# Patient Record
Sex: MALE | Race: WHITE | NOT HISPANIC OR LATINO | Employment: FULL TIME | URBAN - METROPOLITAN AREA
[De-identification: names, ages, dates, MRNs, and addresses within clinical notes are randomized per-mention and may not be internally consistent; named-entity substitution may affect disease eponyms.]

---

## 2019-04-04 LAB
HBA1C MFR BLD HPLC: 5.1 %
HCV AB SER-ACNC: <0.1

## 2019-11-25 DIAGNOSIS — K21.9 GASTROESOPHAGEAL REFLUX DISEASE, ESOPHAGITIS PRESENCE NOT SPECIFIED: Primary | ICD-10-CM

## 2019-11-25 RX ORDER — RABEPRAZOLE SODIUM 20 MG/1
20 TABLET, DELAYED RELEASE ORAL DAILY
Qty: 90 TABLET | Refills: 2 | Status: SHIPPED | OUTPATIENT
Start: 2019-11-25 | End: 2020-09-14 | Stop reason: SDUPTHER

## 2020-09-14 DIAGNOSIS — K21.9 GASTROESOPHAGEAL REFLUX DISEASE, ESOPHAGITIS PRESENCE NOT SPECIFIED: ICD-10-CM

## 2020-09-14 RX ORDER — RABEPRAZOLE SODIUM 20 MG/1
20 TABLET, DELAYED RELEASE ORAL DAILY
Qty: 90 TABLET | Refills: 3 | Status: SHIPPED | OUTPATIENT
Start: 2020-09-14 | End: 2020-11-11 | Stop reason: SDUPTHER

## 2020-09-14 NOTE — TELEPHONE ENCOUNTER
Patient needs a refill of Aciphex to [facility] for 90 day supply  He was last seen 11/19 and is due for egd in December

## 2020-11-11 ENCOUNTER — OFFICE VISIT (OUTPATIENT)
Dept: GASTROENTEROLOGY | Facility: CLINIC | Age: 55
End: 2020-11-11
Payer: COMMERCIAL

## 2020-11-11 VITALS
SYSTOLIC BLOOD PRESSURE: 124 MMHG | DIASTOLIC BLOOD PRESSURE: 70 MMHG | BODY MASS INDEX: 37.92 KG/M2 | HEIGHT: 69 IN | WEIGHT: 256 LBS | HEART RATE: 79 BPM | TEMPERATURE: 97.4 F

## 2020-11-11 DIAGNOSIS — R13.19 ESOPHAGEAL DYSPHAGIA: ICD-10-CM

## 2020-11-11 DIAGNOSIS — K21.9 GASTROESOPHAGEAL REFLUX DISEASE: Primary | ICD-10-CM

## 2020-11-11 DIAGNOSIS — Z86.010 HISTORY OF COLON POLYPS: ICD-10-CM

## 2020-11-11 PROCEDURE — 99214 OFFICE O/P EST MOD 30 MIN: CPT | Performed by: INTERNAL MEDICINE

## 2020-11-11 RX ORDER — RABEPRAZOLE SODIUM 20 MG/1
20 TABLET, DELAYED RELEASE ORAL DAILY
Qty: 90 TABLET | Refills: 3 | Status: SHIPPED | OUTPATIENT
Start: 2020-11-11 | End: 2021-12-08 | Stop reason: SDUPTHER

## 2020-12-08 ENCOUNTER — TELEMEDICINE (OUTPATIENT)
Dept: GASTROENTEROLOGY | Facility: CLINIC | Age: 55
End: 2020-12-08

## 2020-12-08 VITALS — HEIGHT: 69 IN | BODY MASS INDEX: 37.92 KG/M2 | WEIGHT: 256 LBS

## 2020-12-08 DIAGNOSIS — Z86.010 HISTORY OF COLON POLYPS: Primary | ICD-10-CM

## 2020-12-08 RX ORDER — SODIUM PICOSULFATE, MAGNESIUM OXIDE, AND ANHYDROUS CITRIC ACID 10; 3.5; 12 MG/160ML; G/160ML; G/160ML
LIQUID ORAL
Qty: 2 BOTTLE | Refills: 0 | Status: SHIPPED | OUTPATIENT
Start: 2020-12-08 | End: 2021-01-11 | Stop reason: HOSPADM

## 2020-12-15 ENCOUNTER — HOSPITAL ENCOUNTER (OUTPATIENT)
Dept: GASTROENTEROLOGY | Facility: AMBULATORY SURGERY CENTER | Age: 55
Discharge: HOME | End: 2020-12-15

## 2020-12-16 ENCOUNTER — APPOINTMENT (EMERGENCY)
Dept: RADIOLOGY | Facility: HOSPITAL | Age: 55
End: 2020-12-16
Payer: COMMERCIAL

## 2020-12-16 ENCOUNTER — HOSPITAL ENCOUNTER (EMERGENCY)
Facility: HOSPITAL | Age: 55
Discharge: HOME | End: 2020-12-16
Attending: EMERGENCY MEDICINE | Admitting: EMERGENCY MEDICINE
Payer: COMMERCIAL

## 2020-12-16 VITALS
WEIGHT: 250 LBS | DIASTOLIC BLOOD PRESSURE: 72 MMHG | BODY MASS INDEX: 37.03 KG/M2 | HEIGHT: 69 IN | HEART RATE: 72 BPM | SYSTOLIC BLOOD PRESSURE: 123 MMHG | TEMPERATURE: 99.4 F | OXYGEN SATURATION: 94 % | RESPIRATION RATE: 17 BRPM

## 2020-12-16 DIAGNOSIS — U07.1 COVID-19: Primary | ICD-10-CM

## 2020-12-16 LAB
ATRIAL RATE: 74 BPM
P AXIS: 27 DEGREES
PR INTERVAL: 132 MS
QRS AXIS: -54 DEGREES
QRSD INTERVAL: 96 MS
QT INTERVAL: 368 MS
QTC INTERVAL: 408 MS
T WAVE AXIS: 29 DEGREES
VENTRICULAR RATE: 74 BPM

## 2020-12-16 PROCEDURE — 93005 ELECTROCARDIOGRAM TRACING: CPT

## 2020-12-16 PROCEDURE — 93010 ELECTROCARDIOGRAM REPORT: CPT | Performed by: INTERNAL MEDICINE

## 2020-12-16 PROCEDURE — 99285 EMERGENCY DEPT VISIT HI MDM: CPT | Performed by: PHYSICIAN ASSISTANT

## 2020-12-16 PROCEDURE — 99285 EMERGENCY DEPT VISIT HI MDM: CPT

## 2020-12-16 PROCEDURE — 71045 X-RAY EXAM CHEST 1 VIEW: CPT

## 2020-12-16 RX ORDER — MULTIVIT WITH MINERALS/LUTEIN
1000 TABLET ORAL 2 TIMES DAILY
Qty: 20 TABLET | Refills: 0 | Status: SHIPPED | OUTPATIENT
Start: 2020-12-16 | End: 2021-12-14

## 2020-12-16 RX ORDER — MELATONIN
2000 DAILY
Qty: 20 TABLET | Refills: 0 | Status: SHIPPED | OUTPATIENT
Start: 2020-12-16 | End: 2021-12-14

## 2021-01-11 ENCOUNTER — HOSPITAL ENCOUNTER (OUTPATIENT)
Dept: GASTROENTEROLOGY | Facility: AMBULATORY SURGERY CENTER | Age: 56
Discharge: HOME | End: 2021-01-11
Payer: COMMERCIAL

## 2021-01-11 ENCOUNTER — ANESTHESIA EVENT (OUTPATIENT)
Dept: GASTROENTEROLOGY | Facility: AMBULATORY SURGERY CENTER | Age: 56
End: 2021-01-11

## 2021-01-11 ENCOUNTER — ANESTHESIA (OUTPATIENT)
Dept: GASTROENTEROLOGY | Facility: AMBULATORY SURGERY CENTER | Age: 56
End: 2021-01-11

## 2021-01-11 VITALS
SYSTOLIC BLOOD PRESSURE: 146 MMHG | DIASTOLIC BLOOD PRESSURE: 85 MMHG | TEMPERATURE: 97.3 F | HEART RATE: 61 BPM | OXYGEN SATURATION: 98 % | RESPIRATION RATE: 26 BRPM

## 2021-01-11 VITALS — HEART RATE: 59 BPM

## 2021-01-11 DIAGNOSIS — R13.19 ESOPHAGEAL DYSPHAGIA: ICD-10-CM

## 2021-01-11 DIAGNOSIS — Z86.010 HISTORY OF COLON POLYPS: ICD-10-CM

## 2021-01-11 DIAGNOSIS — K21.9 GASTROESOPHAGEAL REFLUX DISEASE: ICD-10-CM

## 2021-01-11 PROCEDURE — 43450 DILATE ESOPHAGUS 1/MULT PASS: CPT | Performed by: INTERNAL MEDICINE

## 2021-01-11 PROCEDURE — 43239 EGD BIOPSY SINGLE/MULTIPLE: CPT | Performed by: INTERNAL MEDICINE

## 2021-01-11 PROCEDURE — 45380 COLONOSCOPY AND BIOPSY: CPT | Performed by: INTERNAL MEDICINE

## 2021-01-11 RX ORDER — SODIUM CHLORIDE 9 MG/ML
50 INJECTION, SOLUTION INTRAVENOUS CONTINUOUS
Status: DISCONTINUED | OUTPATIENT
Start: 2021-01-11 | End: 2021-01-15 | Stop reason: HOSPADM

## 2021-01-11 RX ORDER — MULTIVITAMIN
1 CAPSULE ORAL DAILY
COMMUNITY
End: 2022-07-14

## 2021-01-11 RX ORDER — PROPOFOL 10 MG/ML
INJECTION, EMULSION INTRAVENOUS AS NEEDED
Status: DISCONTINUED | OUTPATIENT
Start: 2021-01-11 | End: 2021-01-11

## 2021-01-11 RX ORDER — ZINC GLUCONATE 50 MG
50 TABLET ORAL DAILY
COMMUNITY
End: 2021-12-14

## 2021-01-11 RX ADMIN — PROPOFOL 30 MG: 10 INJECTION, EMULSION INTRAVENOUS at 13:14

## 2021-01-11 RX ADMIN — PROPOFOL 100 MG: 10 INJECTION, EMULSION INTRAVENOUS at 13:12

## 2021-01-11 RX ADMIN — PROPOFOL 30 MG: 10 INJECTION, EMULSION INTRAVENOUS at 13:21

## 2021-01-11 RX ADMIN — SODIUM CHLORIDE 50 ML/HR: 9 INJECTION, SOLUTION INTRAVENOUS at 12:52

## 2021-01-11 RX ADMIN — PROPOFOL 30 MG: 10 INJECTION, EMULSION INTRAVENOUS at 13:17

## 2021-01-11 RX ADMIN — SODIUM CHLORIDE: 9 INJECTION, SOLUTION INTRAVENOUS at 12:51

## 2021-01-11 RX ADMIN — PROPOFOL 30 MG: 10 INJECTION, EMULSION INTRAVENOUS at 13:34

## 2021-01-11 RX ADMIN — PROPOFOL 30 MG: 10 INJECTION, EMULSION INTRAVENOUS at 13:24

## 2021-01-11 RX ADMIN — PROPOFOL 30 MG: 10 INJECTION, EMULSION INTRAVENOUS at 13:28

## 2021-01-11 RX ADMIN — PROPOFOL 30 MG: 10 INJECTION, EMULSION INTRAVENOUS at 13:31

## 2021-01-11 RX ADMIN — PROPOFOL 40 MG: 10 INJECTION, EMULSION INTRAVENOUS at 13:19

## 2021-01-11 RX ADMIN — PROPOFOL 30 MG: 10 INJECTION, EMULSION INTRAVENOUS at 13:38

## 2021-01-11 NOTE — DISCHARGE INSTRUCTIONS
High Fiber Diet   WHAT YOU NEED TO KNOW:   A high-fiber diet includes foods that have a high amount of fiber  Fiber is the part of fruits, vegetables, and grains that is not broken down by your body  Fiber keeps your bowel movements regular  Fiber can also help lower your cholesterol level, control blood sugar in people with diabetes, and relieve constipation  Fiber can also help you control your weight because it helps you feel full faster  Most adults should eat 25 to 35 grams of fiber each day  Talk to your dietitian or healthcare provider about the amount of fiber you need  DISCHARGE INSTRUCTIONS:   Good sources of fiber:       · Foods with at least 4 grams of fiber per serving:      ? ? to ½ cup of high-fiber cereal (check the nutrition label on the box)    ? ½ cup of blackberries or raspberries    ? 4 dried prunes    ? 1 cooked artichoke    ? ½ cup of cooked legumes, such as lentils, or red, kidney, and alcocer beans    · Foods with 1 to 3 grams of fiber per serving:      ? 1 slice of whole-wheat, pumpernickel, or rye bread    ? ½ cup of cooked brown rice    ? 4 whole-wheat crackers    ? 1 cup of oatmeal    ? ½ cup of cereal with 1 to 3 grams of fiber per serving (check the nutrition label on the box)    ? 1 small piece of fruit, such as an apple, banana, pear, kiwi, or orange    ? 3 dates    ? ½ cup of canned apricots, fruit cocktail, peaches, or pears    ? ½ cup of raw or cooked vegetables, such as carrots, cauliflower, cabbage, spinach, squash, or corn  Ways that you can increase fiber in your diet:   · Choose brown or wild rice instead of white rice  · Use whole wheat flour in recipes instead of white or all-purpose flour  · Add beans and peas to casseroles or soups  · Choose fresh fruit and vegetables with peels or skins on instead of juices  Other diet guidelines to follow:   · Add fiber to your diet slowly    You may have abdominal discomfort, bloating, and gas if you add fiber to your diet too quickly  · Drink plenty of liquids as you add fiber to your diet  You may have nausea or develop constipation if you do not drink enough water  Ask how much liquid to drink each day and which liquids are best for you  © Copyright [address] Information is for End User's use only and may not be sold, redistributed or otherwise used for commercial purposes  All illustrations and images included in CareNotes® are the copyrighted property of A D A M , Inc  or [facility] Marj Hanson   The above information is an  only  It is not intended as medical advice for individual conditions or treatments  Talk to your doctor, nurse or pharmacist before following any medical regimen to see if it is safe and effective for you  Hemorrhoids   WHAT YOU NEED TO KNOW:   What are hemorrhoids? Hemorrhoids are swollen blood vessels inside your rectum (internal hemorrhoids) or on your anus (external hemorrhoids)  Sometimes a hemorrhoid may prolapse  This means it extends out of your anus  What increases my risk for hemorrhoids? · Pregnancy or obesity    · Straining or sitting for a long time during bowel movements    · Liver disease    · Weak muscles around the anus caused by older age, rectal surgery, or anal intercourse    · A lack of physical activity    · Chronic diarrhea or constipation    · A low-fiber diet    What are the signs and symptoms of hemorrhoids? · Pain or itching around your anus or inside your rectum    · Swelling or bumps around your anus    · Bright red blood in your bowel movement, on the toilet paper, or in the toilet bowl    · Tissue bulging out of your anus (prolapsed hemorrhoids)    · Incontinence (poor control over urine or bowel movements)    How are hemorrhoids diagnosed? Your healthcare provider will ask about your symptoms, the foods you eat, and your bowel movements  He or she will examine your anus for external hemorrhoids   You may need the following:  · A digital rectal exam  is a test to check for hemorrhoids  Your healthcare provider will put a gloved finger inside your anus to feel for the hemorrhoids  · An anoscopy  is a test that uses a scope (small tube with a light and camera on the end) to look at your hemorrhoids  How are hemorrhoids treated? Treatment will depend on your symptoms  You may need any of the following:  · Medicines  can help decrease pain and swelling, and soften your bowel movement  The medicine may be a pill, pad, cream, or ointment  · Procedures  may be used to shrink or remove your hemorrhoid  Examples include rubber-band ligation, sclerotherapy, and photocoagulation  These procedures may be done in your healthcare provider's office  Ask your healthcare provider for more information about these procedures  · Surgery  may be needed to shrink or remove your hemorrhoids  How can I manage my symptoms? · Apply ice on your anus for 15 to 20 minutes every hour or as directed  Use an ice pack, or put crushed ice in a plastic bag  Cover it with a towel before you apply it to your anus  Ice helps prevent tissue damage and decreases swelling and pain  · Take a sitz bath  Fill a bathtub with 4 to 6 inches of warm water  You may also use a sitz bath pan that fits inside a toilet bowl  Sit in the sitz bath for 15 minutes  Do this 3 times a day, and after each bowel movement  The warm water can help decrease pain and swelling  · Keep your anal area clean  Gently wash the area with warm water daily  Soap may irritate the area  After a bowel movement, wipe with moist towelettes or wet toilet paper  Dry toilet paper can irritate the area  How can I help prevent hemorrhoids? · Do not strain to have a bowel movement  Do not sit on the toilet too long  These actions can increase pressure on the tissues in your rectum and anus  · Drink plenty of liquids  Liquids can help prevent constipation   Ask how much liquid to drink each day and which liquids are best for you  · Eat a variety of high-fiber foods  Examples include fruits, vegetables, and whole grains  Ask your healthcare provider how much fiber you need each day  You may need to take a fiber supplement  · Exercise as directed  Exercise, such as walking, may make it easier to have a bowel movement  Ask your healthcare provider to help you create an exercise plan  · Do not have anal sex  Anal sex can weaken the skin around your rectum and anus  · Avoid heavy lifting  This can cause straining and increase your risk for another hemorrhoid  When should I seek immediate care? · You have severe pain in your rectum or around your anus  · You have severe pain in your abdomen and you are vomiting  · You have bleeding from your anus that soaks through your underwear  When should I contact my healthcare provider? · You have frequent and painful bowel movements  · Your hemorrhoid looks or feels more swollen than usual      · You do not have a bowel movement for 2 days or more  · You see or feel tissue coming through your anus  · You have questions or concerns about your condition or care  CARE AGREEMENT:   You have the right to help plan your care  Learn about your health condition and how it may be treated  Discuss treatment options with your healthcare providers to decide what care you want to receive  You always have the right to refuse treatment  The above information is an  only  It is not intended as medical advice for individual conditions or treatments  Talk to your doctor, nurse or pharmacist before following any medical regimen to see if it is safe and effective for you  © Copyright [address] Information is for End User's use only and may not be sold, redistributed or otherwise used for commercial purposes   All illustrations and images included in CareNotes® are the copyrighted property of [email or web address]  or [facility]  Diverticulosis   WHAT YOU NEED TO KNOW:   What is diverticulosis? Diverticulosis is a condition that causes small pockets called diverticula to form in your intestine  These pockets make it difficult for bowel movements to pass through your digestive system  What causes diverticulosis? Diverticula form when muscles have to work hard to move bowel movements through the intestine  The force causes bulges to form at weak areas in the intestine  This may happen if you eat foods that are low in fiber  Fiber helps give your bowel movements more bulk so they are larger and easier to move through your colon  The following may increase your risk of diverticulosis:  · A history of constipation    · Age 36 or older    · Obesity    · Lack of exercise    What are the signs and symptoms of diverticulosis? Diverticulosis usually does not cause any signs or symptoms  It may cause any of the following in some people:  · Pain or discomfort in your lower abdomen    · Abdominal bloating    · Constipation or diarrhea    How is diverticulosis diagnosed? Your healthcare provider will examine you and ask about your bowel movements, diet, and symptoms  He or she will also ask about any medical conditions you have or medicines you take  You may need any of the following:  · Blood tests  may be done to check for signs of inflammation  · A barium enema  is an x-ray of your colon that may show diverticula  A tube is put into your anus, and a liquid called barium is put through the tube  Barium is used so that healthcare providers can see your colon more clearly  · Flexible sigmoidoscopy  is a test to look for any changes in your lower intestines and rectum  It may also show the cause of any bleeding or pain  A soft, bendable tube with a light on the end will be put into your anus  It will then be moved forward into your intestine  · A colonoscopy  is used to look at your whole colon   A scope (long bendable tube with a light on the end) is used to take pictures  This test may show diverticula  · A CT scan , or CAT scan, may show diverticula  You may be given contrast liquid before the scan  Tell the healthcare provider if you have ever had an allergic reaction to contrast liquid  How is diverticulosis managed? The goal of treatment is to manage any symptoms you have and prevent other problems such as diverticulitis  Diverticulitis is swelling or infection of the diverticula  Your healthcare provider may recommend any of the following:  · Eat a variety of high-fiber foods  High-fiber foods help you have regular bowel movements  High-fiber foods include cooked beans, fruits, vegetables, and some cereals  Most adults need 25 to 35 grams of fiber each day  Your healthcare provider may recommend that you have more  Ask your healthcare provider how much fiber you need  Increase fiber slowly  You may have abdominal discomfort, bloating, and gas if you add fiber to your diet too quickly  You may need to take a fiber supplement if you are not getting enough fiber from food  · Medicines  to soften your bowel movements may be given  You may also need medicines to treat symptoms such as bloating and pain  · Drink liquids as directed  You may need to drink 2 to 3 liters (8 to 12 cups) of liquids every day  Ask your healthcare provider how much liquid to drink each day and which liquids are best for you  · Apply heat  on your abdomen for 20 to 30 minutes every 2 hours for as many days as directed  Heat helps decrease pain and muscle spasms  How can I help prevent diverticulitis or other symptoms? The following may help decrease your risk for diverticulitis or symptoms, such as bleeding  Talk to your provider about these or other things you can do to prevent problems that may occur with diverticulosis  · Exercise regularly  Ask your healthcare provider about the best exercise plan for you   Exercise can help you have regular bowel movements  Get 30 minutes of exercise on most days of the week  · Maintain a healthy weight  Ask your healthcare provider how much you should weigh  Ask him or her to help you create a weight loss plan if you are overweight  · Do not smoke  Nicotine and other chemicals in cigarettes increase your risk for diverticulitis  Ask your healthcare provider for information if you currently smoke and need help to quit  E-cigarettes or smokeless tobacco still contain nicotine  Talk to your healthcare provider before you use these products  · Ask your healthcare provider if it is safe to take NSAIDs  NSAIDs may increase your risk of diverticulitis  When should I seek immediate care? · You have severe pain on the left side of your lower abdomen  · Your bowel movements are bright or dark red  When should I contact my healthcare provider? · You have a fever and chills  · You feel dizzy or lightheaded  · You have nausea, or you are vomiting  · You have a change in your bowel movements  · You have questions or concerns about your condition or care  CARE AGREEMENT:   You have the right to help plan your care  Learn about your health condition and how it may be treated  Discuss treatment options with your healthcare providers to decide what care you want to receive  You always have the right to refuse treatment  The above information is an  only  It is not intended as medical advice for individual conditions or treatments  Talk to your doctor, nurse or pharmacist before following any medical regimen to see if it is safe and effective for you  © Copyright [address] Information is for End User's use only and may not be sold, redistributed or otherwise used for commercial purposes   All illustrations and images included in CareNotes® are the copyrighted property of [employer] D A M , Inc  or [facility] Marj Hanson   Colorectal Polyps   WHAT YOU NEED TO KNOW: What are colorectal polyps? Colorectal polyps are small growths of tissue in the lining of the colon and rectum  Most polyps are hyperplastic polyps and are usually benign (noncancerous)  Certain types of polyps, called adenomatous polyps, may turn into cancer  What increases my risk of colorectal polyps? The exact cause of colorectal polyps is unknown  The following may increase your risk:  · Older age    · A diet of foods high in fat and low in fiber     · Family history of polyps    · Intestinal diseases, such as Crohn's disease or ulcerative colitis    · An unhealthy lifestyle, such as physical inactivity, smoking, or drinking alcohol    · Obesity    What are the signs and symptoms of colorectal polyps? · Blood in your bowel movement or bleeding from the rectum    · Change in bowel movement habits, such as diarrhea and constipation    · Abdominal pain    How are colorectal polyps diagnosed? You should have fecal blood screening once a year for colorectal disease if you are over 48years old  You should be screened earlier if you have an intestinal disease or a family history of polyps or colorectal cancer  During this screening, a sample of your bowel movement is checked for blood, which may be an early sign of colorectal polyps or cancer  You may also need any of the following tests:  · Digital rectal exam:  Your healthcare provider will examine your anus and use a finger to check your rectum for polyps  · Barium enema: A barium enema is an x-ray of the colon  A tube is put into your anus, and a liquid called barium is put through the tube  Barium is used so that healthcare providers can see your colon better on the x-ray film  · Virtual colonoscopy: This is a CT scan that takes pictures of the inside of your colon and rectum  A small, flexible tube is put into your rectum and air or carbon dioxide (gas) is used to expand your colon   This lets healthcare providers clearly see your colon and any polyps on a monitor  · Colonoscopy or sigmoidoscopy: These procedures help your healthcare provider see the inside of your colon using a flexible tube with a small light and camera on the end  During a sigmoidoscopy, your healthcare provider will only look at rectum and lower colon  During a colonoscopy, healthcare providers will look at the full length of your colon  Healthcare providers may remove a small amount of tissue from the colon for a biopsy  How are colorectal polyps treated? A polypectomy is a minimally invasive procedure to remove your polyps  They may be removed during a colonoscopy or sigmoidoscopy  Your healthcare provider may need to remove the polyps with a laparoscope  Laparoscopy is done by inserting a small, flexible scope into incisions made on your abdomen  What are the risks of colorectal polyps? You may bleed during a colonoscopy procedure  Your bowel may be perforated (torn) when polyps are removed  This may lead to an open abdominal surgery  During surgery, you may bleed too much or get an infection  Adenomatous polyps that are not removed may turn into cancer and become more difficult to treat  Where can I find support and more information? · [facility] ([facility]) [address], [place] [address]  Phone: [phone]  Web Address: Shamika Del Rosario  [facility] gov    When should I contact my healthcare provider? · You have a fever  · You have chills, a cough, or feel weak and achy  · You have abdominal pain that does not go away or gets worse after you take medicine  · Your abdomen is swollen  · You are losing weight without trying  · You have questions or concerns about your condition or care  When should I seek immediate care or call 911? · You have sudden shortness of breath  · You have a fast heart rate, fast breathing, or are too dizzy to stand up  · You have severe abdominal pain  · You see blood in your bowel movement  CARE AGREEMENT:   You have the right to help plan your care  Learn about your health condition and how it may be treated  Discuss treatment options with your healthcare providers to decide what care you want to receive  You always have the right to refuse treatment  The above information is an  only  It is not intended as medical advice for individual conditions or treatments  Talk to your doctor, nurse or pharmacist before following any medical regimen to see if it is safe and effective for you  © Copyright [address] Information is for End User's use only and may not be sold, redistributed or otherwise used for commercial purposes  All illustrations and images included in CareNotes® are the copyrighted property of A D A M , Inc  or [employer]  Diet for Stomach Ulcers and Gastritis   WHAT YOU NEED TO KNOW:   What is a diet for stomach ulcers and gastritis? A diet for ulcers and gastritis is a meal plan that limits foods that irritate your stomach  Certain foods may worsen symptoms such as stomach pain, bloating, heartburn, or indigestion  Which foods should I limit or avoid? You may need to avoid acidic, spicy, or high-fat foods  Not all foods affect everyone the same way  You will need to learn which foods worsen your symptoms and limit those foods  The following are some foods that may worsen ulcer or gastritis symptoms:  · Beverages:      ? Whole milk and chocolate milk    ? Hot cocoa and cola    ? Any beverage with caffeine    ? Regular and decaffeinated coffee    ? Peppermint and spearmint tea    ? Green and black tea, with or without caffeine    ? Orange and grapefruit juices    ? Drinks that contain alcohol    · Spices and seasonings:      ? Black and red pepper    ? Chili powder    ? Mustard seed and nutmeg    · Other foods:      ? Dairy foods made from whole milk or cream    ? Chocolate    ?  Spicy or strongly flavored cheeses, such as jalapeno or black pepper    ? Highly seasoned, high-fat meats, such as sausage, salami, leo, ham, and cold cuts    ? Hot chiles and peppers    ? Tomato products, such as tomato paste, tomato sauce, or tomato juice    Which foods can I eat and drink? Eat a variety of healthy foods from all the food groups  Eat fruits, vegetables, whole grains, and fat-free or low-fat dairy foods  Whole grains include whole-wheat breads, cereals, pasta, and brown rice  Choose lean meats, poultry (chicken and turkey), fish, beans, eggs, and nuts  A healthy meal plan is low in unhealthy fats, salt, and added sugar  Healthy fats include olive oil and canola oil  Ask your dietitian for more information about a healthy meal plan  What other guidelines may be helpful? · Do not eat right before bedtime  Stop eating at least 2 hours before bedtime  · Eat small, frequent meals  Your stomach may tolerate small, frequent meals better than large meals  CARE AGREEMENT:   You have the right to help plan your care  Discuss treatment options with your healthcare provider to decide what care you want to receive  You always have the right to refuse treatment  The above information is an  only  It is not intended as medical advice for individual conditions or treatments  Talk to your doctor, nurse or pharmacist before following any medical regimen to see if it is safe and effective for you  © Copyright [address] Information is for End User's use only and may not be sold, redistributed or otherwise used for commercial purposes  All illustrations and images included in CareNotes® are the copyrighted property of [employer] M , Inc  or [facility] Marj Hanson   Esophageal Dilation   WHAT YOU NEED TO KNOW:   Esophageal dilation is a procedure to widen a narrow part of your esophagus  Your healthcare provider will use a dilator (inflatable balloon or another tool that expands) to make the area wider   He may also do an endoscopy before or during your esophageal dilation  During an endoscopy, your healthcare provider will use a scope to see inside your esophagus  DISCHARGE INSTRUCTIONS:   Medicines:   · Medicines  may be given to decrease stomach acid that can irritate your esophagus  · Take your medicine as directed  Contact your healthcare provider if you think your medicine is not helping or if you have side effects  Tell him if you are allergic to any medicine  Keep a list of the medicines, vitamins, and herbs you take  Include the amounts, and when and why you take them  Bring the list or the pill bottles to follow-up visits  Carry your medicine list with you in case of an emergency  Follow up with your healthcare provider as directed:  Write down your questions so you remember to ask them during your visits  Nutrition:  You may eat foods you normally eat  Chew your food well  Eat soft foods if you still have problems swallowing  Soft foods include applesauce, bananas, cooked cereal, cottage cheese, eggs, pudding, and yogurt  Ask for more information on what types of food to eat  Contact your healthcare provider if:   · You have a fever  · You feel very full or bloated  · You have more problems swallowing food  · You have nausea or are vomiting  · You have questions or concerns about your condition or care  Seek care immediately or call 911 if:   · You vomit blood  · You are not able to swallow any food  · You have a fast heartbeat, chest pain, or sudden trouble breathing  · Your abdomen suddenly becomes tender and hard  © Copyright [employer] 2018 Information is for End User's use only and may not be sold, redistributed or otherwise used for commercial purposes  All illustrations and images included in CareNotes® are the copyrighted property of A D A M , Inc  or [facility] Marj Hanson   The above information is an  only   It is not intended as medical advice for individual conditions or treatments  Talk to your doctor, nurse or pharmacist before following any medical regimen to see if it is safe and effective for you  Gastritis   WHAT YOU NEED TO KNOW:   What is gastritis? Gastritis is inflammation or irritation of the lining of your stomach  What increases my risk for gastritis? · Infection with bacteria, a virus, or a parasite    · NSAIDs, aspirin, or steroid medicine    · Use of tobacco products or alcohol    · Trauma such as an injury to your stomach or intestine    · Autoimmune disorders such as diabetes, thyroid disease, or Crohn disease    · Stress    · Age older than 60 years    · Illegal drugs, such as cocaine    What are the signs and symptoms of gastritis? · Stomach pain, burning, or tenderness when you press on it    · Stomach fullness or tightness    · Nausea or vomiting    · Loss of appetite, or feeling full quickly when you eat    · Bad breath    · Fatigue or feeling more tired than usual    · Heartburn    How is gastritis diagnosed? Your healthcare provider will ask about your signs and symptoms and examine you  You may need any of the following:  · Blood tests  may be used to show an infection, dehydration, or anemia (low red blood cell levels)  · A bowel movement sample  may be tested for blood or the germ that may be causing your gastritis  · A breath test  may show if H pylori is causing your gastritis  You will be given a liquid to drink  Then you will breathe into a bag  Your healthcare provider will measure the amount of carbon dioxide in your breath  Extra amounts of carbon dioxide may mean you have an H pylori infection  · An endoscopy  may be used to look for irritation or bleeding in your stomach  Your healthcare provider will use an endoscope (tube with a light and camera on the end) during the procedure  He or she may take a sample from your stomach to be tested  How is gastritis treated?   Your symptoms may go away without treatment  Treatment will depend on what is causing your gastritis  Your healthcare provider may recommend changes to the medicines you take  Medicines may be given to help treat a bacterial infection or decrease stomach acid  How can I manage or prevent gastritis? · Do not smoke  Nicotine and other chemicals in cigarettes and cigars can make your symptoms worse and cause lung damage  Ask your healthcare provider for information if you currently smoke and need help to quit  E-cigarettes or smokeless tobacco still contain nicotine  Talk to your healthcare provider before you use these products  · Do not drink alcohol  Alcohol can prevent healing and make your gastritis worse  Talk to your healthcare provider if you need help to stop drinking  · Do not take NSAIDs or aspirin unless directed  These and similar medicines can cause irritation of your stomach lining  If your healthcare provider says it is okay to take NSAIDs, take them with food  · Do not eat foods that cause irritation  Foods such as oranges and salsa can cause burning or pain  Eat a variety of healthy foods  Examples include fruits (not citrus), vegetables, low-fat dairy products, beans, whole-grain breads, and lean meats and fish  Try to eat small meals, and drink water with your meals  Do not eat for at least 3 hours before you go to bed  · Find ways to relax and decrease stress  Stress can increase stomach acid and make gastritis worse  Activities such as yoga, meditation, or listening to music can help you relax  Spend time with friends, or do things you enjoy  Call 911 for any of the following:   · You develop chest pain or shortness of breath  When should I seek immediate care? · You vomit blood  · You have black or bloody bowel movements  · You have severe stomach or back pain  When should I contact my healthcare provider? · You have a fever      · You have new or worsening symptoms, even after treatment  · You have questions or concerns about your condition or care  CARE AGREEMENT:   You have the right to help plan your care  Learn about your health condition and how it may be treated  Discuss treatment options with your healthcare providers to decide what care you want to receive  You always have the right to refuse treatment  The above information is an  only  It is not intended as medical advice for individual conditions or treatments  Talk to your doctor, nurse or pharmacist before following any medical regimen to see if it is safe and effective for you  © Copyright [address] Information is for End User's use only and may not be sold, redistributed or otherwise used for commercial purposes  All illustrations and images included in CareNotes® are the copyrighted property of A D A M , Inc  or [employer] [facility]  Gastric Polyps   WHAT YOU NEED TO KNOW:   What are gastric polyps? Gastric polyps are growths that form in the lining of your stomach  They are not cancerous, but certain types of polyps can change into cancer  What puts me at risk for gastric polyps? · Chronic gastritis caused by NSAIDs use or ulcers    · Long-term use of proton pump inhibitor medicines (used to decrease stomach acid)    · An infection in your stomach caused by H  pylori bacteria    What are the symptoms of gastric polyps? You may have no symptoms  Large polyps may cause any of the following:  · Abdominal pain    · Indigestion    · Vomiting after meals or vomiting blood    · Dark or bloody bowel movements    How are gastric polyps diagnosed? Gastric polyps are usually found during an endoscopy for another reason  All or part of the polyp will be removed during the test  Your healthcare provider may also remove tissue from your stomach  The polyps and tissue are sent to the lab for testing  How are gastric polyps treated? Some types of polyps go away on their own   Other types may be removed if they are large, you have symptoms, or abnormal cells are found  Large polyps and abnormal cells increase your risk for cancer  You may also need antibiotics if you have an infection caused by H  pylori bacteria  Part of your stomach may be removed if the polyps cannot be removed and abnormal cells are found  When should I seek immediate care? · You have blood in your vomit  · You have dark or bloody bowel movements  · You have severe pain in your abdomen that does not go away after you take medicine  When should I contact my healthcare provider? · You have indigestion that does not go away with treatment  · You vomit after meals  · You have questions or concerns about your condition or care  CARE AGREEMENT:   You have the right to help plan your care  Learn about your health condition and how it may be treated  Discuss treatment options with your healthcare providers to decide what care you want to receive  You always have the right to refuse treatment  The above information is an  only  It is not intended as medical advice for individual conditions or treatments  Talk to your doctor, nurse or pharmacist before following any medical regimen to see if it is safe and effective for you  © Copyright [address] Information is for End User's use only and may not be sold, redistributed or otherwise used for commercial purposes   All illustrations and images included in CareNotes® are the copyrighted property of A D A [employer]  or [address] [place] [address]

## 2021-01-11 NOTE — ANESTHESIA PREPROCEDURE EVALUATION
Procedure:  COLONOSCOPY  EGD    Relevant Problems   No relevant active problems        Physical Exam    Airway    Mallampati score: II  TM Distance: >3 FB  Neck ROM: full     Dental   No notable dental hx     Cardiovascular  Rhythm: regular, Rate: normal, Cardiovascular exam normal    Pulmonary  Pulmonary exam normal Breath sounds clear to auscultation,     Other Findings        Anesthesia Plan  ASA Score- 2     Anesthesia Type- IV sedation with anesthesia with ASA Monitors  Additional Monitors:   Airway Plan:           Plan Factors-    Chart reviewed  Patient summary reviewed  Induction- intravenous  Postoperative Plan-     Informed Consent- Anesthetic plan and risks discussed with patient

## 2021-01-11 NOTE — H&P
History and Physical - [facility]  Carito Borges 54 y o  male MRN: [id number]    HPI: Carito Borges is a 54y o  year old male who presents for EGD and colonoscopy to evaluate for GERD, dysphagia, and personal history of polyps  REVIEW OF SYSTEMS: Per the HPI, and otherwise unremarkable  Historical Information      Past Medical History:   Diagnosis Date    Cancer [facility])     testicular & melanoma    Colon polyp     COVID-19 12/2020    GERD (gastroesophageal reflux disease)     Melanoma ([address] )     Testicular cancer [facility])      Past Surgical History:   Procedure Laterality Date    COLONOSCOPY      December 2015:  Adenomatous polyp in the cecum, sigmoid diverticulitis, internal hemorrhoids    ORCHIECTOMY      SKIN CANCER EXCISION      UPPER GASTROINTESTINAL ENDOSCOPY      September 2014 [facility] ring biopsies negative for Johnson's EOE or H pylori       Social History   Social History     Substance and Sexual Activity   Alcohol Use Not Currently     Social History     Substance and Sexual Activity   Drug Use Never     Social History     Tobacco Use   Smoking Status Never Smoker   Smokeless Tobacco Never Used     Family History   Problem Relation Age of Onset    Heart disease Father     No Known Problems Sister     No Known Problems Brother     No Known Problems Brother     Colon polyps Neg Hx     Colon cancer Neg Hx        Meds/Allergies       Current Outpatient Medications:     Ascorbic Acid (vitamin C) 1000 MG tablet    Multiple Vitamin (multivitamin) capsule    RABEprazole (ACIPHEX) 20 MG tablet    zinc gluconate 50 mg tablet    cholecalciferol (VITAMIN D3) 1,000 units tablet    Sod Picosulfate-Mag Ox-Cit Acd (Clenpiq) 10-3 5-12 MG-GM -GM/160ML SOLN    Current Facility-Administered Medications:     sodium chloride 0 9 % infusion, 50 mL/hr, Intravenous, Continuous, 50 mL/hr at 01/11/21 1252    Facility-Administered Medications Ordered in Other Encounters:     propofol (DIPRIVAN) 200 MG/20ML bolus injection, , Intravenous, PRN, 30 mg at 01/11/21 1338    No Known Allergies    Objective     /70   Pulse 67   Temp (!) 97 3 °F (36 3 °C) (Temporal)   Resp 13   SpO2 99%     PHYSICAL EXAM    Gen: NAD AAOx3  CV: S1S2 RRR no m/r/g  CHEST: Clear b/l no c/r/w  ABD: soft, +BS NT/ND  EXT: no edema    ASSESSMENT/PLAN:  This is a 54y o  year old male here for EGD and colonoscopy, and he is stable and optimized for his procedure

## 2021-03-18 ENCOUNTER — IMMUNIZATIONS (OUTPATIENT)
Dept: FAMILY MEDICINE CLINIC | Facility: HOSPITAL | Age: 56
End: 2021-03-18

## 2021-03-18 DIAGNOSIS — Z23 ENCOUNTER FOR IMMUNIZATION: Primary | ICD-10-CM

## 2021-03-18 PROCEDURE — 91300 SARS-COV-2 / COVID-19 MRNA VACCINE (PFIZER-BIONTECH) 30 MCG: CPT

## 2021-03-18 PROCEDURE — 0001A SARS-COV-2 / COVID-19 MRNA VACCINE (PFIZER-BIONTECH) 30 MCG: CPT

## 2021-04-12 ENCOUNTER — IMMUNIZATIONS (OUTPATIENT)
Dept: FAMILY MEDICINE CLINIC | Facility: HOSPITAL | Age: 56
End: 2021-04-12

## 2021-04-12 DIAGNOSIS — Z23 ENCOUNTER FOR IMMUNIZATION: Primary | ICD-10-CM

## 2021-04-12 PROCEDURE — 0002A SARS-COV-2 / COVID-19 MRNA VACCINE (PFIZER-BIONTECH) 30 MCG: CPT

## 2021-04-12 PROCEDURE — 91300 SARS-COV-2 / COVID-19 MRNA VACCINE (PFIZER-BIONTECH) 30 MCG: CPT

## 2021-12-08 DIAGNOSIS — K21.9 GASTROESOPHAGEAL REFLUX DISEASE: ICD-10-CM

## 2021-12-08 RX ORDER — RABEPRAZOLE SODIUM 20 MG/1
20 TABLET, DELAYED RELEASE ORAL DAILY
Qty: 90 TABLET | Refills: 0 | Status: SHIPPED | OUTPATIENT
Start: 2021-12-08 | End: 2022-01-07 | Stop reason: SDUPTHER

## 2021-12-13 PROBLEM — C43.9 MELANOMA (HCC): Status: ACTIVE | Noted: 2021-12-13

## 2021-12-13 PROBLEM — C62.90 TESTICLE CANCER (HCC): Status: ACTIVE | Noted: 2021-12-13

## 2021-12-14 ENCOUNTER — OFFICE VISIT (OUTPATIENT)
Dept: FAMILY MEDICINE CLINIC | Facility: CLINIC | Age: 56
End: 2021-12-14
Payer: COMMERCIAL

## 2021-12-14 VITALS
SYSTOLIC BLOOD PRESSURE: 132 MMHG | WEIGHT: 268 LBS | TEMPERATURE: 97.6 F | OXYGEN SATURATION: 98 % | HEART RATE: 70 BPM | BODY MASS INDEX: 40.62 KG/M2 | HEIGHT: 68 IN | DIASTOLIC BLOOD PRESSURE: 80 MMHG

## 2021-12-14 DIAGNOSIS — Z00.00 ANNUAL PHYSICAL EXAM: Primary | ICD-10-CM

## 2021-12-14 DIAGNOSIS — Z13.29 SCREENING FOR THYROID DISORDER: ICD-10-CM

## 2021-12-14 DIAGNOSIS — Z12.5 SCREENING FOR PROSTATE CANCER: ICD-10-CM

## 2021-12-14 DIAGNOSIS — Z85.820 HISTORY OF MELANOMA: ICD-10-CM

## 2021-12-14 DIAGNOSIS — R03.0 ELEVATED BLOOD PRESSURE READING: ICD-10-CM

## 2021-12-14 DIAGNOSIS — Z13.1 SCREENING FOR DIABETES MELLITUS: ICD-10-CM

## 2021-12-14 DIAGNOSIS — H91.91 HEARING LOSS OF RIGHT EAR, UNSPECIFIED HEARING LOSS TYPE: ICD-10-CM

## 2021-12-14 DIAGNOSIS — E66.01 MORBID OBESITY WITH BMI OF 40.0-44.9, ADULT (HCC): ICD-10-CM

## 2021-12-14 DIAGNOSIS — Z13.6 SCREENING FOR CARDIOVASCULAR CONDITION: ICD-10-CM

## 2021-12-14 DIAGNOSIS — R20.2 PARESTHESIA OF RIGHT UPPER EXTREMITY: ICD-10-CM

## 2021-12-14 DIAGNOSIS — K21.9 GASTROESOPHAGEAL REFLUX DISEASE, UNSPECIFIED WHETHER ESOPHAGITIS PRESENT: ICD-10-CM

## 2021-12-14 PROBLEM — R35.0 FREQUENCY OF MICTURITION: Status: ACTIVE | Noted: 2021-05-12

## 2021-12-14 PROBLEM — N39.43 POST-VOID DRIBBLING: Status: ACTIVE | Noted: 2021-05-12

## 2021-12-14 PROBLEM — H93.231: Status: ACTIVE | Noted: 2021-12-14

## 2021-12-14 PROBLEM — Z85.47 HISTORY OF TESTICULAR CANCER: Status: ACTIVE | Noted: 2021-12-13

## 2021-12-14 PROCEDURE — 99213 OFFICE O/P EST LOW 20 MIN: CPT | Performed by: FAMILY MEDICINE

## 2021-12-14 PROCEDURE — 99386 PREV VISIT NEW AGE 40-64: CPT | Performed by: FAMILY MEDICINE

## 2021-12-17 ENCOUNTER — LAB (OUTPATIENT)
Dept: LAB | Facility: HOSPITAL | Age: 56
End: 2021-12-17
Payer: COMMERCIAL

## 2021-12-17 DIAGNOSIS — Z13.1 SCREENING FOR DIABETES MELLITUS: ICD-10-CM

## 2021-12-17 DIAGNOSIS — Z13.29 SCREENING FOR THYROID DISORDER: ICD-10-CM

## 2021-12-17 DIAGNOSIS — Z13.6 SCREENING FOR ISCHEMIC HEART DISEASE: ICD-10-CM

## 2021-12-17 DIAGNOSIS — Z12.5 SCREENING FOR PROSTATE CANCER: ICD-10-CM

## 2021-12-17 LAB
ALBUMIN SERPL BCP-MCNC: 3.5 G/DL (ref 3.5–5)
ALP SERPL-CCNC: 56 U/L (ref 46–116)
ALT SERPL W P-5'-P-CCNC: 30 U/L (ref 12–78)
ANION GAP SERPL CALCULATED.3IONS-SCNC: 4 MMOL/L (ref 4–13)
AST SERPL W P-5'-P-CCNC: 19 U/L (ref 5–45)
BILIRUB SERPL-MCNC: 0.75 MG/DL (ref 0.2–1)
BUN SERPL-MCNC: 14 MG/DL (ref 5–25)
CALCIUM SERPL-MCNC: 9 MG/DL (ref 8.3–10.1)
CHLORIDE SERPL-SCNC: 108 MMOL/L (ref 100–108)
CHOLEST SERPL-MCNC: 195 MG/DL
CO2 SERPL-SCNC: 29 MMOL/L (ref 21–32)
CREAT SERPL-MCNC: 0.96 MG/DL (ref 0.6–1.3)
GFR SERPL CREATININE-BSD FRML MDRD: 88 ML/MIN/1.73SQ M
GLUCOSE P FAST SERPL-MCNC: 97 MG/DL (ref 65–99)
HDLC SERPL-MCNC: 51 MG/DL
LDLC SERPL CALC-MCNC: 121 MG/DL (ref 0–100)
NONHDLC SERPL-MCNC: 144 MG/DL
POTASSIUM SERPL-SCNC: 4.3 MMOL/L (ref 3.5–5.3)
PROT SERPL-MCNC: 7.1 G/DL (ref 6.4–8.2)
PSA SERPL-MCNC: 0.4 NG/ML (ref 0–4)
SODIUM SERPL-SCNC: 141 MMOL/L (ref 136–145)
TRIGL SERPL-MCNC: 113 MG/DL
TSH SERPL DL<=0.05 MIU/L-ACNC: 2.31 UIU/ML (ref 0.36–3.74)

## 2021-12-17 PROCEDURE — 80053 COMPREHEN METABOLIC PANEL: CPT

## 2021-12-17 PROCEDURE — 36415 COLL VENOUS BLD VENIPUNCTURE: CPT

## 2021-12-17 PROCEDURE — 80061 LIPID PANEL: CPT

## 2021-12-17 PROCEDURE — 84443 ASSAY THYROID STIM HORMONE: CPT

## 2021-12-17 PROCEDURE — G0103 PSA SCREENING: HCPCS

## 2022-01-06 ENCOUNTER — TELEPHONE (OUTPATIENT)
Dept: FAMILY MEDICINE CLINIC | Facility: CLINIC | Age: 57
End: 2022-01-06

## 2022-01-06 NOTE — TELEPHONE ENCOUNTER
Can call patient to let him know that we received our adacel vaccine shipment  He can schedule this as a nurse visit at his convenience

## 2022-01-07 ENCOUNTER — OFFICE VISIT (OUTPATIENT)
Dept: GASTROENTEROLOGY | Facility: CLINIC | Age: 57
End: 2022-01-07
Payer: COMMERCIAL

## 2022-01-07 VITALS
HEIGHT: 69 IN | WEIGHT: 265.6 LBS | BODY MASS INDEX: 39.34 KG/M2 | DIASTOLIC BLOOD PRESSURE: 86 MMHG | SYSTOLIC BLOOD PRESSURE: 142 MMHG

## 2022-01-07 DIAGNOSIS — R13.10 DYSPHAGIA, UNSPECIFIED TYPE: ICD-10-CM

## 2022-01-07 DIAGNOSIS — K64.0 GRADE I HEMORRHOIDS: ICD-10-CM

## 2022-01-07 DIAGNOSIS — Z86.010 HISTORY OF COLON POLYPS: ICD-10-CM

## 2022-01-07 DIAGNOSIS — K21.9 GASTROESOPHAGEAL REFLUX DISEASE, UNSPECIFIED WHETHER ESOPHAGITIS PRESENT: Primary | ICD-10-CM

## 2022-01-07 PROCEDURE — 99213 OFFICE O/P EST LOW 20 MIN: CPT | Performed by: INTERNAL MEDICINE

## 2022-01-07 RX ORDER — HYDROCORTISONE 25 MG/G
CREAM TOPICAL 2 TIMES DAILY
Qty: 30 G | Refills: 3 | Status: SHIPPED | OUTPATIENT
Start: 2022-01-07 | End: 2022-07-14

## 2022-01-07 RX ORDER — RABEPRAZOLE SODIUM 20 MG/1
20 TABLET, DELAYED RELEASE ORAL DAILY
Qty: 90 TABLET | Refills: 3 | Status: SHIPPED | OUTPATIENT
Start: 2022-01-07

## 2022-01-07 NOTE — PATIENT INSTRUCTIONS
Continue with reflux diet and precautions, cut and chew food well, take time eating  Continue AcipHex daily, script removed  Use Proctocream HC twice a day as needed for hemorrhoidal irritation or bleeding  Recommend fiber supplement daily, Metamucil or generic psyllium husk 2 spoons daily with plenty of fluid

## 2022-01-07 NOTE — LETTER
January 7, 2022     Greg Mercedes DO  [address] [facility] [address] [facility]  [address] [facility]    Patient: Tien Grande   YOB: 1965   Date of Visit: 1/7/2022       Dear Dr Khan Leader: Thank you for referring Debi Begum to me for evaluation  Below are my notes for this consultation  If you have questions, please do not hesitate to call me  I look forward to following your patient along with you  Sincerely,        Collin Aguilar MD        CC: No Recipients  Collin Aguilar MD  1/7/2022  5:36 PM  Sign when Signing Visit  [facility] - Outpatient Follow-up Note  Tien Grande 64 y o  male MRN: [id number]  Encounter: [id number]    ASSESSMENT AND PLAN:      1  Gastroesophageal reflux disease, unspecified whether esophagitis present  - RABEprazole (ACIPHEX) 20 MG tablet; Take 1 tablet (20 mg total) by mouth daily  Dispense: 90 tablet; Refill: 3  2  Dysphagia, unspecified type  Reflux symptoms clinically stable without alarm symptoms of dysphagia weight loss or bleeding  Occasional discomfort with swallowing bread but otherwise no dysphagia  No breakthrough symptoms on rabeprazole  · Reflux diet and precautions  · Cut and chew food well, take time eating  · Continue with rabeprazole daily    3  Grade I hemorrhoids  Occasional hemorrhoid bleed  Colonoscopy without any other significant distal pathology  Discussed fiber and topical therapy  · Fiber therapy daily with Metamucil or psyllium husk 2 spoons daily  · Encourage plenty of dietary fiber and fluids  · Topical hydrocortisone cream b i d  as needed  - hydrocortisone (ANUSOL-HC) 2 5 % rectal cream; Apply topically 2 (two) times a day  Dispense: 30 g; Refill: 3    4  History of colon polyps  Up-to-date with surveillance for colorectal polyps  Recommend colonoscopy every 5 years    · Next colonoscopy due January 2026      Followup Appointment:  1 year sooner if needed  ______________________________________________________________________    Chief Complaint   Patient presents with    Follow up GERD     HPI:  GERD stable, still needs to watch foods  Breads and pretzels still some difficulty swallowing  No trouble with meats  No real haertburn or abdominal pain  Seeing blood with stool a few times a few weeks ago  Stools otherwise normal, no melena or diarrhea  Hemorrhoids noted on colonoscopy  Historical Information   Past Medical History:   Diagnosis Date    Colon polyp     COVID-19 12/2020    GERD (gastroesophageal reflux disease)     Melanoma ([address] ) 2018    [facility]    Testicular [facility]) 2000    Dr Isis Leyva and [facility] (urology)     Past Surgical History:   Procedure Laterality Date    COLONOSCOPY      1/11/21    ORCHIECTOMY Right     SKIN CANCER EXCISION      TUMOR EXCISION      right ear as a child    UPPER GASTROINTESTINAL ENDOSCOPY      September 2014 [facility] ring biopsies negative for Johnson's EOE or H pylori       Social History     Substance and Sexual Activity   Alcohol Use Yes    Alcohol/week: 1 0 standard drink    Types: 1 Cans of beer per week     Social History     Substance and Sexual Activity   Drug Use Never     Social History     Tobacco Use   Smoking Status Never Smoker   Smokeless Tobacco Never Used     Family History   Problem Relation Age of Onset    Hypertension Father     Heart disease Father     No Known Problems Sister     No Known Problems Brother     No Known Problems Brother     Colon polyps Neg Hx     Colon cancer Neg Hx          Current Outpatient Medications:     Multiple Vitamin (multivitamin) capsule    RABEprazole (ACIPHEX) 20 MG tablet    hydrocortisone (ANUSOL-HC) 2 5 % rectal cream  No Known Allergies  Reviewed medications and allergies and updated as indicated    PHYSICAL EXAM:    Blood pressure 142/86, height 5' 9" (1 753 m), weight 120 kg (265 lb 9 6 oz)  Body mass index is 39 22 kg/m²  General Appearance: NAD, cooperative, alert  Eyes: Anicteric, PERRLA, EOMI  ENT:  Normocephalic, atraumatic, normal mucosa  Neck:  Supple, symmetrical, trachea midline  Resp:  Clear to auscultation bilaterally; no rales, rhonchi or wheezing; respirations unlabored   CV:  S1 S2, Regular rate and rhythm; no murmur, rub, or gallop  GI:  Soft, non-tender, non-distended; normal bowel sounds; no masses, no organomegaly   Rectal: Deferred  Musculoskeletal: No cyanosis, clubbing or edema  Normal ROM  Skin:  No jaundice, rashes, or lesions   Heme/Lymph: No palpable cervical lymphadenopathy  Psych: Normal affect, good eye contact  Neuro: No gross deficits, AAOx3    Lab Results:   No results found for: WBC, HGB, HCT, MCV, PLT  Lab Results   Component Value Date    K 4 3 12/17/2021     12/17/2021    CO2 29 12/17/2021    BUN 14 12/17/2021    CREATININE 0 96 12/17/2021    GLUF 97 12/17/2021    CALCIUM 9 0 12/17/2021    AST 19 12/17/2021    ALT 30 12/17/2021    ALKPHOS 56 12/17/2021    EGFR 88 12/17/2021     No results found for: IRON, TIBC, FERRITIN  No results found for: LIPASE    Radiology Results:   No results found

## 2022-01-07 NOTE — PROGRESS NOTES
[address] [facility] - Outpatient Follow-up Note  Maris Webster 64 y o  male MRN: [id number]  Encounter: [id number]    ASSESSMENT AND PLAN:      1  Gastroesophageal reflux disease, unspecified whether esophagitis present  - RABEprazole (ACIPHEX) 20 MG tablet; Take 1 tablet (20 mg total) by mouth daily  Dispense: 90 tablet; Refill: 3  2  Dysphagia, unspecified type  Reflux symptoms clinically stable without alarm symptoms of dysphagia weight loss or bleeding  Occasional discomfort with swallowing bread but otherwise no dysphagia  No breakthrough symptoms on rabeprazole  · Reflux diet and precautions  · Cut and chew food well, take time eating  · Continue with rabeprazole daily    3  Grade I hemorrhoids  Occasional hemorrhoid bleed  Colonoscopy without any other significant distal pathology  Discussed fiber and topical therapy  · Fiber therapy daily with Metamucil or psyllium husk 2 spoons daily  · Encourage plenty of dietary fiber and fluids  · Topical hydrocortisone cream b i d  as needed  - hydrocortisone (ANUSOL-HC) 2 5 % rectal cream; Apply topically 2 (two) times a day  Dispense: 30 g; Refill: 3    4  History of colon polyps  Up-to-date with surveillance for colorectal polyps  Recommend colonoscopy every 5 years  · Next colonoscopy due January 2026      Followup Appointment:  1 year sooner if needed  ______________________________________________________________________    Chief Complaint   Patient presents with    Follow up GERD     HPI:  GERD stable, still needs to watch foods  Breads and pretzels still some difficulty swallowing  No trouble with meats  No real haertburn or abdominal pain  Seeing blood with stool a few times a few weeks ago  Stools otherwise normal, no melena or diarrhea  Hemorrhoids noted on colonoscopy      Historical Information   Past Medical History:   Diagnosis Date    Colon polyp     COVID-19 12/2020    GERD (gastroesophageal reflux disease)  [address] [address]    [facility]    [facility]) 2000    Dr Chuck Lira and [facility] (urology)     Past Surgical History:   Procedure Laterality Date    COLONOSCOPY      1/11/21    ORCHIECTOMY Right     SKIN CANCER EXCISION      TUMOR EXCISION      right ear as a child    UPPER GASTROINTESTINAL ENDOSCOPY      September 2014 [facility] ring biopsies negative for Johnson's EOE or H pylori  Social History     Substance and Sexual Activity   Alcohol Use Yes    Alcohol/week: 1 0 standard drink    Types: 1 Cans of beer per week     Social History     Substance and Sexual Activity   Drug Use Never     Social History     Tobacco Use   Smoking Status Never Smoker   Smokeless Tobacco Never Used     Family History   Problem Relation Age of Onset    Hypertension Father     Heart disease Father     No Known Problems Sister     No Known Problems Brother     No Known Problems Brother     Colon polyps Neg Hx     Colon cancer Neg Hx          Current Outpatient Medications:     Multiple Vitamin (multivitamin) capsule    RABEprazole (ACIPHEX) 20 MG tablet    hydrocortisone (ANUSOL-HC) 2 5 % rectal cream  No Known Allergies  Reviewed medications and allergies and updated as indicated    PHYSICAL EXAM:    Blood pressure 142/86, height 5' 9" (1 753 m), weight 120 kg (265 lb 9 6 oz)  Body mass index is 39 22 kg/m²  General Appearance: NAD, cooperative, alert  Eyes: Anicteric, PERRLA, EOMI  ENT:  Normocephalic, atraumatic, normal mucosa  Neck:  Supple, symmetrical, trachea midline  Resp:  Clear to auscultation bilaterally; no rales, rhonchi or wheezing; respirations unlabored   CV:  S1 S2, Regular rate and rhythm; no murmur, rub, or gallop  GI:  Soft, non-tender, non-distended; normal bowel sounds; no masses, no organomegaly   Rectal: Deferred  Musculoskeletal: No cyanosis, clubbing or edema  Normal ROM    Skin:  No jaundice, rashes, or lesions   Heme/Lymph: No palpable cervical lymphadenopathy  Psych: Normal affect, good eye contact  Neuro: No gross deficits, AAOx3    Lab Results:   No results found for: WBC, HGB, HCT, MCV, PLT  Lab Results   Component Value Date    K 4 3 12/17/2021     12/17/2021    CO2 29 12/17/2021    BUN 14 12/17/2021    CREATININE 0 96 12/17/2021    GLUF 97 12/17/2021    CALCIUM 9 0 12/17/2021    AST 19 12/17/2021    ALT 30 12/17/2021    ALKPHOS 56 12/17/2021    EGFR 88 12/17/2021     No results found for: IRON, TIBC, FERRITIN  No results found for: LIPASE    Radiology Results:   No results found

## 2022-01-14 ENCOUNTER — OFFICE VISIT (OUTPATIENT)
Dept: FAMILY MEDICINE CLINIC | Facility: CLINIC | Age: 57
End: 2022-01-14
Payer: COMMERCIAL

## 2022-01-14 DIAGNOSIS — Z23 NEED FOR VACCINATION: Primary | ICD-10-CM

## 2022-01-14 PROCEDURE — 90715 TDAP VACCINE 7 YRS/> IM: CPT

## 2022-01-14 PROCEDURE — 90471 IMMUNIZATION ADMIN: CPT

## 2022-04-04 ENCOUNTER — CONSULT (OUTPATIENT)
Dept: DERMATOLOGY | Facility: CLINIC | Age: 57
End: 2022-04-04
Payer: COMMERCIAL

## 2022-04-04 VITALS — WEIGHT: 264.55 LBS | TEMPERATURE: 98.2 F | BODY MASS INDEX: 39.18 KG/M2 | HEIGHT: 69 IN

## 2022-04-04 DIAGNOSIS — Z85.820 HISTORY OF MELANOMA: Primary | ICD-10-CM

## 2022-04-04 DIAGNOSIS — Z12.83 SCREENING FOR MALIGNANT NEOPLASM OF SKIN: ICD-10-CM

## 2022-04-04 PROCEDURE — 99243 OFF/OP CNSLTJ NEW/EST LOW 30: CPT | Performed by: DERMATOLOGY

## 2022-04-04 RX ORDER — TRIAMCINOLONE ACETONIDE 1 MG/ML
LOTION TOPICAL
Qty: 60 ML | Refills: 1 | Status: CANCELLED | OUTPATIENT
Start: 2022-04-04

## 2022-04-04 RX ORDER — TRIAMCINOLONE ACETONIDE 1 MG/ML
LOTION TOPICAL 3 TIMES DAILY
COMMUNITY
End: 2022-07-14

## 2022-04-04 RX ORDER — TRIAMCINOLONE ACETONIDE 0.25 MG/G
CREAM TOPICAL 2 TIMES DAILY
Qty: 30 G | Refills: 0 | Status: SHIPPED | OUTPATIENT
Start: 2022-04-04

## 2022-04-04 NOTE — PROGRESS NOTES
[facility] Note     Patient Name: Jermain Brink  Encounter Date: 4/4/2022     Have you been cared for by a [facility] in the last 3 years and, if so, which one? No    · Have you traveled outside of the [address] [place] [address] in the past 3 months or outside of the [facility] area in the last 2 weeks? No     May we call your Preferred Phone number to discuss your specific medical information? Yes     May we leave a detailed message that includes your specific medical information? Yes      Today's Chief Concerns:   Concern #1:  Hx of Melanoma 2017   Concern #2:  Full body skin check    Past Medical History:  Have you personally ever had or currently have any of the following? · Skin cancer (such as Melanoma, Basal Cell Carcinoma, Squamous Cell Carcinoma? (If Yes, please provide more detail)- YES, Melanoma 2017  · Eczema: No  · Psoriasis: No  · HIV/AIDS: No  · Hepatitis B or C: No  · Tuberculosis: No  · Systemic Immunosuppression such as Diabetes, Biologic or Immunotherapy, Chemotherapy, Organ Transplantation, Bone Marrow Transplantation (If YES, please provide more detail): No  · Radiation Treatment (If YES, please provide more detail): YES, Testicular cancer; [id number]  · Any other major medical conditions/concerns? (If Yes, which types)- No      Family History:  Have any of your "first degree relatives" (parent, brother, sister, or child) had any of the following       · Skin cancer such as Melanoma or Merkel Cell Carcinoma or Pancreatic Cancer? No  · Eczema, Asthma, Hay Fever or Seasonal Allergies: No  · Psoriasis or Psoriatic Arthritis: No  · Do any other medical conditions seem to run in your family? If Yes, what condition and which relatives?   No    Current Medications:       Current Outpatient Medications:     hydrocortisone (ANUSOL-HC) 2 5 % rectal cream, Apply topically 2 (two) times a day, Disp: 30 g, Rfl: 3    Multiple Vitamin (multivitamin) capsule, Take 1 capsule by mouth daily, Disp: , Rfl:     RABEprazole (ACIPHEX) 20 MG tablet, Take 1 tablet (20 mg total) by mouth daily, Disp: 90 tablet, Rfl: 3      Review of Systems:  Have you recently had or currently have any of the following? If YES, what are you doing for the problem? · Fever, chills or unintended weight loss: No  · Sudden loss or change in your vision: No  · Nausea, vomiting or blood in your stool: No  · Painful or swollen joints: No  · Wheezing or cough: No  · Changing mole or non-healing wound: No  · Nosebleeds: No  · Excessive sweating: No  · Easy or prolonged bleeding? No  · Over the last 2 weeks, how often have you been bothered by the following problems? · Taking little interest or pleasure in doing things: 1 - Not at All  · Feeling down, depressed, or hopeless: 1 - Not at All  · Rapid heartbeat with epinephrine:  No    · Any known allergies? No Known Allergies      Physical Exam:     Was a chaperone (Derm Clinical Assistant) present throughout the entire Physical Exam? Yes     Did the Dermatology Team specifically  the patient on the importance of a Full Skin Exam to be sure that nothing is missed clinically?  Yes}  o Did the patient ultimately request or accept a Full Skin Exam?  Yes  o Did the patient specifically refuse to have the areas "under-the-bra" examined by the Dermatologist? No  o Did the patient specifically refuse to have the areas "under-the-underwear" examined by the Dermatologist? No    CONSTITUTIONAL:   Vitals:    04/04/22 1036   Temp: 98 2 °F (36 8 °C)   TempSrc: Temporal   Weight: 120 kg (264 lb 8 8 oz)   Height: 5' 9" (1 753 m)       PSYCH: Normal mood and affect  EYES: Normal conjunctiva  ENT: Normal lips and oral mucosa  CARDIOVASCULAR: No edema  RESPIRATORY: Normal respirations  HEME/LYMPH/IMMUNO:  No regional lymphadenopathy except as noted below in "ASSESSMENT AND PLAN BY DIAGNOSIS"    SKIN:  FULL ORGAN SYSTEM EXAM   Hair, Scalp, Ears, Face Normal except as noted below in Assessment   Neck, Cervical Chain Nodes Normal except as noted below in Assessment   Right Arm/Hand/Fingers Normal except as noted below in Assessment   Left Arm/Hand/Fingers Normal except as noted below in Assessment   Chest/Breasts/Axillae Viewed areas Normal except as noted below in Assessment   Abdomen, Umbilicus Normal except as noted below in Assessment   Back/Spine Normal except as noted below in Assessment   Buttocks Normal except as noted below in Assessment   Right Leg, Foot, Toes Normal except as noted below in Assessment   Left Leg, Foot, Toes Normal except as noted below in Assessment        1  HISTORY OF MELANOMA    Physical Exam:   Anatomic Location Affected:  Right upper lateral back,    Morphological Description of Scar:  Well healed scar   Year Treated: 2017   TNM Classification: T1B   Suspected Recurrence: no   Regional adenopathy: yes    Additional History of Present Condition:  Patient had a history of melanoma on March 24, 2017  Patient was seeing [facility] every 6 months for a full body skin check  Patient stated he has had some things taken off since the melanoma  Patient's records are scanned into the chart under media  Assessment and Plan:  Based on a thorough discussion of this condition and the management approach to it (including a comprehensive discussion of the known risks, side effects and potential benefits of treatment), the patient (family) agrees to implement the following specific plan:   Well healed scar  No signs of recurrence    Continue to monitor for changes  What happens at follow-up? The main purpose of follow-up is to detect recurrences early (metastatic melanoma), but it also offers an opportunity to diagnose a new primary melanoma at the first possible opportunity   A second invasive melanoma occurs in 5-10% of melanoma patients and a new melanoma in situ is diagnosed in more than 20% of melanoma patients  Our practice makes the following recommendations for follow-up for patients with invasive melanoma   At-least "monthly" self-skin examinations    Routine skin checks by a board certified dermatologist   Follow-up intervals are "every 3 months" within 2 years of a new melanoma diagnosis; "every 6 months" between 2-4 years of a new melanoma diagnosis; and "annually" after 4 years of a new melanoma diagnosis   Individual patient's needs should be considered before an appropriate follow-up is offered   Provide education and support to help the patient adjust to their illness    Follow-up appointments should include:   A check of the scar where the primary melanoma was removed   Checking the regional lymph nodes   A general skin examination   A full physical examination at least annually by your primary care physician    In those with more advanced primary disease, follow-up may include:   Blood tests   Imaging: ultrasound, X-ray, CT, MRI and PET scan  Most tests are not worthwhile for patients with stage 1 or 2 melanoma unless there are signs or symptoms of disease recurrence or metastasis  No tests are necessary for healthy patients who have remained well for five years or longer after removal of their melanoma  What is the outlook for patients with melanoma?  Melanoma in situ is cured by excision because it has no potential to spread around the body   The risk of spread and ultimate death from invasive melanoma depends on several factors, but the main one is the Breslow thickness of the melanoma at the time it was surgically removed   Metastases are rare for melanomas < 0 75 mm and the risk for tumours 0 75-1 mm thick is about 5%  The risk steadily increases with thickness so that melanomas > 4 mm have a risk of metastasis of about 40%  Melanoma is a potentially serious type of skin cancer, in which there is uncontrolled growth of melanocytes (pigment cells)   Melanoma is sometimes called malignant melanoma  Normal melanocytes are found in the basal layer of the epidermis (the outer layer of skin)  Melanocytes produce a protein called melanin, which protects skin cells by absorbing ultraviolet (UV) radiation  Melanocytes are found in equal numbers in black and white skin, but melanocytes in black skin produce much more melanin  People with dark brown or black skin are very much less likely to be damaged by UV radiation than those with white skin  2  SCREENING SKIN EXAM     When outside we recommend using a wide brim hat, sunglasses, long sleeve and pants, sunscreen with SPF 38+ with reapplication every 2 hours, or SPF specific clothing    Benign, reassured   Annual skin check     Melanocytic Nevi  Melanocytic nevi ("moles") are tan or brown, raised or flat areas of the skin which have an increased number of melanocytes  Melanocytes are the cells in our body which make pigment and account for skin color  Some moles are present at birth (I e , "congenital nevi"), while others come up later in life (i e , "acquired nevi")  The sun can stimulate the body to make more moles  Sunburns are not the only thing that triggers more moles  Chronic sun exposure can do it too  Clinically distinguishing a healthy mole from melanoma may be difficult, even for experienced dermatologists  The "ABCDE's" of moles have been suggested as a means of helping to alert a person to a suspicious mole and the possible increased risk of melanoma  The suggestions for raising alert are as follows:    Asymmetry: Healthy moles tend to be symmetric, while melanomas are often asymmetric  Asymmetry means if you draw a line through the mole, the two halves do not match in color, size, shape, or surface texture  Asymmetry can be a result of rapid enlargement of a mole, the development of a raised area on a previously flat lesion, scaling, ulceration, bleeding or scabbing within the mole    Any mole that starts to demonstrate "asymmetry" should be examined promptly by a board certified dermatologist      Border: Healthy moles tend to have discrete, even borders  The border of a melanoma often blends into the normal skin and does not sharply delineate the mole from normal skin  Any mole that starts to demonstrate "uneven borders" should be examined promptly by a board certified dermatologist      Color: Healthy moles tend to be one color throughout  Melanomas tend to be made up of different colors ranging from dark black, blue, white, or red  Any mole that demonstrates a color change should be examined promptly by a board certified dermatologist      Diameter: Healthy moles tend to be smaller than 0 6 cm in size; an exception are "congenital nevi" that can be larger  Melanomas tend to grow and can often be greater than 0 6 cm (1/4 of an inch, or the size of a pencil eraser)  This is only a guideline, and many normal moles may be larger than 0 6 cm without being unhealthy  Any mole that starts to change in size (small to bigger or bigger to smaller) should be examined promptly by a board certified dermatologist      Evolving: Healthy moles tend to "stay the same "  Melanomas may often show signs of change or evolution such as a change in size, shape, color, or elevation  Any mole that starts to itch, bleed, crust, burn, hurt, or ulcerate or demonstrate a change or evolution should be examined promptly by a board certified dermatologist       Liliana Chaparro When outside we recommend using a wide brimhat, sunglasses, long sleeve and pants, sunscreen with SPF 99+ with reapplication every 2 hours, or SPF specific clothing     How can dry skin be prevented? Eliminate aggravating factors:   Reduce the frequency of bathing   A humidifier in winter and air conditioner in summer    Compare having a short shower with a prolonged soak in a bath   Use lukewarm, not hot, water      Replace standard soap with a substitute such as a synthetic detergent cleanser, water-miscible emollient, bath oil, anti-pruritic tar oil, colloidal oatmeal etc     Apply an emollient liberally and often, particularly shortly after bathing, and when itchy  The drier the skin, the thicker this should be, especially on the hands  What is the outlook for dry skin? A tendency to dry skin may persist life-long, or it may improve once contributing factors are controlled      Scribe Attestation    I,:  Erick Hussein am acting as a scribe while in the presence of the attending physician :       I,:  Jayden Smith MD personally performed the services described in this documentation    as scribed in my presence :

## 2022-04-04 NOTE — PATIENT INSTRUCTIONS
MELANOCYTIC NEVI ("Moles")    Assessment and Plan:  Based on a thorough discussion of this condition and the management approach to it (including a comprehensive discussion of the known risks, side effects and potential benefits of treatment), the patient (family) agrees to implement the following specific plan:   When outside we recommend using a wide brim hat, sunglasses, long sleeve and pants, sunscreen with SPF 30+ with reapplication every 2 hours, or SPF specific clothing    Benign, reassured   Annual skin check     Melanocytic Nevi  Melanocytic nevi ("moles") are tan or brown, raised or flat areas of the skin which have an increased number of melanocytes  Melanocytes are the cells in our body which make pigment and account for skin color  Some moles are present at birth (I e , "congenital nevi"), while others come up later in life (i e , "acquired nevi")  The sun can stimulate the body to make more moles  Sunburns are not the only thing that triggers more moles  Chronic sun exposure can do it too  Clinically distinguishing a healthy mole from melanoma may be difficult, even for experienced dermatologists  The "ABCDE's" of moles have been suggested as a means of helping to alert a person to a suspicious mole and the possible increased risk of melanoma  The suggestions for raising alert are as follows:    Asymmetry: Healthy moles tend to be symmetric, while melanomas are often asymmetric  Asymmetry means if you draw a line through the mole, the two halves do not match in color, size, shape, or surface texture  Asymmetry can be a result of rapid enlargement of a mole, the development of a raised area on a previously flat lesion, scaling, ulceration, bleeding or scabbing within the mole  Any mole that starts to demonstrate "asymmetry" should be examined promptly by a board certified dermatologist      Border: Healthy moles tend to have discrete, even borders    The border of a melanoma often blends into the normal skin and does not sharply delineate the mole from normal skin  Any mole that starts to demonstrate "uneven borders" should be examined promptly by a board certified dermatologist      Color: Healthy moles tend to be one color throughout  Melanomas tend to be made up of different colors ranging from dark black, blue, white, or red  Any mole that demonstrates a color change should be examined promptly by a board certified dermatologist      Diameter: Healthy moles tend to be smaller than 0 6 cm in size; an exception are "congenital nevi" that can be larger  Melanomas tend to grow and can often be greater than 0 6 cm (1/4 of an inch, or the size of a pencil eraser)  This is only a guideline, and many normal moles may be larger than 0 6 cm without being unhealthy  Any mole that starts to change in size (small to bigger or bigger to smaller) should be examined promptly by a board certified dermatologist      Evolving: Healthy moles tend to "stay the same "  Melanomas may often show signs of change or evolution such as a change in size, shape, color, or elevation  Any mole that starts to itch, bleed, crust, burn, hurt, or ulcerate or demonstrate a change or evolution should be examined promptly by a board certified dermatologist         Speedy Pierce and Plan:  Based on a thorough discussion of this condition and the management approach to it (including a comprehensive discussion of the known risks, side effects and potential benefits of treatment), the patient (family) agrees to implement the following specific plan:   When outside we recommend using a wide brim hat, sunglasses, long sleeve and pants, sunscreen with SPF 22+ with reapplication every 2 hours, or SPF specific clothing       What is a lentigo? A lentigo is a pigmented flat or slightly raised lesion with a clearly defined edge  Unlike an ephelis (freckle), it does not fade in the winter months   There are several kinds of lentigo  The name lentigo originally referred to its appearance resembling a small lentil  The plural of lentigo is lentigines, although lentigos is also in common use  Who gets lentigines? Lentigines can affect males and females of all ages and races  Solar lentigines are especially prevalent in fair skinned adults  Lentigines associated with syndromes are present at birth or arise during childhood  What causes lentigines? Common forms of lentigo are due to exposure to ultraviolet radiation:   Sun damage including sunburn    Indoor tanning    Phototherapy, especially photochemotherapy (PUVA)    Ionizing radiation, eg radiation therapy, can also cause lentigines  Several familial syndromes associated with widespread lentigines originate from mutations in Fredy-MAP kinase, mTOR signaling and PTEN pathways  What is the treatment for lentigines? Most lentigines are left alone  Attempts to lighten them may not be successful  The following approaches are used:   SPF 50+ broad-spectrum sunscreen    Hydroquinone bleaching cream    Alpha hydroxy acids    Vitamin C    Retinoids    Azelaic acid    Chemical peels  Individual lesions can be permanently removed using:   Cryotherapy    Intense pulsed light    Pigment lasers    How can lentigines be prevented? Lentigines associated with exposure ultraviolet radiation can be prevented by very careful sun protection  Clothing is more successful at preventing new lentigines than are sunscreens  What is the outlook for lentigines? Lentigines usually persist  They may increase in number with age and sun exposure  Some in sun-protected sites may fade and disappear          XEROSIS ("DRY SKIN")    Assessment and Plan:  Based on a thorough discussion of this condition and the management approach to it (including a comprehensive discussion of the known risks, side effects and potential benefits of treatment), the patient (family) agrees to implement the following specific plan:   Use moisturizer like Eucerin,Cerave or Aveeno Cream 3 times a day for the dry skin            Dry skin refers to skin that feels dry to touch  Dry skin has a dull surface with a rough, scaly quality  The skin is less pliable and cracked  When dryness is severe, the skin may become inflamed and fissured  Although any body site can be dry, dry skin tends to affect the shins more than any other site  Dry skin is lacking moisture in the outer horny cell layer (stratum corneum) and this results in cracks in the skin surface  Dry skin is also called xerosis, xeroderma or asteatosis (lack of fat)  It can affect males and females of all ages  There is some racial variability in water and lipid content of the skin   Dry skin that starts in early childhood may be one of about 20 types of ichthyosis (fish-scale skin)  There is often a family history of dry skin   Dry skin is commonly seen in people with atopic dermatitis   Nearly everyone > 60 years has dry skin  Dry skin that begins later may be seen in people with certain diseases and conditions   Postmenopausal women   Hypothyroidism   Chronic renal disease    Malnutrition and weight loss    Subclinical dermatitis    Treatment with certain drugs such as oral retinoids, diuretics and epidermal growth factor receptor inhibitors      What is the treatment for dry skin? The mainstay of treatment of dry skin and ichthyosis is moisturisers/emollients  They should be applied liberally and often enough to:   Reduce itch    Improve the barrier function    Prevent entry of irritants, bacteria    Reduce transepidermal water loss  How can dry skin be prevented? Eliminate aggravating factors:   Reduce the frequency of bathing   A humidifier in winter and air conditioner in summer    Compare having a short shower with a prolonged soak in a bath   Use lukewarm, not hot, water      Replace standard soap with a substitute such as a synthetic detergent cleanser, water-miscible emollient, bath oil, anti-pruritic tar oil, colloidal oatmeal etc     Apply an emollient liberally and often, particularly shortly after bathing, and when itchy  The drier the skin, the thicker this should be, especially on the hands  What is the outlook for dry skin? A tendency to dry skin may persist life-long, or it may improve once contributing factors are controlled

## 2022-07-14 ENCOUNTER — OFFICE VISIT (OUTPATIENT)
Dept: FAMILY MEDICINE CLINIC | Facility: CLINIC | Age: 57
End: 2022-07-14
Payer: COMMERCIAL

## 2022-07-14 ENCOUNTER — LAB (OUTPATIENT)
Dept: LAB | Facility: HOSPITAL | Age: 57
End: 2022-07-14
Payer: COMMERCIAL

## 2022-07-14 VITALS
HEART RATE: 73 BPM | TEMPERATURE: 98.3 F | OXYGEN SATURATION: 98 % | WEIGHT: 263.4 LBS | DIASTOLIC BLOOD PRESSURE: 82 MMHG | SYSTOLIC BLOOD PRESSURE: 130 MMHG | HEIGHT: 69 IN | BODY MASS INDEX: 39.01 KG/M2

## 2022-07-14 DIAGNOSIS — R59.0 CERVICAL LYMPHADENOPATHY: Primary | ICD-10-CM

## 2022-07-14 DIAGNOSIS — R59.0 VIRCHOW'S NODE: ICD-10-CM

## 2022-07-14 DIAGNOSIS — J02.9 PHARYNGITIS, UNSPECIFIED ETIOLOGY: ICD-10-CM

## 2022-07-14 LAB
CRP SERPL QL: 9.7 MG/L
S PYO AG THROAT QL: NEGATIVE

## 2022-07-14 PROCEDURE — 36415 COLL VENOUS BLD VENIPUNCTURE: CPT

## 2022-07-14 PROCEDURE — 86663 EPSTEIN-BARR ANTIBODY: CPT

## 2022-07-14 PROCEDURE — 86140 C-REACTIVE PROTEIN: CPT

## 2022-07-14 PROCEDURE — 87880 STREP A ASSAY W/OPTIC: CPT | Performed by: FAMILY MEDICINE

## 2022-07-14 PROCEDURE — 86665 EPSTEIN-BARR CAPSID VCA: CPT

## 2022-07-14 PROCEDURE — 99213 OFFICE O/P EST LOW 20 MIN: CPT | Performed by: FAMILY MEDICINE

## 2022-07-14 PROCEDURE — 86664 EPSTEIN-BARR NUCLEAR ANTIGEN: CPT

## 2022-07-14 NOTE — PROGRESS NOTES
Assessment/Plan:    No problem-specific Assessment & Plan notes found for this encounter  Diagnoses and all orders for this visit:    Cervical lymphadenopathy  -     CBC and differential; Future  -     CMV IgG/IgM Antibodies; Future  -     EBV acute panel; Future  -     C-reactive protein; Future  -     POCT rapid strepA  -     US head neck soft tissue; Future  -     CBC and differential  -     CMV IgG/IgM Antibodies  -     EBV acute panel  -     C-reactive protein    Pharyngitis, unspecified etiology  -     POCT rapid strepA    Other orders  -     Multiple Vitamins-Minerals (MULTIVITAMIN ADULTS PO); Take 1 capsule by mouth daily   Rapid strep in office today was negative  ? Reactive node vs secondary to malignancy   will check some labs and will get ultrasound of neck to start with     consider CT neck if persists or if findings on ultrasound are concerning maxx due to patient's cancer history      Subjective:      Patient ID: Jasson Dexter is a 64 y o  male with history of GERD, testicular CA, melanoma here today for complaint of "lump on neck"  Lump is located on right side of his neck and he first noticed it this AM when he was just sitting an appointment and feeling around on neck  Not painful to touch  He is not certain how long this may have been present    He does report having sore throat for last 2 nights  No other URI sx  Denies any rhinorrhea, nasal congestion, cough  No scalp rash  No other lymph node swelling that he is aware of  No fever or chills  No weight loss  No rashes at all  Does not have cat at home and denies any cat scratches  HPI    The following portions of the patient's history were reviewed and updated as appropriate:   He  has a past medical history of Colon polyp, COVID-19 (12/2020), GERD (gastroesophageal reflux disease), Melanoma ([facility] [address] ) (2017), and [facility] ([facility])  He  has a past surgical history that includes Orchiectomy (Right);  Skin cancer excision; Tumor excision; Colonoscopy; and Upper gastrointestinal endoscopy  He  reports that he has never smoked  He has never used smokeless tobacco  He reports current alcohol use of about 1 0 standard drink of alcohol per week  He reports that he does not use drugs  Current Outpatient Medications on File Prior to Visit   Medication Sig    Multiple Vitamins-Minerals (MULTIVITAMIN ADULTS PO) Take 1 capsule by mouth daily    RABEprazole (ACIPHEX) 20 MG tablet Take 1 tablet (20 mg total) by mouth daily    triamcinolone (KENALOG) 0 025 % cream Apply topically 2 (two) times a day Apply sparingly 2-4 times a day for up to one week   [DISCONTINUED] hydrocortisone (ANUSOL-HC) 2 5 % rectal cream Apply topically 2 (two) times a day (Patient not taking: No sig reported)    [DISCONTINUED] Multiple Vitamin (multivitamin) capsule Take 1 capsule by mouth daily (Patient not taking: Reported on 7/14/2022)    [DISCONTINUED] triamcinolone (KENALOG) 0 1 % lotion Apply topically 3 (three) times a day (Patient not taking: Reported on 7/14/2022)     No current facility-administered medications on file prior to visit  He has No Known Allergies       Review of Systems      Objective:      /82 (BP Location: Left arm, Patient Position: Sitting, Cuff Size: Large)   Pulse 73   Temp 98 3 °F (36 8 °C) (Tympanic)   Ht 5' 9" (1 753 m)   Wt 119 kg (263 lb 6 4 oz)   SpO2 98%   BMI 38 90 kg/m²          Physical Exam  Vitals and nursing note reviewed  Constitutional:       General: He is not in acute distress  Appearance: Normal appearance  He is obese  He is not ill-appearing, toxic-appearing or diaphoretic  HENT:      Head: Normocephalic and atraumatic        Left Ear: Tympanic membrane normal       Ears:      Comments: Right TM obscured by some opaque secretions in ear canal     Nose: Nose normal       Mouth/Throat:      Mouth: Mucous membranes are moist       Pharynx: No oropharyngeal exudate or posterior oropharyngeal erythema  Eyes:      Extraocular Movements: Extraocular movements intact  Conjunctiva/sclera: Conjunctivae normal       Pupils: Pupils are equal, round, and reactive to light  Cardiovascular:      Rate and Rhythm: Normal rate and regular rhythm  Heart sounds: No murmur heard  Pulmonary:      Effort: Pulmonary effort is normal       Breath sounds: Normal breath sounds  Chest:   Breasts:      Right: No axillary adenopathy or supraclavicular adenopathy  Left: No axillary adenopathy or supraclavicular adenopathy  Abdominal:      General: Abdomen is flat  Bowel sounds are normal  There is no distension  Palpations: Abdomen is soft  There is no mass  Tenderness: There is no abdominal tenderness  Comments: No organomegaly   Musculoskeletal:      Cervical back: Normal range of motion and neck supple  Right lower leg: No edema  Left lower leg: No edema  Lymphadenopathy:      Head:      Right side of head: No submental, submandibular, preauricular or occipital adenopathy  Left side of head: No submandibular, preauricular or occipital adenopathy  Cervical: Cervical adenopathy (right sided 2 cm node  immoble and slightly tender with no overlying erythma located laterally  no other enlarged cervical nodes noted   ) present  Upper Body:      Right upper body: No supraclavicular or axillary adenopathy  Left upper body: No supraclavicular or axillary adenopathy  Skin:     General: Skin is warm and dry  Findings: No rash  Neurological:      General: No focal deficit present  Mental Status: He is alert and oriented to person, place, and time     Psychiatric:         Mood and Affect: Mood normal

## 2022-07-15 LAB
EBV NA IGG SER IA-ACNC: >600 U/ML (ref 0–17.9)
EBV VCA IGG SER IA-ACNC: >600 U/ML (ref 0–17.9)
EBV VCA IGM SER IA-ACNC: <36 U/ML (ref 0–35.9)
INTERPRETATION: ABNORMAL

## 2022-07-16 ENCOUNTER — HOSPITAL ENCOUNTER (OUTPATIENT)
Dept: ULTRASOUND IMAGING | Facility: HOSPITAL | Age: 57
Discharge: HOME | End: 2022-07-16
Payer: COMMERCIAL

## 2022-07-16 DIAGNOSIS — R59.0 CERVICAL LYMPHADENOPATHY: ICD-10-CM

## 2022-07-16 PROCEDURE — 76536 US EXAM OF HEAD AND NECK: CPT

## 2022-07-18 ENCOUNTER — TELEPHONE (OUTPATIENT)
Dept: FAMILY MEDICINE CLINIC | Facility: CLINIC | Age: 57
End: 2022-07-18

## 2022-07-18 ENCOUNTER — APPOINTMENT (OUTPATIENT)
Dept: LAB | Facility: HOSPITAL | Age: 57
End: 2022-07-18
Payer: COMMERCIAL

## 2022-07-18 DIAGNOSIS — Z00.00 ROUTINE GENERAL MEDICAL EXAMINATION AT A HEALTH CARE FACILITY: ICD-10-CM

## 2022-07-18 DIAGNOSIS — R59.0 CERVICAL LYMPHADENOPATHY: Primary | ICD-10-CM

## 2022-07-18 LAB
BASOPHILS # BLD AUTO: 0.05 THOUSANDS/ΜL (ref 0–0.1)
BASOPHILS NFR BLD AUTO: 1 % (ref 0–1)
EOSINOPHIL # BLD AUTO: 0.15 THOUSAND/ΜL (ref 0–0.61)
EOSINOPHIL NFR BLD AUTO: 3 % (ref 0–6)
ERYTHROCYTE [DISTWIDTH] IN BLOOD BY AUTOMATED COUNT: 13.2 % (ref 11.6–15.1)
HCT VFR BLD AUTO: 41.7 % (ref 36.5–49.3)
HGB BLD-MCNC: 14.2 G/DL (ref 12–17)
IMM GRANULOCYTES # BLD AUTO: 0.02 THOUSAND/UL (ref 0–0.2)
IMM GRANULOCYTES NFR BLD AUTO: 0 % (ref 0–2)
LYMPHOCYTES # BLD AUTO: 1.11 THOUSANDS/ΜL (ref 0.6–4.47)
LYMPHOCYTES NFR BLD AUTO: 23 % (ref 14–44)
MCH RBC QN AUTO: 29.5 PG (ref 26.8–34.3)
MCHC RBC AUTO-ENTMCNC: 34.1 G/DL (ref 31.4–37.4)
MCV RBC AUTO: 87 FL (ref 82–98)
MONOCYTES # BLD AUTO: 0.72 THOUSAND/ΜL (ref 0.17–1.22)
MONOCYTES NFR BLD AUTO: 15 % (ref 4–12)
NEUTROPHILS # BLD AUTO: 2.89 THOUSANDS/ΜL (ref 1.85–7.62)
NEUTS SEG NFR BLD AUTO: 58 % (ref 43–75)
NRBC BLD AUTO-RTO: 0 /100 WBCS
PLATELET # BLD AUTO: 200 THOUSANDS/UL (ref 149–390)
PMV BLD AUTO: 11.2 FL (ref 8.9–12.7)
RBC # BLD AUTO: 4.82 MILLION/UL (ref 3.88–5.62)
WBC # BLD AUTO: 4.94 THOUSAND/UL (ref 4.31–10.16)

## 2022-07-18 PROCEDURE — 85025 COMPLETE CBC W/AUTO DIFF WBC: CPT

## 2022-07-18 PROCEDURE — 36415 COLL VENOUS BLD VENIPUNCTURE: CPT

## 2022-07-18 NOTE — TELEPHONE ENCOUNTER
----- Message from Arjun Johnson, DO sent at 7/18/2022 12:15 PM EDT -----  Please call lab to find out why they did not do the CBC?   ----- Message -----  From: Lab, Background User  Sent: 7/14/2022   5:51 PM EDT  To: Arjun Johnson, DO

## 2022-07-18 NOTE — TELEPHONE ENCOUNTER
S- situation -  Pt's wife called in c/o pt's abnormal labs  T- Task -  Pt's wife requests that we call her directly with the results  A- Action - Call pt's wife directly  R - Rationale - Msg sent as a result of a firect request from pt's wife/     S- Summary - Once labs are resulted and interpreted, please call pt's wife directly with the results so that the results can be best explained

## 2022-08-01 ENCOUNTER — APPOINTMENT (OUTPATIENT)
Dept: LAB | Facility: HOSPITAL | Age: 57
End: 2022-08-01

## 2022-08-01 DIAGNOSIS — Z00.8 HEALTH EXAMINATION IN POPULATION SURVEY: ICD-10-CM

## 2022-08-01 LAB
CHOLEST SERPL-MCNC: 179 MG/DL
EST. AVERAGE GLUCOSE BLD GHB EST-MCNC: 100 MG/DL
HBA1C MFR BLD: 5.1 %
HDLC SERPL-MCNC: 47 MG/DL
LDLC SERPL CALC-MCNC: 112 MG/DL (ref 0–100)
NONHDLC SERPL-MCNC: 132 MG/DL
TRIGL SERPL-MCNC: 100 MG/DL

## 2022-08-01 PROCEDURE — 83036 HEMOGLOBIN GLYCOSYLATED A1C: CPT

## 2022-08-01 PROCEDURE — 36415 COLL VENOUS BLD VENIPUNCTURE: CPT

## 2022-08-01 PROCEDURE — 80061 LIPID PANEL: CPT

## 2022-08-02 ENCOUNTER — TELEPHONE (OUTPATIENT)
Dept: FAMILY MEDICINE CLINIC | Facility: CLINIC | Age: 57
End: 2022-08-02

## 2022-08-02 NOTE — TELEPHONE ENCOUNTER
----- Message from Elisha Croft DO sent at 8/2/2022  8:14 AM EDT -----  Please let patient know that his cholesterol was good  A1c was normal as well

## 2022-08-29 ENCOUNTER — HOSPITAL ENCOUNTER (OUTPATIENT)
Dept: CT IMAGING | Facility: HOSPITAL | Age: 57
Discharge: HOME | End: 2022-08-29
Payer: COMMERCIAL

## 2022-08-29 DIAGNOSIS — R59.0 CERVICAL LYMPHADENOPATHY: ICD-10-CM

## 2022-08-29 DIAGNOSIS — Z85.820 HISTORY OF MELANOMA: ICD-10-CM

## 2022-08-29 PROCEDURE — G1004 CDSM NDSC: HCPCS

## 2022-08-29 PROCEDURE — 70491 CT SOFT TISSUE NECK W/DYE: CPT

## 2022-08-29 RX ADMIN — IOHEXOL 65 ML: 350 INJECTION, SOLUTION INTRAVENOUS at 10:43

## 2022-09-01 ENCOUNTER — TELEPHONE (OUTPATIENT)
Dept: FAMILY MEDICINE CLINIC | Facility: CLINIC | Age: 57
End: 2022-09-01

## 2022-09-01 DIAGNOSIS — J33.9 NASAL POLYP: Primary | ICD-10-CM

## 2022-09-01 NOTE — TELEPHONE ENCOUNTER
----- Message from Lucia Irvin DO sent at 9/1/2022 11:00 AM EDT -----  Please let patient know that CT of neck showed no enlarged lymph nodes  Incidental finding of polyps in nasal cavity for which ENT evaluation is recommended   Referral placed for [facility]

## 2022-10-27 ENCOUNTER — OFFICE VISIT (OUTPATIENT)
Dept: DERMATOLOGY | Facility: CLINIC | Age: 57
End: 2022-10-27

## 2022-10-27 VITALS — HEIGHT: 69 IN | BODY MASS INDEX: 39.25 KG/M2 | WEIGHT: 265 LBS

## 2022-10-27 DIAGNOSIS — Z85.828 HISTORY OF BASAL CELL CARCINOMA: ICD-10-CM

## 2022-10-27 DIAGNOSIS — D22.9 MULTIPLE BENIGN MELANOCYTIC NEVI: ICD-10-CM

## 2022-10-27 DIAGNOSIS — D18.01 CHERRY ANGIOMA: ICD-10-CM

## 2022-10-27 DIAGNOSIS — Z85.820 HISTORY OF MELANOMA: ICD-10-CM

## 2022-10-27 DIAGNOSIS — L21.9 SEBORRHEIC DERMATITIS: Primary | ICD-10-CM

## 2022-10-27 DIAGNOSIS — B35.3 TINEA PEDIS OF BOTH FEET: ICD-10-CM

## 2022-10-27 DIAGNOSIS — L82.1 SEBORRHEIC KERATOSIS: ICD-10-CM

## 2022-10-27 RX ORDER — KETOCONAZOLE 20 MG/G
CREAM TOPICAL
Qty: 60 G | Refills: 5 | Status: SHIPPED | OUTPATIENT
Start: 2022-10-27

## 2022-10-27 NOTE — PATIENT INSTRUCTIONS
HISTORY OF MELANOMA      Assessment and Plan:  Based on a thorough discussion of this condition and the management approach to it (including a comprehensive discussion of the known risks, side effects and potential benefits of treatment), the patient (family) agrees to implement the following specific plan: Will monitor for recurrence    What happens at follow-up? The main purpose of follow-up is to detect recurrences early (metastatic melanoma), but it also offers an opportunity to diagnose a new primary melanoma at the first possible opportunity  A second invasive melanoma occurs in 5-10% of melanoma patients and a new melanoma in situ is diagnosed in more than 20% of melanoma patients  Our practice makes the following recommendations for follow-up for patients with invasive melanoma  At-least "monthly" self-skin examinations   Routine skin checks by a board certified dermatologist  Follow-up intervals are "every 3 months" within 2 years of a new melanoma diagnosis; "every 6 months" between 2-4 years of a new melanoma diagnosis; and "annually" after 4 years of a new melanoma diagnosis  Individual patient's needs should be considered before an appropriate follow-up is offered  Provide education and support to help the patient adjust to their illness    Follow-up appointments should include:  A check of the scar where the primary melanoma was removed  Checking the regional lymph nodes  A general skin examination  A full physical examination at least annually by your primary care physician    In those with more advanced primary disease, follow-up may include:  Blood tests  Imaging: ultrasound, X-ray, CT, MRI and PET scan  Most tests are not worthwhile for patients with stage 1 or 2 melanoma unless there are signs or symptoms of disease recurrence or metastasis  No tests are necessary for healthy patients who have remained well for five years or longer after removal of their melanoma      What is the outlook for patients with melanoma? Melanoma in situ is cured by excision because it has no potential to spread around the body  The risk of spread and ultimate death from invasive melanoma depends on several factors, but the main one is the Breslow thickness of the melanoma at the time it was surgically removed  Metastases are rare for melanomas < 0 75 mm and the risk for tumours 0 75-1 mm thick is about 5%  The risk steadily increases with thickness so that melanomas > 4 mm have a risk of metastasis of about 40%  Melanoma is a potentially serious type of skin cancer, in which there is uncontrolled growth of melanocytes (pigment cells)  Melanoma is sometimes called malignant melanoma  Normal melanocytes are found in the basal layer of the epidermis (the outer layer of skin)  Melanocytes produce a protein called melanin, which protects skin cells by absorbing ultraviolet (UV) radiation  Melanocytes are found in equal numbers in black and white skin, but melanocytes in black skin produce much more melanin  People with dark brown or black skin are very much less likely to be damaged by UV radiation than those with white skin  HISTORY OF BASAL CELL CARCINOMA      Assessment and Plan:  Based on a thorough discussion of this condition and the management approach to it (including a comprehensive discussion of the known risks, side effects and potential benefits of treatment), the patient (family) agrees to implement the following specific plan: Will monitor for recurrence    How can basal cell carcinoma be prevented? The most important way to prevent BCC is to avoid sunburn  This is especially important in childhood and early life  Fair skinned individuals and those with a personal or family history of BCC should protect their skin from sun exposure daily, year-round and lifelong    Stay indoors or under the shade in the middle of the day   Wear covering clothing   Apply high protection factor SPF50+ broad-spectrum sunscreens generously to exposed skin if outdoors   Avoid indoor tanning (sun beds, solaria)  Oral nicotinamide (vitamin B3) in a dose of 500 mg twice daily may reduce the number and severity of BCCs  What is the outlook for basal cell carcinoma? Most BCCs are cured by treatment  Cure is most likely if treatment is undertaken when the lesion is small  About 50% of people with BCC develop a second one within 3 years of the first  They are also at increased risk of other skin cancers, especially melanoma  Regular self-skin examinations and long-term annual skin checks by an experienced health professional are recommended  MELANOCYTIC NEVI ("Moles")      Assessment and Plan:  Based on a thorough discussion of this condition and the management approach to it (including a comprehensive discussion of the known risks, side effects and potential benefits of treatment), the patient (family) agrees to implement the following specific plan: Will monitor for changes     Melanocytic Nevi  Melanocytic nevi ("moles") are caused by collections of the color producing skin cells, or melanocytes, in 1 area in the skin  They can range in color from pink to dark brown and be either raised or flat  Some moles are present at birth (I e , "congenital nevi"), while others come up later in life (i e , "acquired nevi")  [facility] exposure also stimulates the body to make more moles, ie the more sun you get the more moles you'll grow  Clinically distinguishing a healthy mole from melanoma may be difficult  The "ABCDE's" of moles have been suggested as a means of helping to alert a person to a suspicious mole and the possible increased risk of melanoma  Asymmetry: Healthy moles tend to be symmetric, while melanomas are often asymmetric    Asymmetry means if you draw a line through the mole, the two halves do not match in color, size, shape, or surface texture Any mole that starts to demonstrate "asymmetry" should be examined promptly by a board certified dermatologist      Karissa Harman: Healthy moles tend to have discrete, even borders  The border of a melanoma often blends into the normal skin and does not sharply delineate the mole from normal skin  Any mole that starts to demonstrate "uneven borders" should be examined promptly     Color: Healthy moles tend to be one color throughout  Melanomas tend to be made up of different colors ranging from dark black, blue, white, or red  Any mole that demonstrates a color change should be examined promptly    Diameter: Healthy moles tend to be smaller than 0 6 cm in size; an exception are "congenital nevi" that can be larger  Melanomas tend to grow and can often be greater than 0 6 cm (1/4 of an inch, or the size of a pencil eraser)  This is only a guideline, and many normal moles may be larger than 0 6 cm without being unhealthy  Any mole that starts to change in size (small to bigger or bigger to smaller) should be examined promptly    Evolving: Healthy moles tend to "stay the same "  Melanomas may often show signs of change or evolution such as a change in size, shape, color, or elevation  Any mole that starts to itch, bleed, crust, burn, hurt, or ulcerate or demonstrate a change or evolution should be examined promptly by a board certified dermatologist       What are atypical moles or dysplastic nevi? Dysplastic moles are moles that have some of the ABCDE  changes listed above but  are not cancerous  Sometimes a biopsy and microscopic examination are needed to determine the difference  They may indicate an increased risk of melanoma in that person, especially if there is a family history of melanoma  What is a Melanoma? The main concern when looking at a new or changing mole it to evaluate whether it may be a melanoma  The appearance of a "new mole" remains one of the most reliable methods for identifying a malignant melanoma     A melanoma is a type of skin cancer that can be deadly if it spreads throughout the body  The prognosis of a Melanoma depends on how deep it has penetrated in the skin  If caught early, they generally will not have had time to grow into the deeper layers of the skin and they cure rate is then very high  Once the melanoma grows deeper into the skin, the cure rate drops dramatically  Therefore, early detection and removal of a malignant melanoma results in a much better chance of complete cure  CHERRY ANGIOMAS      Assessment and Plan:  Based on a thorough discussion of this condition and the management approach to it (including a comprehensive discussion of the known risks, side effects and potential benefits of treatment), the patient (family) agrees to implement the following specific plan:    Reassured benign    Assessment and Plan:    Cherry angioma, also known as Ronne Shiver spots, are benign vascular skin lesions  A "cherry angioma" is a firm red, blue or purple papule, 0 1-1 cm in diameter  When thrombosed, they can appear black in colour until evaluated with a dermatoscope when the red or purple colour is more easily seen  Cherry angioma may develop on any part of the body but most often appear on the scalp, face, lips and trunk  An angioma is due to proliferating endothelial cells; these are the cells that line the inside of a blood vessel  Angiomas can arise in early life or later in life; the most common type of angioma is a cherry angioma  Cherry angiomas are very common in males and females of any age or race  They are more noticeable in white skin than in skin of colour  They markedly increase in number from about the age of 36  There may be a family history of similar lesions  Eruptive cherry angiomas have been rarely reported to be associated with internal malignancy  The cause of angiomas is unknown   Genetic analysis of cherry angiomas has shown that they frequently carry specific somatic missense mutations in the GNAQ and GNA11 (Q209H) genes, which are involved in other vascular and melanocytic proliferations  Cherry angioma is usually diagnosed clinically and no investigations are necessary for the majority of lesions  It has a characteristic red-clod or lobular pattern on dermatoscopy (called lacunar pattern using conventional pattern analysis)  When there is uncertainty about the diagnosis, a biopsy may be performed  The angioma is composed of venules in a thickened papillary dermis  Collagen bundles may be prominent between the lobules  Cherry angiomas are harmless, so they do not usually have to be treated  Occasionally, they are removed to exclude a malignant skin lesion such as a nodular melanoma or because they are irritated or bleeding (and a subsequent risk for infection)  To decrease friction over the lesions, we recommend Neutrogena Daily Defense SPF 50+ at least 3 times a day  SEBORRHEIC KERATOSIS; NON-INFLAMED          Assessment and Plan:  Based on a thorough discussion of this condition and the management approach to it (including a comprehensive discussion of the known risks, side effects and potential benefits of treatment), the patient (family) agrees to implement the following specific plan:    Reassured benign    Seborrheic Keratosis  A seborrheic keratosis is a harmless warty spot that appears during adult life as a common sign of skin aging  Seborrheic keratoses can arise on any area of skin, covered or uncovered, with the usual exception of the palms and soles  They do not arise from mucous membranes  Seborrheic keratoses can have highly variable appearance  Seborrheic keratoses are extremely common  It has been estimated that over 90% of adults over the age of 61 years have one or more of them  They occur in males and females of all races, typically beginning to erupt in the 35s or 45s  They are uncommon under the age of 21 years  The precise cause of seborrhoeic keratoses is not known  Seborrhoeic keratoses are considered degenerative in nature  As time goes by, seborrheic keratoses tend to become more numerous  Some people inherit a tendency to develop a very large number of them; some people may have hundreds of them  The name "seborrheic keratosis" is misleading, because these lesions are not limited to a seborrhoeic distribution (scalp, mid-face, chest, upper back), nor are they formed from sebaceous glands, nor are they associated with sebum -- which is greasy  Seborrheic keratosis may also be called "SK," "Seb K," "basal cell papilloma," "senile wart," or "barnacle "      Researchers have noted:  Eruptive seborrhoeic keratoses can follow sunburn or dermatitis  Skin friction may be the reason they appear in body folds  Viral cause (e g , human papillomavirus) seems unlikely  Stable and clonal mutations or activation of FRFR3, PIK3CA, YARELY, AKT1 and EGFR genes are found in seborrhoeic keratoses  Seborrhoeic keratosis can arise from solar lentigo  FRFR3 mutations also arise in solar lentigines  These mutations are associated with increased age and location on the head and neck, suggesting a role of ultraviolet radiation in these lesions  Seborrheic keratoses do not harbour tumour suppressor gene mutations  Epidermal growth factor receptor inhibitors, which are used to treat some cancers, often result in an increase in verrucal (warty) keratoses  There is no easy way to remove multiple lesions on a single occasion  Unless a specific lesion is "inflamed" and is causing pain or stinging/burning or is bleeding, most insurance companies do not authorize treatment      TINEA PEDIS ("ATHLETE'S FOOT")      Assessment and Plan:  Based on a thorough discussion of this condition and the management approach to it (including a comprehensive discussion of the known risks, side effects and potential benefits of treatment), the patient (family) agrees to implement the following specific plan:    Ketoconazole 2% cream once daily to feet    Tinea Pedis  Tinea pedis is a fungal infection of the foot and is in fact the most common fungal infection  Tinea pedis is caused by dermatophyte fungi with the three most common being Trichophyton (T ) rubrum, T  interdigitale and Epidermophyton floccosum  Tinea pedis most commonly involves the interdigital spaces, known as "athlete's foot " Other typical sites include the toenails, groin, and palms of the hands  There are four major manifestations of tinea pedis including chronic hyperkeratotic, chronic intertriginous, acute ulcerative and vesicobullous  Signs and symptoms include:   Itchiness, redness, and scaling between the toes  Scales covering the soles and sides of the feet  Blisters over the inner aspect of the feet    It is particularly common in hot, tropical, and urban environments where sweating in the feet facilitate fungal growth  Risk factors for development include:  Occlusive footwear  Excessive swearing  Diabetes or other underlying immunosuppression   Poor peripheral circulation     The diagnosis of tinea pedis can usually be made via good history and physical exam due to its characteristic clinical features  Diagnosis can be confirmed by examining skin scrapings under the microscope  Cultures are occasionally done but may not be necessary if fungi are seen under microscopy  Other diagnoses to consider if patients do not respond to therapy include psoriasis, contact dermatitis, and eczema  Tinea pedis can be treated with topical antifungal drugs applied to affected areas on a repeated basis (usually 2 twice a day) for 2 to 4 weeks  Common topical medications include topical ketoconazole, allylamines, butenafine, ciclopirox, and tolnaftate  In cases that do not respond to topical therapy, oral antifungal agents may be used which include terbinafine, itraconazole, fluconazole and griseofulvin   These oral agents are also used to treat tinea capitis (fungal infection of the scalp) and onychomycosis (fungal infection of the nails)  Those with pre-existing liver problems are usually screened for liver function prior to starting oral terbinafine  Tinea pedis can be prevented by making sure feet are clean and dry with protective footwear worn in communal facilities  Other recommendations are:  Using drying foot powders when wearing occlusive shoes   Thoroughly dry shoes and boots prior to wearing them   Making sure to clean contaminated bathroom floors with bleach   Treatment of family members and other close contacts    31 Mony Henning and Plan:  Based on a thorough discussion of this condition and the management approach to it (including a comprehensive discussion of the known risks, side effects and potential benefits of treatment), the patient (family) agrees to implement the following specific plan:    Stop triamcinolone 0 025% cream   Apply ketoconazole 2% cream once daily       Seborrheic Dermatitis   Seborrheic dermatitis is a common, chronic or relapsing form of eczema/dermatitis that mainly affects the sebaceous, gland-rich regions of the scalp, face, and trunk  There are infantile and adult forms of seborrhoeic dermatitis  It is sometimes associated with psoriasis and, in that clinical scenario, may be referred to as "sebo-psoriasis "  Seborrheic dermatitis is also known as "seborrheic eczema "  Dandruff (also called "pityriasis capitis") is an uninflamed form of seborrhoeic dermatitis  Dandruff presents as bran-like scaly patches scattered within hair-bearing areas of the scalp  In an infant, this condition may be referred to as "cradle cap "  The cause of seborrheic dermatitis is not completely understood  It is associated with proliferation of various species of the skin commensal Malassezia, in its yeast (non-pathogenic) form   Its metabolites (such as the fatty acids oleic acid, malssezin, and indole-3-carbaldehyde) may cause an inflammatory reaction  Differences in skin barrier lipid content and function may account for individual presentations  Infantile Seborrheic Dermatitis  Infantile seborrheic dermatitis affects babies under the age of 1 months and usually resolves by 1012 months of age  Infantile seborrheic dermatitis causes "cradle cap" (diffuse, greasy scaling on scalp)  The rash may spread to affect armpit and groin folds (a type of "napkin dermatitis")  There may be associated salmon-pink colored patches that may flake or peel  The rash in this case is usually not especially itchy, so the baby often appears undisturbed by the rash, even when more generalized  Adult Seborrheic Dermatitis  Adult seborrheic dermatitis tends to begin in late adolescence; prevalence is greatest in young adults and in the elderly  It is more common in males than in females  The following factors are sometimes associated with severe adult seborrheic dermatitis:  Oily skin  Familial tendency to seborrhoeic dermatitis or a family history of psoriasis  Immunosuppression: organ transplant recipient, human immunodeficiency virus (HIV) infection and patients with lymphoma  Neurological and psychiatric diseases: Parkinson disease, tardive dyskinesia, depression, epilepsy, facial nerve palsy, spinal cord injury and congenital disorders such as Down syndrome  Treatment for psoriasis with psoralen and ultraviolet A (PUVA) therapy  Lack of sleep  Stressful events  In adults, seborrheic dermatitis may typically affect the scalp, face (creases around the nose, behind ears, within eyebrows) and upper trunk  Typical clinical features include:   Winter flares, improving in summer following sun exposure  Minimal itch most of the time  Combination oily and dry mid-facial skin  Ill-defined localized scaly patches or diffuse scale in the scalp  Blepharitis; scaly red eyelid margins  Rodanthe-pink, thin, scaly, and ill-defined plaques in skin folds on both sides of the face  Petal or ring-shaped flaky patches on hair-line and on anterior chest  Rash in armpits, under the breasts, in the groin folds and genital creases  Superficial folliculitis (inflamed hair follicles) on cheeks and upper trunk    Seborrheic dermatitis is diagnosed by its clinical appearance and behavior  Skin biopsy may be helpful but is rarely necessary to make this diagnosis

## 2022-10-27 NOTE — PROGRESS NOTES
[facility] Note     Patient Name: Pam Couch  Encounter Date: 10/27/22     Have you been cared for by a [place] [id number] Dermatologist in the last 3 years and, if so, which description applies to you? Yes  I have been here within the last 3 years, and my medical history has NOT changed since that time  I am MALE/not capable of bearing children  REVIEW OF SYSTEMS:  Have you recently had or currently have any of the following? · No changes in my recent health  PAST MEDICAL HISTORY:  Have you personally ever had or currently have any of the following? If "YES," then please provide more detail  · No changes in my medical history  FAMILY HISTORY:  Any "first degree relatives" (parent, brother, sister, or child) with the following? • No changes in my family's known health  PATIENT EXPERIENCE:    • Do you want the Dermatologist to perform a COMPLETE skin exam today including a clinical examination under the "bra and underwear" areas? Yes  • If necessary, do we have your permission to call and leave a detailed message on your Preferred Phone number that includes your specific medical information?   Yes      No Known Allergies   Current Outpatient Medications:   •  Multiple Vitamins-Minerals (MULTIVITAMIN ADULTS PO), Take 1 capsule by mouth daily, Disp: , Rfl:   •  RABEprazole (ACIPHEX) 20 MG tablet, Take 1 tablet (20 mg total) by mouth daily, Disp: 90 tablet, Rfl: 3  •  triamcinolone (KENALOG) 0 025 % cream, Apply topically 2 (two) times a day Apply sparingly 2-4 times a day for up to one week , Disp: 30 g, Rfl: 0          • Whom besides the patient is providing clinical information about today's encounter?   o NO ADDITIONAL HISTORIAN (patient alone provided history)    Physical Exam and Assessment/Plan by Diagnosis:    HISTORY OF MELANOMA    Physical Exam:  • Anatomic Location Affected:  Right lateral upper back  • Morphological Description of Scar:  Well healed scar  • Year Treated: 2017   • TNM Classification: T1a  • Suspected Recurrence: no  • Regional adenopathy: no        Assessment and Plan:  Based on a thorough discussion of this condition and the management approach to it (including a comprehensive discussion of the known risks, side effects and potential benefits of treatment), the patient (family) agrees to implement the following specific plan:    • Will monitor for recurrence  • Recommend a yearly sin exam    What happens at follow-up? The main purpose of follow-up is to detect recurrences early (metastatic melanoma), but it also offers an opportunity to diagnose a new primary melanoma at the first possible opportunity  A second invasive melanoma occurs in 5-10% of melanoma patients and a new melanoma in situ is diagnosed in more than 20% of melanoma patients  Our practice makes the following recommendations for follow-up for patients with invasive melanoma  • At-least "monthly" self-skin examinations   • Routine skin checks by a board certified dermatologist  • Follow-up intervals are "every 3 months" within 2 years of a new melanoma diagnosis; "every 6 months" between 2-4 years of a new melanoma diagnosis; and "annually" after 4 years of a new melanoma diagnosis  • Individual patient's needs should be considered before an appropriate follow-up is offered  • Provide education and support to help the patient adjust to their illness    Follow-up appointments should include:  • A check of the scar where the primary melanoma was removed  • Checking the regional lymph nodes  • A general skin examination  • A full physical examination at least annually by your primary care physician    In those with more advanced primary disease, follow-up may include:  • Blood tests  • Imaging: ultrasound, X-ray, CT, MRI and PET scan  Most tests are not worthwhile for patients with stage 1 or 2 melanoma unless there are signs or symptoms of disease recurrence or metastasis   No tests are necessary for healthy patients who have remained well for five years or longer after removal of their melanoma  What is the outlook for patients with melanoma? • Melanoma in situ is cured by excision because it has no potential to spread around the body  • The risk of spread and ultimate death from invasive melanoma depends on several factors, but the main one is the Breslow thickness of the melanoma at the time it was surgically removed  • Metastases are rare for melanomas < 0 75 mm and the risk for tumours 0 75-1 mm thick is about 5%  The risk steadily increases with thickness so that melanomas > 4 mm have a risk of metastasis of about 40%  Melanoma is a potentially serious type of skin cancer, in which there is uncontrolled growth of melanocytes (pigment cells)  Melanoma is sometimes called malignant melanoma  Normal melanocytes are found in the basal layer of the epidermis (the outer layer of skin)  Melanocytes produce a protein called melanin, which protects skin cells by absorbing ultraviolet (UV) radiation  Melanocytes are found in equal numbers in black and white skin, but melanocytes in black skin produce much more melanin  People with dark brown or black skin are very much less likely to be damaged by UV radiation than those with white skin  HISTORY OF BASAL CELL CARCINOMA    Physical Exam:  • Anatomic Location Affected:  Right inferior medial back  • Morphological Description of scar:  Well healed scar  • Suspected Recurrence: No  • Pertinent Positives:  • Pertinent Negatives: Additional History of Present Condition:  History of basal cell carcinoma with no sign of recurrence    Removed in June of 2020     Assessment and Plan:  Based on a thorough discussion of this condition and the management approach to it (including a comprehensive discussion of the known risks, side effects and potential benefits of treatment), the patient (family) agrees to implement the following specific plan:    • Will monitor for recurrence    How can basal cell carcinoma be prevented? The most important way to prevent BCC is to avoid sunburn  This is especially important in childhood and early life  Fair skinned individuals and those with a personal or family history of BCC should protect their skin from sun exposure daily, year-round and lifelong  • Stay indoors or under the shade in the middle of the day   • Wear covering clothing   • Apply high protection factor SPF50+ broad-spectrum sunscreens generously to exposed skin if outdoors   • Avoid indoor tanning (sun beds, solaria)  • Oral nicotinamide (vitamin B3) in a dose of 500 mg twice daily may reduce the number and severity of BCCs  What is the outlook for basal cell carcinoma? Most BCCs are cured by treatment  Cure is most likely if treatment is undertaken when the lesion is small  About 50% of people with BCC develop a second one within 3 years of the first  They are also at increased risk of other skin cancers, especially melanoma  Regular self-skin examinations and long-term annual skin checks by an experienced health professional are recommended  MELANOCYTIC NEVI ("Moles")    Physical Exam:  • Anatomic Location Affected:   Diffuse on body  • Morphological Description:  Scattered, 1-4mm round to ovoid, symmetrical-appearing, even bordered, skin colored to dark brown macules/papules, mostly in sun-exposed areas  • Pertinent Positives:  • Pertinent Negatives:        Assessment and Plan:  Based on a thorough discussion of this condition and the management approach to it (including a comprehensive discussion of the known risks, side effects and potential benefits of treatment), the patient (family) agrees to implement the following specific plan:    • Will monitor for changes     Melanocytic Nevi  Melanocytic nevi ("moles") are caused by collections of the color producing skin cells, or melanocytes, in 1 area in the skin   They can range in color from pink to dark brown and be either raised or flat  Some moles are present at birth (I e , "congenital nevi"), while others come up later in life (i e , "acquired nevi")  Yessenia Tenriism exposure also stimulates the body to make more moles, ie the more sun you get the more moles you'll grow  Clinically distinguishing a healthy mole from melanoma may be difficult  The "ABCDE's" of moles have been suggested as a means of helping to alert a person to a suspicious mole and the possible increased risk of melanoma  Asymmetry: Healthy moles tend to be symmetric, while melanomas are often asymmetric  Asymmetry means if you draw a line through the mole, the two halves do not match in color, size, shape, or surface texture Any mole that starts to demonstrate "asymmetry" should be examined promptly by a board certified dermatologist      Border: Healthy moles tend to have discrete, even borders  The border of a melanoma often blends into the normal skin and does not sharply delineate the mole from normal skin  Any mole that starts to demonstrate "uneven borders" should be examined promptly     Color: Healthy moles tend to be one color throughout  Melanomas tend to be made up of different colors ranging from dark black, blue, white, or red  Any mole that demonstrates a color change should be examined promptly    Diameter: Healthy moles tend to be smaller than 0 6 cm in size; an exception are "congenital nevi" that can be larger  Melanomas tend to grow and can often be greater than 0 6 cm (1/4 of an inch, or the size of a pencil eraser)  This is only a guideline, and many normal moles may be larger than 0 6 cm without being unhealthy  Any mole that starts to change in size (small to bigger or bigger to smaller) should be examined promptly    Evolving: Healthy moles tend to "stay the same "  Melanomas may often show signs of change or evolution such as a change in size, shape, color, or elevation    Any mole that starts to itch, bleed, crust, burn, hurt, or ulcerate or demonstrate a change or evolution should be examined promptly by a board certified dermatologist       What are atypical moles or dysplastic nevi? Dysplastic moles are moles that have some of the ABCDE  changes listed above but  are not cancerous  Sometimes a biopsy and microscopic examination are needed to determine the difference  They may indicate an increased risk of melanoma in that person, especially if there is a family history of melanoma  What is a Melanoma? The main concern when looking at a new or changing mole it to evaluate whether it may be a melanoma  The appearance of a "new mole" remains one of the most reliable methods for identifying a malignant melanoma  A melanoma is a type of skin cancer that can be deadly if it spreads throughout the body  The prognosis of a Melanoma depends on how deep it has penetrated in the skin  If caught early, they generally will not have had time to grow into the deeper layers of the skin and they cure rate is then very high  Once the melanoma grows deeper into the skin, the cure rate drops dramatically  Therefore, early detection and removal of a malignant melanoma results in a much better chance of complete cure  ADAMS ANGIOMAS    Physical Exam:  • Anatomic Location Affected:  trunk  • Morphological Description:  Scattered cherry red, 1-4 mm papules  • Pertinent Positives:  • Pertinent Negatives:        Assessment and Plan:  Based on a thorough discussion of this condition and the management approach to it (including a comprehensive discussion of the known risks, side effects and potential benefits of treatment), the patient (family) agrees to implement the following specific plan:    • Reassured benign    Assessment and Plan:    Cherry angioma, also known as Dom [place] spots, are benign vascular skin lesions  A "cherry angioma" is a firm red, blue or purple papule, 0 1-1 cm in diameter   When thrombosed, they can appear black in colour until evaluated with a dermatoscope when the red or purple colour is more easily seen  Cherry angioma may develop on any part of the body but most often appear on the scalp, face, lips and trunk  An angioma is due to proliferating endothelial cells; these are the cells that line the inside of a blood vessel  Angiomas can arise in early life or later in life; the most common type of angioma is a cherry angioma  Cherry angiomas are very common in males and females of any age or race  They are more noticeable in white skin than in skin of colour  They markedly increase in number from about the age of 36  There may be a family history of similar lesions  Eruptive cherry angiomas have been rarely reported to be associated with internal malignancy  The cause of angiomas is unknown  Genetic analysis of cherry angiomas has shown that they frequently carry specific somatic missense mutations in the GNAQ and GNA11 (Q209H) genes, which are involved in other vascular and melanocytic proliferations  Cherry angioma is usually diagnosed clinically and no investigations are necessary for the majority of lesions  It has a characteristic red-clod or lobular pattern on dermatoscopy (called lacunar pattern using conventional pattern analysis)  When there is uncertainty about the diagnosis, a biopsy may be performed  The angioma is composed of venules in a thickened papillary dermis  Collagen bundles may be prominent between the lobules  Cherry angiomas are harmless, so they do not usually have to be treated  Occasionally, they are removed to exclude a malignant skin lesion such as a nodular melanoma or because they are irritated or bleeding (and a subsequent risk for infection)  To decrease friction over the lesions, we recommend Neutrogena Daily Defense SPF 50+ at least 3 times a day      SEBORRHEIC KERATOSIS; NON-INFLAMED    Physical Exam:  • Anatomic Location Affected:  trunk  • Morphological Description:  Flat and raised, waxy, smooth to warty textured, yellow to brownish-grey to dark brown to blackish, discrete, "stuck-on" appearing papules  • Pertinent Positives:  • Pertinent Negatives: Additional History of Present Condition:  Patient reports new bumps on the skin  Denies itch, burn, pain, bleeding or ulceration  Present constantly; nothing seems to make it worse or better  No prior treatment  Assessment and Plan:  Based on a thorough discussion of this condition and the management approach to it (including a comprehensive discussion of the known risks, side effects and potential benefits of treatment), the patient (family) agrees to implement the following specific plan:    • Reassured benign    Seborrheic Keratosis  A seborrheic keratosis is a harmless warty spot that appears during adult life as a common sign of skin aging  Seborrheic keratoses can arise on any area of skin, covered or uncovered, with the usual exception of the palms and soles  They do not arise from mucous membranes  Seborrheic keratoses can have highly variable appearance  Seborrheic keratoses are extremely common  It has been estimated that over 90% of adults over the age of 61 years have one or more of them  They occur in males and females of all races, typically beginning to erupt in the 35s or 45s  They are uncommon under the age of 21 years  The precise cause of seborrhoeic keratoses is not known  Seborrhoeic keratoses are considered degenerative in nature  As time goes by, seborrheic keratoses tend to become more numerous  Some people inherit a tendency to develop a very large number of them; some people may have hundreds of them  The name "seborrheic keratosis" is misleading, because these lesions are not limited to a seborrhoeic distribution (scalp, mid-face, chest, upper back), nor are they formed from sebaceous glands, nor are they associated with sebum -- which is greasy    Seborrheic keratosis may also be called "SK," "Seb K," "basal cell papilloma," "senile wart," or "barnacle "      Researchers have noted:  • Eruptive seborrhoeic keratoses can follow sunburn or dermatitis  • Skin friction may be the reason they appear in body folds  • Viral cause (e g , human papillomavirus) seems unlikely  • Stable and clonal mutations or activation of FRFR3, PIK3CA, YARELY, AKT1 and EGFR genes are found in seborrhoeic keratoses  • Seborrhoeic keratosis can arise from solar lentigo  • FRFR3 mutations also arise in solar lentigines  These mutations are associated with increased age and location on the head and neck, suggesting a role of ultraviolet radiation in these lesions  • Seborrheic keratoses do not harbour tumour suppressor gene mutations  • Epidermal growth factor receptor inhibitors, which are used to treat some cancers, often result in an increase in verrucal (warty) keratoses  There is no easy way to remove multiple lesions on a single occasion  Unless a specific lesion is "inflamed" and is causing pain or stinging/burning or is bleeding, most insurance companies do not authorize treatment  TINEA PEDIS ("ATHLETE'S FOOT")    Physical Exam:  • Anatomic Location Affected:  Bilateral feet (plantar),between toes  • Morphological Description:  scaling patches  • Pertinent Positives:  • Pertinent Negatives:        Assessment and Plan:  Based on a thorough discussion of this condition and the management approach to it (including a comprehensive discussion of the known risks, side effects and potential benefits of treatment), the patient (family) agrees to implement the following specific plan:    Ketoconazole 2% cream 2x/day to bottom of feet, between toes    Tinea Pedis  Tinea pedis is a fungal infection of the foot and is in fact the most common fungal infection   Tinea pedis is caused by dermatophyte fungi with the three most common being Trichophyton (T ) rubrum, T  interdigitale and Epidermophyton floccosum  Tinea pedis most commonly involves the interdigital spaces, known as "athlete's foot " Other typical sites include the toenails, groin, and palms of the hands  There are four major manifestations of tinea pedis including chronic hyperkeratotic, chronic intertriginous, acute ulcerative and vesicobullous  Signs and symptoms include:   • Itchiness, redness, and scaling between the toes  • Scales covering the soles and sides of the feet  • Blisters over the inner aspect of the feet    It is particularly common in hot, tropical, and urban environments where sweating in the feet facilitate fungal growth  Risk factors for development include:  • Occlusive footwear  • Excessive swearing  • Diabetes or other underlying immunosuppression   • Poor peripheral circulation     The diagnosis of tinea pedis can usually be made via good history and physical exam due to its characteristic clinical features  Diagnosis can be confirmed by examining skin scrapings under the microscope  Cultures are occasionally done but may not be necessary if fungi are seen under microscopy  Other diagnoses to consider if patients do not respond to therapy include psoriasis, contact dermatitis, and eczema  Tinea pedis can be treated with topical antifungal drugs applied to affected areas on a repeated basis (usually 2 twice a day) for 2 to 4 weeks  Common topical medications include topical ketoconazole, allylamines, butenafine, ciclopirox, and tolnaftate  In cases that do not respond to topical therapy, oral antifungal agents may be used which include terbinafine, itraconazole, fluconazole and griseofulvin  These oral agents are also used to treat tinea capitis (fungal infection of the scalp) and onychomycosis (fungal infection of the nails)  Those with pre-existing liver problems are usually screened for liver function prior to starting oral terbinafine       Tinea pedis can be prevented by making sure feet are clean and dry with protective footwear worn in communal facilities  Other recommendations are:  • Using drying foot powders when wearing occlusive shoes   • Thoroughly dry shoes and boots prior to wearing them   • Making sure to clean contaminated bathroom floors with bleach   • Treatment of family members and other close contacts    SEBORRHEIC DERMATITIS    Physical Exam:  • Anatomic Location Affected:  Central face   • Morphological Description:  Clear today  • Pertinent Positives:  • Pertinent Negatives:        Assessment and Plan:  Based on a thorough discussion of this condition and the management approach to it (including a comprehensive discussion of the known risks, side effects and potential benefits of treatment), the patient (family) agrees to implement the following specific plan:    • Stop triamcinolone 0 025% cream   • Apply ketoconazole 2% cream once daily as needed      Seborrheic Dermatitis   Seborrheic dermatitis is a common, chronic or relapsing form of eczema/dermatitis that mainly affects the sebaceous, gland-rich regions of the scalp, face, and trunk  There are infantile and adult forms of seborrhoeic dermatitis  It is sometimes associated with psoriasis and, in that clinical scenario, may be referred to as "sebo-psoriasis "  Seborrheic dermatitis is also known as "seborrheic eczema "  Dandruff (also called "pityriasis capitis") is an uninflamed form of seborrhoeic dermatitis  Dandruff presents as bran-like scaly patches scattered within hair-bearing areas of the scalp  In an infant, this condition may be referred to as "cradle cap "  The cause of seborrheic dermatitis is not completely understood  It is associated with proliferation of various species of the skin commensal Malassezia, in its yeast (non-pathogenic) form  Its metabolites (such as the fatty acids oleic acid, malssezin, and indole-3-carbaldehyde) may cause an inflammatory reaction   Differences in skin barrier lipid content and function may account for individual presentations  Infantile Seborrheic Dermatitis  Infantile seborrheic dermatitis affects babies under the age of 1 months and usually resolves by 1012 months of age  Infantile seborrheic dermatitis causes "cradle cap" (diffuse, greasy scaling on scalp)  The rash may spread to affect armpit and groin folds (a type of "napkin dermatitis")  There may be associated salmon-pink colored patches that may flake or peel  The rash in this case is usually not especially itchy, so the baby often appears undisturbed by the rash, even when more generalized  Adult Seborrheic Dermatitis  Adult seborrheic dermatitis tends to begin in late adolescence; prevalence is greatest in young adults and in the elderly  It is more common in males than in females  The following factors are sometimes associated with severe adult seborrheic dermatitis:  • Oily skin  • Familial tendency to seborrhoeic dermatitis or a family history of psoriasis  • Immunosuppression: organ transplant recipient, human immunodeficiency virus (HIV) infection and patients with lymphoma  • Neurological and psychiatric diseases: Parkinson disease, tardive dyskinesia, depression, epilepsy, facial nerve palsy, spinal cord injury and congenital disorders such as Down syndrome  • Treatment for psoriasis with psoralen and ultraviolet A (PUVA) therapy  • Lack of sleep  • Stressful events  In adults, seborrheic dermatitis may typically affect the scalp, face (creases around the nose, behind ears, within eyebrows) and upper trunk   Typical clinical features include:  • Winter flares, improving in summer following sun exposure  • Minimal itch most of the time  • Combination oily and dry mid-facial skin  • Ill-defined localized scaly patches or diffuse scale in the scalp  • Blepharitis; scaly red eyelid margins  • [place]-pink, thin, scaly, and ill-defined plaques in skin folds on both sides of the face  • Petal or ring-shaped flaky patches on hair-line and on anterior chest  • Rash in armpits, under the breasts, in the groin folds and genital creases  • Superficial folliculitis (inflamed hair follicles) on cheeks and upper trunk    Seborrheic dermatitis is diagnosed by its clinical appearance and behavior  Skin biopsy may be helpful but is rarely necessary to make this diagnosis      Scribe Attestation    I,:  Elisha Durand MA am acting as a scribe while in the presence of the attending physician :       I,:  Pennie Urbano MD personally performed the services described in this documentation    as scribed in my presence :

## 2023-01-14 ENCOUNTER — OFFICE VISIT (OUTPATIENT)
Dept: URGENT CARE | Facility: CLINIC | Age: 58
End: 2023-01-14

## 2023-01-14 VITALS
DIASTOLIC BLOOD PRESSURE: 97 MMHG | RESPIRATION RATE: 16 BRPM | SYSTOLIC BLOOD PRESSURE: 160 MMHG | OXYGEN SATURATION: 97 % | TEMPERATURE: 97.7 F | HEART RATE: 73 BPM

## 2023-01-14 DIAGNOSIS — J06.9 UPPER RESPIRATORY TRACT INFECTION, UNSPECIFIED TYPE: Primary | ICD-10-CM

## 2023-01-14 DIAGNOSIS — R05.1 ACUTE COUGH: ICD-10-CM

## 2023-01-14 LAB
SARS-COV-2 AG UPPER RESP QL IA: NEGATIVE
VALID CONTROL: NORMAL

## 2023-01-14 RX ORDER — ALBUTEROL SULFATE 90 UG/1
2 AEROSOL, METERED RESPIRATORY (INHALATION) EVERY 4 HOURS PRN
Qty: 18 G | Refills: 0 | Status: SHIPPED | OUTPATIENT
Start: 2023-01-14

## 2023-01-14 RX ORDER — BENZONATATE 200 MG/1
200 CAPSULE ORAL 3 TIMES DAILY PRN
Qty: 30 CAPSULE | Refills: 0 | Status: SHIPPED | OUTPATIENT
Start: 2023-01-14 | End: 2023-01-24

## 2023-01-14 RX ORDER — METHYLPREDNISOLONE 4 MG/1
TABLET ORAL
Qty: 21 TABLET | Refills: 0 | Status: SHIPPED | OUTPATIENT
Start: 2023-01-14

## 2023-01-14 NOTE — PROGRESS NOTES
[address] [employer]        NAME: Leah Montoya is a 62 y o  male  : 1965    MRN: [id number]  DATE: 2023  TIME: 10:10 AM    Assessment and Plan   Upper respiratory tract infection, unspecified type [J06 9]  1  Upper respiratory tract infection, unspecified type  methylPREDNISolone 4 MG tablet therapy pack    albuterol (Ventolin HFA) 90 mcg/act inhaler    benzonatate (TESSALON) 200 MG capsule      2  Acute cough  Poct Covid 19 Rapid Antigen Test            Patient Instructions       Follow up with PCP in 3-5 days  Proceed to  ER if symptoms worsen  Chief Complaint     Chief Complaint   Patient presents with   • Cough     Pt reports cough, nasal congestion, and phlegm for 3-4 days  No home COVID tests  History of Present Illness       54-year-old male with 3-day history of cough, congestion and sore throat  Denies any fevers or chills  Review of Systems   Review of Systems   Constitutional: Negative  HENT: Positive for congestion  Eyes: Negative  Respiratory: Positive for cough  Cardiovascular: Negative  Gastrointestinal: Negative  Genitourinary: Negative  Musculoskeletal: Positive for arthralgias and myalgias  Skin: Negative  Allergic/Immunologic: Negative  Neurological: Negative  Hematological: Negative  Psychiatric/Behavioral: Negative  Current Medications       Current Outpatient Medications:   •  albuterol (Ventolin HFA) 90 mcg/act inhaler, Inhale 2 puffs every 4 (four) hours as needed for wheezing or shortness of breath, Disp: 18 g, Rfl: 0  •  benzonatate (TESSALON) 200 MG capsule, Take 1 capsule (200 mg total) by mouth 3 (three) times a day as needed for cough for up to 10 days, Disp: 30 capsule, Rfl: 0  •  methylPREDNISolone 4 MG tablet therapy pack, Use as directed on package, Disp: 21 tablet, Rfl: 0  •  ketoconazole (NIZORAL) 2 % cream, Apply 2x/day to bottom of feet and in between toes for 3 weeks   Apply on facial redness/rash as needed  , Disp: 60 g, Rfl: 5  •  Multiple Vitamins-Minerals (MULTIVITAMIN ADULTS PO), Take 1 capsule by mouth daily, Disp: , Rfl:   •  RABEprazole (ACIPHEX) 20 MG tablet, Take 1 tablet (20 mg total) by mouth daily, Disp: 90 tablet, Rfl: 3  •  triamcinolone (KENALOG) 0 025 % cream, Apply topically 2 (two) times a day Apply sparingly 2-4 times a day for up to one week , Disp: 30 g, Rfl: 0    Current Allergies     Allergies as of 01/14/2023   • (No Known Allergies)            The following portions of the patient's history were reviewed and updated as appropriate: allergies, current medications, past family history, past medical history, past social history, past surgical history and problem list      Past Medical History:   Diagnosis Date   • Colon polyp    • COVID-19 12/2020   • GERD (gastroesophageal reflux disease)    • Melanoma ([facility] [address] ) 2017    Dermatology and Moh's surgery in [facility]; Right lateral upper back   • Testicular cancer [facility]) 2000    Dr Leeanne Richard and [facility] (urology)       Past Surgical History:   Procedure Laterality Date   • COLONOSCOPY      January 2021 small cecal serrated adenoma, sigmoid diverticulosis, internal hemorrhoids   • ORCHIECTOMY Right    • SKIN CANCER EXCISION     • TUMOR EXCISION      right ear as a child   • UPPER GASTROINTESTINAL ENDOSCOPY      January 2021: Schatzki ring biopsies negative for Johnson's or EOE, fundic gland polyps, mild gastritis negative for H pylori  September 2014 Schatzki ring biopsies negative for Johnson's EOE or H pylori  Family History   Problem Relation Age of Onset   • Diabetes Mother    • Hypertension Mother    • Hypertension Father    • Heart disease Father    • No Known Problems Sister    • No Known Problems Brother    • No Known Problems Brother    • Colon polyps Neg Hx    • Colon cancer Neg Hx          Medications have been verified          Objective   /97   Pulse 73   Temp 97 7 °F (36 5 °C)   Resp 16   SpO2 97%   No LMP for male patient  Physical Exam     Physical Exam  Constitutional:       Appearance: He is well-developed  HENT:      Head: Normocephalic  Nose: Congestion present  Eyes:      Pupils: Pupils are equal, round, and reactive to light  Cardiovascular:      Rate and Rhythm: Normal rate  Pulmonary:      Effort: Pulmonary effort is normal    Musculoskeletal:         General: Normal range of motion  Cervical back: Normal range of motion  Skin:     General: Skin is warm  Neurological:      Mental Status: He is alert and oriented to person, place, and time

## 2023-01-16 ENCOUNTER — OFFICE VISIT (OUTPATIENT)
Dept: GASTROENTEROLOGY | Facility: CLINIC | Age: 58
End: 2023-01-16

## 2023-01-16 VITALS
HEIGHT: 69 IN | BODY MASS INDEX: 39.66 KG/M2 | SYSTOLIC BLOOD PRESSURE: 142 MMHG | WEIGHT: 267.8 LBS | DIASTOLIC BLOOD PRESSURE: 88 MMHG

## 2023-01-16 DIAGNOSIS — R13.10 DYSPHAGIA, UNSPECIFIED TYPE: ICD-10-CM

## 2023-01-16 DIAGNOSIS — Z86.010 HISTORY OF COLON POLYPS: ICD-10-CM

## 2023-01-16 DIAGNOSIS — K21.9 GASTROESOPHAGEAL REFLUX DISEASE, UNSPECIFIED WHETHER ESOPHAGITIS PRESENT: Primary | ICD-10-CM

## 2023-01-16 DIAGNOSIS — K62.5 RECTAL BLEEDING: ICD-10-CM

## 2023-01-16 DIAGNOSIS — K64.9 HEMORRHOIDS, UNSPECIFIED HEMORRHOID TYPE: ICD-10-CM

## 2023-01-16 RX ORDER — RABEPRAZOLE SODIUM 20 MG/1
20 TABLET, DELAYED RELEASE ORAL DAILY
Qty: 90 TABLET | Refills: 3 | Status: SHIPPED | OUTPATIENT
Start: 2023-01-16

## 2023-01-16 NOTE — PROGRESS NOTES
[facility] - Outpatient Follow-up Note  Ailyn Nina 62 y o  male MRN: [id number]  Encounter: [id number]    ASSESSMENT AND PLAN:      1  Gastroesophageal reflux disease, unspecified whether esophagitis present  - RABEprazole (ACIPHEX) 20 MG tablet; Take 1 tablet (20 mg total) by mouth daily  Dispense: 90 tablet; Refill: 3  2  Dysphagia, unspecified type  Reflux has been clinically stable with dietary management and taking Aciphex daily  He no longer has dysphagia as long as he watches how he eats  Previous endoscopy is negative for Johnson's  Having some increased throat symptoms with his present URI  Not clear this is being exacerbated by reflux  Continue reflux diet and precautions  Cut and chew food well, take time eating, encourage fluids  Continue Aciphex daily    3  Hemorrhoids, unspecified hemorrhoid type  4  Rectal bleeding  History is consistent with hemorrhoidal bleed  No other lesions identified on digital rectal examination  Hemorrhoids documented on colonoscopy 2 years ago  Continue with daily fiber supplementation and plenty of fluids  Okay to use ProctoCream HC as needed if hemorrhoids flare again  Consider hemorrhoid banding if bleeding and symptoms persist    5  History of colon polyps  Up-to-date with polyp surveillance  Recommend surveillance colonoscopy every 5 years  Next colonoscopy due January 2026      Followup Appointment: 1 year or sooner if needed  ______________________________________________________________________    Chief Complaint   Patient presents with   • follow up GERD and hemmorids     Has had rectal bleeding- red blood (about 6 weeks ago)     HPI:  On steroids and inhaler for URI  Had persistent sore throat  Felt throat was crusty  No dysphagia  No heartburn or water brash  Occasional dysphagia to bread and pretzels  About six weeks abo had straining and BRBPR  No rectal pain  Some straining    Was straining when bleeding occurred  Historical Information   Past Medical History:   Diagnosis Date   • Cancer [facility]) 2018   • Colon polyp    • COVID-19 12/2020   • GERD (gastroesophageal reflux disease)    • Melanoma ([address] ) 2017    Dermatology and Moh's surgery in [facility]; Right lateral upper back   • Testicular cancer [facility]) 2000    Dr Tadeo White and [facility] (urology)     Past Surgical History:   Procedure Laterality Date   • COLONOSCOPY      January 2021 small cecal serrated adenoma, sigmoid diverticulosis, internal hemorrhoids   • ORCHIECTOMY Right    • SKIN CANCER EXCISION     • TUMOR EXCISION      right ear as a child   • UPPER GASTROINTESTINAL ENDOSCOPY      January 2021: Schatzki ring biopsies negative for Johnson's or EOE, fundic gland polyps, mild gastritis negative for H pylori  September 2014 Schatzki ring biopsies negative for Johnson's EOE or H pylori       Social History     Substance and Sexual Activity   Alcohol Use Yes   • Alcohol/week: 1 0 standard drink   • Types: 1 Cans of beer per week     Social History     Substance and Sexual Activity   Drug Use Never     Social History     Tobacco Use   Smoking Status Never   Smokeless Tobacco Never     Family History   Problem Relation Age of Onset   • Diabetes Mother    • Hypertension Mother    • Hypertension Father    • Heart disease Father    • No Known Problems Sister    • No Known Problems Brother    • No Known Problems Brother    • Colon polyps Neg Hx    • Colon cancer Neg Hx          Current Outpatient Medications:   •  albuterol (Ventolin HFA) 90 mcg/act inhaler  •  benzonatate (TESSALON) 200 MG capsule  •  ketoconazole (NIZORAL) 2 % cream  •  methylPREDNISolone 4 MG tablet therapy pack  •  Multiple Vitamins-Minerals (MULTIVITAMIN ADULTS PO)  •  RABEprazole (ACIPHEX) 20 MG tablet  •  triamcinolone (KENALOG) 0 025 % cream  No Known Allergies  Reviewed medications and allergies and updated as indicated    PHYSICAL EXAM:    Blood pressure 142/88, height 5' 9" (1 753 m), weight 121 kg (267 lb 12 8 oz)  Body mass index is 39 55 kg/m²  General Appearance: NAD, cooperative, alert  Eyes: Anicteric, PERRLA, EOMI  ENT:  Normocephalic, atraumatic, normal mucosa  Neck:  Supple, symmetrical, trachea midline  Resp:  Clear to auscultation bilaterally; no rales, rhonchi or wheezing; respirations unlabored   CV:  S1 S2, Regular rate and rhythm; no murmur, rub, or gallop  GI:  Soft, non-tender, non-distended; normal bowel sounds; no masses, no organomegaly   Rectal: No external lesions  Small hemorrhoids palpable on digital exam, no masses appreciated, no blood  Musculoskeletal: No cyanosis, clubbing or edema  Normal ROM  Skin:  No jaundice, rashes, or lesions   Heme/Lymph: No palpable cervical lymphadenopathy  Psych: Normal affect, good eye contact  Neuro: No gross deficits, AAOx3    Lab Results:   Lab Results   Component Value Date    WBC 4 94 07/18/2022    HGB 14 2 07/18/2022    HCT 41 7 07/18/2022    MCV 87 07/18/2022     07/18/2022     Lab Results   Component Value Date    K 4 3 12/17/2021     12/17/2021    CO2 29 12/17/2021    BUN 14 12/17/2021    CREATININE 0 96 12/17/2021    GLUF 97 12/17/2021    CALCIUM 9 0 12/17/2021    AST 19 12/17/2021    ALT 30 12/17/2021    ALKPHOS 56 12/17/2021    EGFR 88 12/17/2021     No results found for: IRON, TIBC, FERRITIN  No results found for: LIPASE    Radiology Results:   No results found  Answers for HPI/ROS submitted by the patient on 1/9/2023  Progression since onset: unchanged  Pain - numeric: 0/10  anorexia: No  arthralgias: No  belching: No  constipation: No  diarrhea: No  dysuria: No  fever: No  flatus: No  frequency: No  headaches: No  hematochezia: No  hematuria: No  melena: No  myalgias: No  nausea:  No  weight loss: No  vomiting: No  Aggravated by: nothing  Relieved by: nothing  Diagnostic workup: CT scan

## 2023-01-16 NOTE — LETTER
January 16, 2023     Petey Uribe DO  [address]  [facility] [address]    Patient: Carlyle Vivas   YOB: 1965   Date of Visit: 1/16/2023       Dear Dr Sylvia Bass:    Thank you for referring Varghese Gonzales to me for evaluation  Below are my notes for this consultation  If you have questions, please do not hesitate to call me  I look forward to following your patient along with you  Sincerely,        Lily Alberto MD        CC: No Recipients  Lily Alberto MD  1/16/2023  5:28 PM  Sign when Signing Visit  [facility] - Outpatient Follow-up Note  Carlyle Vivas 62 y o  male MRN: [id number]  Encounter: [id number]    ASSESSMENT AND PLAN:      1  Gastroesophageal reflux disease, unspecified whether esophagitis present  - RABEprazole (ACIPHEX) 20 MG tablet; Take 1 tablet (20 mg total) by mouth daily  Dispense: 90 tablet; Refill: 3  2  Dysphagia, unspecified type  Reflux has been clinically stable with dietary management and taking Aciphex daily  He no longer has dysphagia as long as he watches how he eats  Previous endoscopy is negative for Johnson's  Having some increased throat symptoms with his present URI  Not clear this is being exacerbated by reflux  · Continue reflux diet and precautions  · Cut and chew food well, take time eating, encourage fluids  · Continue Aciphex daily    3  Hemorrhoids, unspecified hemorrhoid type  4  Rectal bleeding  History is consistent with hemorrhoidal bleed  No other lesions identified on digital rectal examination  Hemorrhoids documented on colonoscopy 2 years ago  · Continue with daily fiber supplementation and plenty of fluids  · Okay to use ProctoCream HC as needed if hemorrhoids flare again  · Consider hemorrhoid banding if bleeding and symptoms persist    5  History of colon polyps  Up-to-date with polyp surveillance    Recommend surveillance colonoscopy every 5 years  · Next colonoscopy due January 2026      Followup Appointment: 1 year or sooner if needed  ______________________________________________________________________    Chief Complaint   Patient presents with   • follow up GERD and hemmorids     Has had rectal bleeding- red blood (about 6 weeks ago)     HPI:  On steroids and inhaler for URI  Had persistent sore throat  Felt throat was crusty  No dysphagia  No heartburn or water brash  Occasional dysphagia to bread and pretzels  About six weeks abo had straining and BRBPR  No rectal pain  Some straining  Was straining when bleeding occurred  Historical Information   Past Medical History:   Diagnosis Date   • Cancer [facility]) 2018   • Colon polyp    • COVID-19 12/2020   • GERD (gastroesophageal reflux disease)    • Melanoma ([facility] [address] ) 2017    Dermatology and Moh's surgery in [facility]; Right lateral upper back   • Testicular cancer [facility]) 2000    Dr Manuela Light and [facility] (urology)     Past Surgical History:   Procedure Laterality Date   • COLONOSCOPY      January 2021 small cecal serrated adenoma, sigmoid diverticulosis, internal hemorrhoids   • ORCHIECTOMY Right    • SKIN CANCER EXCISION     • TUMOR EXCISION      right ear as a child   • UPPER GASTROINTESTINAL ENDOSCOPY      January 2021: Schatzki ring biopsies negative for Johnson's or EOE, fundic gland polyps, mild gastritis negative for H pylori  September 2014 Schatzki ring biopsies negative for Johnson's EOE or H pylori       Social History     Substance and Sexual Activity   Alcohol Use Yes   • Alcohol/week: 1 0 standard drink   • Types: 1 Cans of beer per week     Social History     Substance and Sexual Activity   Drug Use Never     Social History     Tobacco Use   Smoking Status Never   Smokeless Tobacco Never     Family History   Problem Relation Age of Onset   • Diabetes Mother    • Hypertension Mother    • Hypertension Father    • Heart disease Father    • No Known Problems Sister    • No Known Problems Brother    • No Known Problems Brother    • Colon polyps Neg Hx    • Colon cancer Neg Hx          Current Outpatient Medications:   •  albuterol (Ventolin HFA) 90 mcg/act inhaler  •  benzonatate (TESSALON) 200 MG capsule  •  ketoconazole (NIZORAL) 2 % cream  •  methylPREDNISolone 4 MG tablet therapy pack  •  Multiple Vitamins-Minerals (MULTIVITAMIN ADULTS PO)  •  RABEprazole (ACIPHEX) 20 MG tablet  •  triamcinolone (KENALOG) 0 025 % cream  No Known Allergies  Reviewed medications and allergies and updated as indicated    PHYSICAL EXAM:    Blood pressure 142/88, height 5' 9" (1 753 m), weight 121 kg (267 lb 12 8 oz)  Body mass index is 39 55 kg/m²  General Appearance: NAD, cooperative, alert  Eyes: Anicteric, PERRLA, EOMI  ENT:  Normocephalic, atraumatic, normal mucosa  Neck:  Supple, symmetrical, trachea midline  Resp:  Clear to auscultation bilaterally; no rales, rhonchi or wheezing; respirations unlabored   CV:  S1 S2, Regular rate and rhythm; no murmur, rub, or gallop  GI:  Soft, non-tender, non-distended; normal bowel sounds; no masses, no organomegaly   Rectal: No external lesions  Small hemorrhoids palpable on digital exam, no masses appreciated, no blood  Musculoskeletal: No cyanosis, clubbing or edema  Normal ROM    Skin:  No jaundice, rashes, or lesions   Heme/Lymph: No palpable cervical lymphadenopathy  Psych: Normal affect, good eye contact  Neuro: No gross deficits, AAOx3    Lab Results:   Lab Results   Component Value Date    WBC 4 94 07/18/2022    HGB 14 2 07/18/2022    HCT 41 7 07/18/2022    MCV 87 07/18/2022     07/18/2022     Lab Results   Component Value Date    K 4 3 12/17/2021     12/17/2021    CO2 29 12/17/2021    BUN 14 12/17/2021    CREATININE 0 96 12/17/2021    GLUF 97 12/17/2021    CALCIUM 9 0 12/17/2021    AST 19 12/17/2021    ALT 30 12/17/2021    ALKPHOS 56 12/17/2021    EGFR 88 12/17/2021     No results found for: IRON, TIBC, FERRITIN  No results found for: LIPASE    Radiology Results:   No results found  Answers for HPI/ROS submitted by the patient on 1/9/2023  Progression since onset: unchanged  Pain - numeric: 0/10  anorexia: No  arthralgias: No  belching: No  constipation: No  diarrhea: No  dysuria: No  fever: No  flatus: No  frequency: No  headaches: No  hematochezia: No  hematuria: No  melena: No  myalgias: No  nausea:  No  weight loss: No  vomiting: No  Aggravated by: nothing  Relieved by: nothing  Diagnostic workup: CT scan

## 2023-01-16 NOTE — PATIENT INSTRUCTIONS
Continue with reflux diet and precautions  Cut and chew food well, take time eating  Encourage fluids  Continue Aciphex daily  Resume taking fiber supplement daily  Okay to use topicals ProctoCream as needed if hemorrhoids flareup  Call if hemorrhoidal bleeding becomes more frequent and significant

## 2023-01-19 ENCOUNTER — HOSPITAL ENCOUNTER (OUTPATIENT)
Dept: RADIOLOGY | Facility: HOSPITAL | Age: 58
Discharge: HOME | End: 2023-01-19

## 2023-01-19 ENCOUNTER — OFFICE VISIT (OUTPATIENT)
Dept: FAMILY MEDICINE CLINIC | Facility: CLINIC | Age: 58
End: 2023-01-19

## 2023-01-19 VITALS
HEIGHT: 69 IN | OXYGEN SATURATION: 96 % | HEART RATE: 115 BPM | BODY MASS INDEX: 38.69 KG/M2 | WEIGHT: 261.2 LBS | SYSTOLIC BLOOD PRESSURE: 138 MMHG | DIASTOLIC BLOOD PRESSURE: 82 MMHG | TEMPERATURE: 97.8 F

## 2023-01-19 DIAGNOSIS — R05.1 ACUTE COUGH: ICD-10-CM

## 2023-01-19 DIAGNOSIS — R05.1 ACUTE COUGH: Primary | ICD-10-CM

## 2023-01-19 DIAGNOSIS — R03.0 ELEVATED BLOOD-PRESSURE READING WITHOUT DIAGNOSIS OF HYPERTENSION: ICD-10-CM

## 2023-01-19 RX ORDER — DOXYCYCLINE HYCLATE 100 MG/1
100 CAPSULE ORAL EVERY 12 HOURS SCHEDULED
Qty: 14 CAPSULE | Refills: 0 | Status: SHIPPED | OUTPATIENT
Start: 2023-01-19 | End: 2023-01-26

## 2023-01-19 NOTE — PROGRESS NOTES
Name: Marla Plascencia      : 1965      MRN: [id number]  Encounter Provider: Natali Ashraf DO  Encounter Date: 2023   Encounter department: [address] [place] [address]  Acute cough  -     XR chest pa & lateral; Future; Expected date: 2023  Cough x 10 days  covid antigen test negative in urgent care  Has some rales in left base and tachycardia  ? LLL pneumonia  Will check CXR   Start doxycycline empirically  mucinex DM for cough  Fluids, rest    2  Elevated blood-pressure reading without diagnosis of hypertension  bp elevated in urgent care at 160/97  Better today  Recommend he follow DASH diet       Subjective     HPI   Patient is a 62year old male with GERD, history of testicular CA and history of melanoma who is being seen today for complaint of a persistent cough  He is accompanied to today's visit by his wife  He was seen in urgent care on 23 for complaint of 3-4 days of "cough, nasal congestion and phlegm"  The Covid antigen test in the urgent care was negative  He was treated with medrol dose pack, albuterol inhaler and tessalon perles  Will be completing the steroid tomorrow  Does not feel like the tessalon perles really do much  His cough persists  Cough is productive  Has some associated shortness of breath today  No wheezing at all  No fever or chills  Some nasal congestion and rhinorrhea  No post nasal drip that he has noticed  No sinus pressure  No headaches  Cough is not keeping him awake at night  No prior history of underlying lung disease such as asthma or copd    Wife had similar sx prior to patient  Her sx have improved  Wife tells me that he has had 3 different respiratory infections in last few months  Initially had some sx around thanksgiving which resolved then again around Outing and again in January         Review of Systems    Past Medical History:   Diagnosis Date   • Cancer ([facility] [address] ) 2018   • Colon polyp    • COVID-19 12/2020   • GERD (gastroesophageal reflux disease)    • Melanoma ([address] ) 2017    [facility]ermat[facility] and [facility] in [facility]; Right lateral upper back   • Testicular cancer [facility]) 2000    Dr Garima Mueller and [facility] (urology)     Past Surgical History:   Procedure Laterality Date   • COLONOSCOPY      January 2021 small cecal serrated adenoma, sigmoid diverticulosis, internal hemorrhoids   • ORCHIECTOMY Right    • SKIN CANCER EXCISION     • TUMOR EXCISION      right ear as a child   • UPPER GASTROINTESTINAL ENDOSCOPY      January 2021: Schatzki ring biopsies negative for Johnson's or EOE, fundic gland polyps, mild gastritis negative for H pylori  September 2014 Schatzki ring biopsies negative for Johnson's EOE or H pylori  Family History   Problem Relation Age of Onset   • Diabetes Mother    • Hypertension Mother    • Hypertension Father    • Heart disease Father    • No Known Problems Sister    • No Known Problems Brother    • No Known Problems Brother    • Colon polyps Neg Hx    • Colon cancer Neg Hx      Social History     Socioeconomic History   • Marital status: /Civil Union     Spouse name: None   • Number of children: None   • Years of education: None   • Highest education level: None   Occupational History   • None   Tobacco Use   • Smoking status: Never   • Smokeless tobacco: Never   Vaping Use   • Vaping Use: Never used   Substance and Sexual Activity   • Alcohol use:  Yes     Alcohol/week: 1 0 standard drink     Types: 1 Cans of beer per week   • Drug use: Never   • Sexual activity: Yes     Partners: Female     Birth control/protection: None   Other Topics Concern   • None   Social History Narrative    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 [place]        2 children    Likes to [address] Strain: Not on [employer] Insecurity: Not on file   Transportation Needs: Not on file Physical Activity: Not on file   Stress: Not on file   Social Connections: Not on file   Intimate Partner Violence: Not on file   Housing Stability: Not on file     Current Outpatient Medications on File Prior to Visit   Medication Sig   • albuterol (Ventolin HFA) 90 mcg/act inhaler Inhale 2 puffs every 4 (four) hours as needed for wheezing or shortness of breath   • benzonatate (TESSALON) 200 MG capsule Take 1 capsule (200 mg total) by mouth 3 (three) times a day as needed for cough for up to 10 days   • ketoconazole (NIZORAL) 2 % cream Apply 2x/day to bottom of feet and in between toes for 3 weeks  Apply on facial redness/rash as needed  • methylPREDNISolone 4 MG tablet therapy pack Use as directed on package   • Multiple Vitamins-Minerals (MULTIVITAMIN ADULTS PO) Take 1 capsule by mouth daily   • RABEprazole (ACIPHEX) 20 MG tablet Take 1 tablet (20 mg total) by mouth daily   • triamcinolone (KENALOG) 0 025 % cream Apply topically 2 (two) times a day Apply sparingly 2-4 times a day for up to one week  No Known Allergies  Immunization History   Administered Date(s) Administered   • COVID-19 PFIZER VACCINE 0 3 ML IM 03/18/2021, 04/12/2021, 12/06/2021   • Influenza Quadrivalent Recombinant Preservative Free IM 10/28/2021   • Tdap 01/14/2022       Objective     /82 (BP Location: Left arm, Patient Position: Sitting, Cuff Size: Large)   Pulse (!) 115   Temp 97 8 °F (36 6 °C) (Tympanic)   Ht 5' 9" (1 753 m)   Wt 118 kg (261 lb 3 2 oz)   SpO2 96%   BMI 38 57 kg/m²     Physical Exam  Vitals and nursing note reviewed  Constitutional:       General: He is not in acute distress  Appearance: Normal appearance  He is obese  He is not ill-appearing, toxic-appearing or diaphoretic  Comments: Wet cough   HENT:      Head: Normocephalic and atraumatic        Right Ear: Tympanic membrane normal       Left Ear: Tympanic membrane normal       Nose: Nose normal       Mouth/Throat:      Mouth: Mucous membranes are dry  Pharynx: No oropharyngeal exudate or posterior oropharyngeal erythema  Eyes:      Conjunctiva/sclera: Conjunctivae normal    Neck:      Vascular: No carotid bruit  Cardiovascular:      Rate and Rhythm: Normal rate and regular rhythm  Heart sounds: No murmur heard  Pulmonary:      Effort: Pulmonary effort is normal       Breath sounds: Rales (left base) present  Musculoskeletal:      Cervical back: Normal range of motion and neck supple  Right lower leg: No edema  Left lower leg: No edema  Lymphadenopathy:      Cervical: No cervical adenopathy  Skin:     General: Skin is warm and dry  Findings: No rash  Neurological:      General: No focal deficit present  Mental Status: He is alert and oriented to person, place, and time     Psychiatric:         Mood and Affect: Mood normal        Debra Hill DO

## 2023-01-23 DIAGNOSIS — J06.9 VIRAL URI WITH COUGH: Primary | ICD-10-CM

## 2023-01-23 RX ORDER — HYDROCODONE POLISTIREX AND CHLORPHENIRAMINE POLISTIREX 10; 8 MG/5ML; MG/5ML
5 SUSPENSION, EXTENDED RELEASE ORAL EVERY 12 HOURS PRN
Qty: 115 ML | Refills: 0 | Status: SHIPPED | OUTPATIENT
Start: 2023-01-23 | End: 2023-02-07

## 2023-02-06 NOTE — PROGRESS NOTES
Name: Imtiaz Dyer      : 1965      MRN: [id number]  Encounter Provider: Gabriela Govea DO  Encounter Date: 2023   Encounter department: [address] [place] [address]  Persistent cough for 3 weeks or longer  CXR showed no acute cardiopulmonary disease  Discussed causes of chronic cough including post-infectious etiology, reflux, asthma and post nasal drip/sinus   Patient already on aciphex 20 mg daily and feels that reflux is controlled   Will have him try flonase and otc antihistamine such as zyrtec  rx for pulmicort inhaler bid  Recheck 2 weeks  If still with cough, will increase aciphex and consider pulmonology eval/advanced imaging           Subjective     HPI   Patient is a 62year old male with history of GERD, melanoma , testicular CA here today for complaint ongoing cough  Patient was seen here in office on 23 for complaint of a cough that had persisted from the time of an urgent care visit on 23  Patient was treated with medrol dose pack, albuterol inhaler and tessalon perles in urgent care  His covid/flu swab in urgent care were negative  When I saw him on , his cough was productive and he had some associated shortness of breath  There were ? rales left base and patient was treated empirically with doxycycline  His CXR completed later that day showed no acute cardiopulmonary disease  He has completed the antibiotic as prescribed  His cough continues to be productive and is keeping him awake at hs  He was prescribed tussionex to help with this  He thinks this helped him sleep  Some shortness of breath when he "overdoes it"  No wheezes  Not using the albuterol inhaler regularly  Does not feel that it really helps when he does use it  Some nasal congestion and rhinorrhea in AM  No post nasal drip  Denies any reflux         Review of Systems    Past Medical History:   Diagnosis Date   • Cancer [facility]) 2018   • Colon polyp • COVID-19 12/2020   • GERD (gastroesophageal reflux disease)    • HL (hearing loss) 1974    Tumor   • Melanoma ([address] ) 2017    Dermatology and Moh's surgery in [facility]; Right lateral upper back   • Testicular cancer [facility]) [facility]     Past Surgical History:   Procedure Laterality Date   • COLONOSCOPY      January 2021 small cecal serrated adenoma, sigmoid diverticulosis, internal hemorrhoids   • ORCHIECTOMY Right    • SKIN CANCER EXCISION     • TUMOR EXCISION      right ear as a child   • UPPER GASTROINTESTINAL ENDOSCOPY      January 2021: Schatzki ring biopsies negative for Johnson's or EOE, fundic gland polyps, mild gastritis negative for H pylori  September 2014 Schatzki ring biopsies negative for Johnson's EOE or H pylori  Family History   Problem Relation Age of Onset   • Diabetes Mother    • Hypertension Mother    • Hypertension Father    • Heart disease Father    • No Known Problems Sister    • No Known Problems Brother    • No Known Problems Brother    • Colon polyps Neg Hx    • Colon cancer Neg Hx      Social History     Socioeconomic History   • Marital status: /Civil Union     Spouse name: None   • Number of children: None   • Years of education: None   • Highest education level: None   Occupational History   • None   Tobacco Use   • Smoking status: Never   • Smokeless tobacco: Never   Vaping Use   • Vaping Use: Never used   Substance and Sexual Activity   • Alcohol use:  Yes     Alcohol/week: 1 0 standard drink     Types: 1 Cans of beer per week   • Drug use: Never   • Sexual activity: Yes     Partners: Female     Birth control/protection: None   Other Topics Concern   • None   Social History Narrative    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 [place]        2 children    Likes to [address] Strain: Not on [employer] Insecurity: Not on file Transportation Needs: Not on file   Physical Activity: Not on file   Stress: Not on file   Social Connections: Not on file   Intimate Partner Violence: Not on file   Housing Stability: Not on file     Current Outpatient Medications on File Prior to Visit   Medication Sig   • albuterol (Ventolin HFA) 90 mcg/act inhaler Inhale 2 puffs every 4 (four) hours as needed for wheezing or shortness of breath   • ketoconazole (NIZORAL) 2 % cream Apply 2x/day to bottom of feet and in between toes for 3 weeks  Apply on facial redness/rash as needed  • Multiple Vitamins-Minerals (MULTIVITAMIN ADULTS PO) Take 1 capsule by mouth daily   • RABEprazole (ACIPHEX) 20 MG tablet Take 1 tablet (20 mg total) by mouth daily   • triamcinolone (KENALOG) 0 025 % cream Apply topically 2 (two) times a day Apply sparingly 2-4 times a day for up to one week  • [DISCONTINUED] Hydrocod Fazal-Chlorphe Fazal ER (TUSSIONEX) 10-8 mg/5 mL ER suspension Take 5 mL by mouth every 12 (twelve) hours as needed for cough Max Daily Amount: 10 mL (Patient not taking: Reported on 2/7/2023)   • [DISCONTINUED] methylPREDNISolone 4 MG tablet therapy pack Use as directed on package (Patient not taking: Reported on 2/7/2023)     No Known Allergies  Immunization History   Administered Date(s) Administered   • COVID-19 PFIZER VACCINE 0 3 ML IM 03/18/2021, 04/12/2021, 12/06/2021   • Influenza Quadrivalent Recombinant Preservative Free IM 10/28/2021   • Tdap 01/14/2022       Objective     /80 (BP Location: Left arm, Patient Position: Sitting, Cuff Size: Large)   Pulse 71   Temp (!) 96 7 °F (35 9 °C) (Tympanic)   Ht 5' 9" (1 753 m)   Wt 121 kg (267 lb)   SpO2 98%   BMI 39 43 kg/m²     Physical Exam  Vitals and nursing note reviewed  Constitutional:       General: He is not in acute distress  Appearance: Normal appearance  He is obese  He is not ill-appearing, toxic-appearing or diaphoretic  HENT:      Head: Normocephalic and atraumatic        Left Ear: Tympanic membrane normal       Ears:      Comments: Right TM with scarring     Nose: Nose normal       Mouth/Throat:      Mouth: Mucous membranes are moist       Pharynx: No oropharyngeal exudate or posterior oropharyngeal erythema  Eyes:      Conjunctiva/sclera: Conjunctivae normal    Cardiovascular:      Rate and Rhythm: Normal rate and regular rhythm  Heart sounds: No murmur heard  Pulmonary:      Effort: Pulmonary effort is normal  No respiratory distress  Breath sounds: Normal breath sounds  No stridor  Abdominal:      General: Abdomen is flat  Bowel sounds are normal       Palpations: Abdomen is soft  Musculoskeletal:      Cervical back: Normal range of motion and neck supple  Right lower leg: No edema  Left lower leg: No edema  Lymphadenopathy:      Cervical: No cervical adenopathy  Skin:     General: Skin is warm and dry  Findings: No rash  Neurological:      General: No focal deficit present  Mental Status: He is alert and oriented to person, place, and time         Elida Landin DO

## 2023-02-07 ENCOUNTER — TELEPHONE (OUTPATIENT)
Dept: ADMINISTRATIVE | Facility: OTHER | Age: 58
End: 2023-02-07

## 2023-02-07 ENCOUNTER — OFFICE VISIT (OUTPATIENT)
Dept: FAMILY MEDICINE CLINIC | Facility: CLINIC | Age: 58
End: 2023-02-07

## 2023-02-07 VITALS
DIASTOLIC BLOOD PRESSURE: 80 MMHG | SYSTOLIC BLOOD PRESSURE: 142 MMHG | HEIGHT: 69 IN | WEIGHT: 267 LBS | BODY MASS INDEX: 39.55 KG/M2 | OXYGEN SATURATION: 98 % | TEMPERATURE: 96.7 F | HEART RATE: 71 BPM

## 2023-02-07 DIAGNOSIS — R05.3 PERSISTENT COUGH FOR 3 WEEKS OR LONGER: ICD-10-CM

## 2023-02-07 DIAGNOSIS — J06.9 VIRAL URI WITH COUGH: ICD-10-CM

## 2023-02-07 DIAGNOSIS — R05.3 PERSISTENT COUGH FOR 3 WEEKS OR LONGER: Primary | ICD-10-CM

## 2023-02-07 RX ORDER — FLUTICASONE PROPIONATE 100 MCG
1 BLISTER, WITH INHALATION DEVICE INHALATION 2 TIMES DAILY
Qty: 60 BLISTER | Refills: 0 | Status: SHIPPED | OUTPATIENT
Start: 2023-02-07 | End: 2023-02-10 | Stop reason: SDUPTHER

## 2023-02-07 RX ORDER — BUDESONIDE 90 UG/1
2 AEROSOL, POWDER RESPIRATORY (INHALATION) 2 TIMES DAILY
Qty: 1 EACH | Refills: 0 | Status: SHIPPED | OUTPATIENT
Start: 2023-02-07 | End: 2023-02-07

## 2023-02-07 RX ORDER — HYDROCODONE POLISTIREX AND CHLORPHENIRAMINE POLISTIREX 10; 8 MG/5ML; MG/5ML
5 SUSPENSION, EXTENDED RELEASE ORAL EVERY 12 HOURS PRN
Qty: 115 ML | Refills: 0 | Status: SHIPPED | OUTPATIENT
Start: 2023-02-07

## 2023-02-07 NOTE — LETTER
Vaccination Request Form: Influenza      Date Requested: 23  Patient: Mikki Mo  Patient : 1965   Referring Provider: Fernando Wong,        The above patient has informed us that they have had their   most recent Influenza administered at your facility  Please   complete this form and attach all corresponding documentation  Date of Vaccine(s) Given  ______________________________    Lot Number(s) _______________________________________    Manufacture(s) ______________________________________    Dose Amount (s) _____________________________________    Expiration Date(s) ____________________________________    Comments __________________________________________________________  ____________________________________________________________________  ____________________________________________________________________  ____________________________________________________________________    Administering Facility  ________________________________________________    Vaccine Administered By (print name) ___________________________________      Form Completed By (print name) _______________________________________      Signature ___________________________________________________________      These reports are needed for  compliance  Please fax this completed form and a copy of the Vaccine Document(s) to our office located at [place] [address] as soon as possible to Fax [phone] attention Edilma: Phone [phone]    We thank you for your assistance in treating our mutual patient

## 2023-02-07 NOTE — LETTER
2nd Request    Vaccination Request Form: Influenza      Date Requested: 02/10/23  Patient: Nura Newman  Patient : 1965   Referring Provider: Damien Tolentino DO       The above patient has informed us that they have had their   most recent Influenza administered at your facility  Please   complete this form and attach all corresponding documentation  Date of Vaccine(s) Given  ______________________________    Lot Number(s) _______________________________________    Manufacture(s) ______________________________________    Dose Amount (s) _____________________________________    Expiration Date(s) ____________________________________    Comments __________________________________________________________  ____________________________________________________________________  ____________________________________________________________________  ____________________________________________________________________    Administering Facility  ________________________________________________    Vaccine Administered By (print name) ___________________________________      Form Completed By (print name) _______________________________________      Signature ___________________________________________________________      These reports are needed for  compliance  Please fax this completed form and a copy of the Vaccine Document(s) to our office located at [place] [address] as soon as possible to Fax [phone] attention Edilma: Phone [phone]    We thank you for your assistance in treating our mutual patient

## 2023-02-07 NOTE — TELEPHONE ENCOUNTER
----- Message from [facility] sent at 2/7/2023 10:07 AM EST -----  Regarding: care gap request  02/07/23 10:07 AM    Hello, our patient attached above has had Immunization(s) (FLU SHOT) completed/performed  Please assist in updating the patient chart by making an External outreach to  [address] located in [place], [place]   The date of service is 10/2022    Thank you,  [facility] [address]

## 2023-02-07 NOTE — PATIENT INSTRUCTIONS
Start flonase 2 sprays in each nostril in AM  Start zyrtec 10 mg once daily  Start pulmicort inhaler twice daily  Return in 2-3 weeks for recheck

## 2023-02-08 NOTE — TELEPHONE ENCOUNTER
Upon review of the In Basket request and the patient's chart, initial outreach has been made via fax to facility  Please see Contacts section for details       Thank you  Arely Alegria MA

## 2023-02-09 ENCOUNTER — TELEPHONE (OUTPATIENT)
Dept: FAMILY MEDICINE CLINIC | Facility: CLINIC | Age: 58
End: 2023-02-09

## 2023-02-09 ENCOUNTER — PATIENT MESSAGE (OUTPATIENT)
Dept: FAMILY MEDICINE CLINIC | Facility: CLINIC | Age: 58
End: 2023-02-09

## 2023-02-09 DIAGNOSIS — R05.3 PERSISTENT COUGH FOR 3 WEEKS OR LONGER: ICD-10-CM

## 2023-02-09 NOTE — TELEPHONE ENCOUNTER
The original inhaler (albuterol) is simply a bronchodilator  I want him to have one with steroid in it to reduce inflammation  Those are pulmicort or flovent and are entirely different than the one he had  Insurance may cover them but they may not be at generic price and may have higher copay  Is that what he means by "not covered"? Can check formulary or ask pharmacist if there is an alternative corticosteroid inhaler that is less expensive

## 2023-02-09 NOTE — TELEPHONE ENCOUNTER
Alternative rx needed -    I spoke with Jamil Jovel @ [facility] and she stated that there is no way for the pharmacy to know specifically which inhalers are covered by the patient's insurance  Patient is to call his insurance and ask which inhalers are covered in the formulary and then contact our office for a new rx order from Dr Yessica Triana  I spoke with the patient and he is aware to contact the insurance and inquire regarding covered rx on formulary  Awaiting call back from patient

## 2023-02-09 NOTE — TELEPHONE ENCOUNTER
Patient called in because he was prescribed 2 inhalers so far and both weren't covered by insurance  Patient states he still has his old inhaler but wants to know if Sandroyung Torres still wants him to have a new one to use twice a day or if he can just continue to use his old one  He just wants to know the next step on this

## 2023-02-10 RX ORDER — FLUTICASONE PROPIONATE 100 MCG
1 BLISTER, WITH INHALATION DEVICE INHALATION 2 TIMES DAILY
Qty: 60 BLISTER | Refills: 0 | Status: SHIPPED | OUTPATIENT
Start: 2023-02-10 | End: 2023-03-12

## 2023-02-10 NOTE — PATIENT COMMUNICATION
Contacted pharmacy, pharmacy states that medication is to be sent to [facility], patient pays for it(can be over the phone payment or payroll deduction) and then patient needs to request it be transferred to [facility]

## 2023-02-10 NOTE — PATIENT COMMUNICATION
His inhaler was jaclyn'd up to send to [facility] in [place] (I did not actually send it yet), however not sure they can send it to [facility]? ? Do you know

## 2023-02-10 NOTE — TELEPHONE ENCOUNTER
As a follow-up, a second attempt has been made for outreach via fax to facility  Please see Contacts section for details      Thank you  Barbara Briceño MA

## 2023-02-13 NOTE — TELEPHONE ENCOUNTER
Upon review of the In Basket request we were able to locate, review, and update the patient chart as requested for Immunization(s) Influenza  Any additional questions or concerns should be emailed to the Practice Liaisons via the appropriate education email address, please do not reply via In Basket      Thank you  Claire Castellon MA

## 2023-02-21 ENCOUNTER — APPOINTMENT (EMERGENCY)
Dept: CT IMAGING | Facility: HOSPITAL | Age: 58
End: 2023-02-21

## 2023-02-21 ENCOUNTER — APPOINTMENT (OUTPATIENT)
Dept: LAB | Facility: HOSPITAL | Age: 58
End: 2023-02-21

## 2023-02-21 ENCOUNTER — HOSPITAL ENCOUNTER (EMERGENCY)
Facility: HOSPITAL | Age: 58
Discharge: HOME | End: 2023-02-21
Attending: EMERGENCY MEDICINE

## 2023-02-21 ENCOUNTER — HOSPITAL ENCOUNTER (OUTPATIENT)
Dept: RADIOLOGY | Facility: HOSPITAL | Age: 58
Discharge: HOME | End: 2023-02-21

## 2023-02-21 ENCOUNTER — OFFICE VISIT (OUTPATIENT)
Dept: FAMILY MEDICINE CLINIC | Facility: CLINIC | Age: 58
End: 2023-02-21

## 2023-02-21 VITALS
RESPIRATION RATE: 18 BRPM | SYSTOLIC BLOOD PRESSURE: 155 MMHG | OXYGEN SATURATION: 95 % | HEART RATE: 86 BPM | TEMPERATURE: 98.9 F | DIASTOLIC BLOOD PRESSURE: 80 MMHG

## 2023-02-21 VITALS
WEIGHT: 271.2 LBS | OXYGEN SATURATION: 98 % | TEMPERATURE: 97.6 F | HEIGHT: 69 IN | HEART RATE: 106 BPM | BODY MASS INDEX: 40.17 KG/M2

## 2023-02-21 DIAGNOSIS — Z00.00 ENCOUNTER FOR ANNUAL PHYSICAL EXAM: Primary | ICD-10-CM

## 2023-02-21 DIAGNOSIS — J18.9 COMMUNITY ACQUIRED BILATERAL LOWER LOBE PNEUMONIA: Primary | ICD-10-CM

## 2023-02-21 DIAGNOSIS — R05.3 CHRONIC COUGH: ICD-10-CM

## 2023-02-21 DIAGNOSIS — Z85.47 HISTORY OF TESTICULAR CANCER: ICD-10-CM

## 2023-02-21 DIAGNOSIS — R06.02 SHORTNESS OF BREATH: ICD-10-CM

## 2023-02-21 DIAGNOSIS — B37.0 THRUSH: ICD-10-CM

## 2023-02-21 DIAGNOSIS — Z12.5 SCREENING FOR MALIGNANT NEOPLASM OF PROSTATE: ICD-10-CM

## 2023-02-21 DIAGNOSIS — R35.0 URINARY FREQUENCY: ICD-10-CM

## 2023-02-21 DIAGNOSIS — R05.1 ACUTE COUGH: ICD-10-CM

## 2023-02-21 LAB
ANION GAP SERPL CALCULATED.3IONS-SCNC: 6 MMOL/L (ref 4–13)
BASOPHILS # BLD AUTO: 0.04 THOUSANDS/ÂΜL (ref 0–0.1)
BASOPHILS NFR BLD AUTO: 0 % (ref 0–1)
BUN SERPL-MCNC: 10 MG/DL (ref 5–25)
CALCIUM SERPL-MCNC: 8.2 MG/DL (ref 8.4–10.2)
CHLORIDE SERPL-SCNC: 102 MMOL/L (ref 96–108)
CO2 SERPL-SCNC: 26 MMOL/L (ref 21–32)
CREAT SERPL-MCNC: 0.85 MG/DL (ref 0.6–1.3)
EOSINOPHIL # BLD AUTO: 0.01 THOUSAND/ÂΜL (ref 0–0.61)
EOSINOPHIL NFR BLD AUTO: 0 % (ref 0–6)
ERYTHROCYTE [DISTWIDTH] IN BLOOD BY AUTOMATED COUNT: 13.3 % (ref 11.6–15.1)
GFR SERPL CREATININE-BSD FRML MDRD: 96 ML/MIN/1.73SQ M
GLUCOSE SERPL-MCNC: 122 MG/DL (ref 65–140)
HCT VFR BLD AUTO: 39.4 % (ref 36.5–49.3)
HGB BLD-MCNC: 13.5 G/DL (ref 12–17)
IMM GRANULOCYTES # BLD AUTO: 0.21 THOUSAND/UL (ref 0–0.2)
IMM GRANULOCYTES NFR BLD AUTO: 1 % (ref 0–2)
LYMPHOCYTES # BLD AUTO: 1.04 THOUSANDS/ÂΜL (ref 0.6–4.47)
LYMPHOCYTES NFR BLD AUTO: 5 % (ref 14–44)
MCH RBC QN AUTO: 30.1 PG (ref 26.8–34.3)
MCHC RBC AUTO-ENTMCNC: 34.3 G/DL (ref 31.4–37.4)
MCV RBC AUTO: 88 FL (ref 82–98)
MONOCYTES # BLD AUTO: 1.3 THOUSAND/ÂΜL (ref 0.17–1.22)
MONOCYTES NFR BLD AUTO: 7 % (ref 4–12)
NEUTROPHILS # BLD AUTO: 16.49 THOUSANDS/ÂΜL (ref 1.85–7.62)
NEUTS SEG NFR BLD AUTO: 87 % (ref 43–75)
NRBC BLD AUTO-RTO: 0 /100 WBCS
PLATELET # BLD AUTO: 235 THOUSANDS/UL (ref 149–390)
PMV BLD AUTO: 10.9 FL (ref 8.9–12.7)
POTASSIUM SERPL-SCNC: 3.9 MMOL/L (ref 3.5–5.3)
RBC # BLD AUTO: 4.48 MILLION/UL (ref 3.88–5.62)
SARS-COV-2 AG UPPER RESP QL IA: NEGATIVE
SODIUM SERPL-SCNC: 134 MMOL/L (ref 135–147)
VALID CONTROL: NORMAL
WBC # BLD AUTO: 19.09 THOUSAND/UL (ref 4.31–10.16)

## 2023-02-21 RX ORDER — AMOXICILLIN AND CLAVULANATE POTASSIUM 875; 125 MG/1; MG/1
1 TABLET, FILM COATED ORAL EVERY 12 HOURS SCHEDULED
Qty: 14 TABLET | Refills: 0 | Status: SHIPPED | OUTPATIENT
Start: 2023-02-21 | End: 2023-03-01 | Stop reason: ALTCHOICE

## 2023-02-21 RX ADMIN — IOHEXOL 85 ML: 350 INJECTION, SOLUTION INTRAVENOUS at 20:21

## 2023-02-21 NOTE — PROGRESS NOTES
ADULT ANNUAL [facility]    NAME: Clarke Pedraza  AGE: 62 y o  SEX: male  : 1965     DATE: 2023     Assessment and Plan:     Problem List Items Addressed This Visit        Other    History of testicular cancer    Relevant Orders    Ambulatory Referral to Urology  S/p orchiectomy  Used to follow with a local urologist  Needs a new [facility] urologist  Referral placed  Other Visit Diagnoses     Encounter for annual physical exam    -  Primary  Colon cancer screen up to date  PSA discussed and ordered  Discussed diet and exercise  Immunizations are up to date  Screening labs completed in august of last year  Urinary frequency        Relevant Orders    Ambulatory Referral to Urology    Acute cough        Relevant Orders    POCT Rapid Covid Ag    Screening for malignant neoplasm of prostate        Relevant Orders    PSA, Total Screen    Shortness of breath        Relevant Orders    POCT Rapid Covid Ag    Chronic cough      Patient has chronic cough since mid January, with recent worsening in his cough and shortness of breath last night  Difficult to say whether this is a continuation of his previous issue or an acute illness on top of the chronic cough  Rapid covid in office today was negative  Will repeat CXR and check cbc today  I gave him rx for augmentin have him continue corticosteroid inhaler and advised he f/u in 1 week  Of course if sx worsen in severity he is to call or proceed to ED  He has appt scheduled with pulmonology for end of march and will keep this as scheduled  Thrush  Secondary to inhaled corticosteroid  Patient does state that he has been rinsing mouth  Will give him nystatin suspension for this  Immunizations and preventive care screenings were discussed with patient today  Appropriate education was printed on patient's after visit summary      Discussed risks and benefits of prostate cancer screening  We discussed the controversial history of PSA screening for prostate cancer in the [place] as well as the risk of over detection and over treatment of prostate cancer by way of PSA screening  The patient understands that PSA blood testing is an imperfect way to screen for prostate cancer and that elevated PSA levels in the blood may also be caused by infection, inflammation, prostatic trauma or manipulation, urological procedures, or by benign prostatic enlargement  The role of the digital rectal examination in prostate cancer screening was also discussed and I discussed with him that there is large interobserver variability in the findings of digital rectal examination  Counseling:  Alcohol/drug use: discussed moderation in alcohol intake, the recommendations for healthy alcohol use, and avoidance of illicit drug use  Dental Health: discussed importance of regular tooth brushing, flossing, and dental visits  Injury prevention: discussed safety/seat belts, safety helmets, smoke detectors, carbon dioxide detectors, and smoking near bedding or upholstery  Sexual health: discussed sexually transmitted diseases, partner selection, use of condoms, avoidance of unintended pregnancy, and contraceptive alternatives  Exercise: the importance of regular exercise/physical activity was discussed  Recommend exercise 3-5 times per week for at least 30 minutes  BMI Counseling: Body mass index is 40 05 kg/m²  The BMI is above normal  Nutrition recommendations include decreasing portion sizes, encouraging healthy choices of fruits and vegetables, moderation in carbohydrate intake and increasing intake of lean protein  Exercise recommendations include exercising 3-5 times per week  No pharmacotherapy was ordered  Rationale for BMI follow-up plan is due to patient being overweight or obese       Depression Screening and Follow-up Plan: Patient was screened for depression during today's encounter  They screened negative with a PHQ-2 score of 0  Return in about 1 week (around 2/28/2023)  Chief Complaint:     Chief Complaint   Patient presents with   • Physical Exam   • Cold Like Symptoms     Cough, mucus, headache when coughing, shortness of breathe, chills      History of Present Illness:     Adult Annual Physical   Patient is a 62year old male with GERD, history of testicular CA and history of melanoma who is here for his scheduled comprehensive physical exam      Patient was recently seen here on 2/17/23 for complaint of a cough that had been going on since mid January  Initially seen at urgent care on 1/14/23 at which time he had negative covid test and was treated with medrol dose pack, albuterol inhaler and tessalon perles  Sx did not improve and he was seen here in office in f/u on 1/19/23 at which time he was given course of doxycycline  He had CXR done on 1/19/23 which showed no acute cardiopulmonary disease  At his visit on 2/17/23, patient was still complaining of productive cough that was keeping him awake at night along with some shortness of breath with exertion  We had discussed potential causes of chronic cough  He was given rx for inhaled corticosteroid and advised to use flonase and zyrtec for any post nasal drip that could be contributing to his cough  He has been using the steroid inhaler as ordered for about 10 days and notes that he was feeling a little better with respect to the cough and had no shortness of breath at all until last night when he seemed to take a turn for the worse  States that last PM, he started with chills, worsening cough and a headache  In addition, patient reports that he has been feeling short of breath since last night  No fever  No ill contacts  No nasal congestion  Did not complete covid test at home prior to arrival in office  Patient is taking aciphex for his GERD which works well for him     It has been awhile since he saw urologist and plans to schedule  His colon cancer screen is up to date (1/11/21)  PSA completed 12/17/21 and was normal    Hep C screening complete  Lipid panel and A1c done in august of 2022  Immunization History   Administered Date(s) Administered   • COVID-19 PFIZER VACCINE 0 3 ML IM 03/18/2021, 04/12/2021, 12/06/2021   • INFLUENZA 10/20/2022   • Influenza Quadrivalent Recombinant Preservative Free IM 10/28/2021   • Tdap 01/14/2022         Diet and Physical Activity  Diet/Nutrition: poor diet  Exercise: no formal exercise  Depression Screening  PHQ-2/9 Depression Screening    Little interest or pleasure in doing things: 0 - not at all  Feeling down, depressed, or hopeless: 0 - not at all  PHQ-2 Score: 0  PHQ-2 Interpretation: Negative depression screen       General Health  Sleep: sleeps well  Hearing: decreased - right  Vision: no vision problems  Wears reading glasses  Dental: regular dental visits   Health  Symptoms include: urinary frequency     Review of Systems:     Review of Systems   Past Medical History:     Past Medical History:   Diagnosis Date   • Cancer ([facility] [address] ) 2018   • Colon polyp    • COVID-19 12/2020   • GERD (gastroesophageal reflux disease)    • HL (hearing loss) 1974    Tumor   • Melanoma ([facility] [address] ) 2017    Dermatology and Moh's surgery in [facility]; Right lateral upper back   • Testicular cancer [facility]) 2000    Dr Yael Restrepo and [facility] (urology)      Past Surgical History:     Past Surgical History:   Procedure Laterality Date   • COLONOSCOPY      January 2021 small cecal serrated adenoma, sigmoid diverticulosis, internal hemorrhoids   • ORCHIECTOMY Right    • SKIN CANCER EXCISION     • TUMOR EXCISION      right ear as a child   • UPPER GASTROINTESTINAL ENDOSCOPY      January 2021: Schatzki ring biopsies negative for Johnson's or EOE, fundic gland polyps, mild gastritis negative for H pylori     September 2014 Schatzki ring biopsies negative for Johnson's EOE or H pylori  Family History:     Family History   Problem Relation Age of Onset   • Diabetes Mother    • Hypertension Mother    • Hypertension Father    • Heart disease Father    • No Known Problems Sister    • No Known Problems Brother    • No Known Problems Brother    • Colon polyps Neg Hx    • Colon cancer Neg Hx       Social History:     Social History     Socioeconomic History   • Marital status: /Civil Union     Spouse name: None   • Number of children: None   • Years of education: None   • Highest education level: None   Occupational History   • None   Tobacco Use   • Smoking status: Never   • Smokeless tobacco: Never   Vaping Use   • Vaping Use: Never used   Substance and Sexual Activity   • Alcohol use:  Yes     Alcohol/week: 1 0 standard drink     Types: 1 Cans of beer per week   • Drug use: Never   • Sexual activity: Yes     Partners: Female     Birth control/protection: None   Other Topics Concern   • None   Social History Narrative    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 [place]        2 children    Likes to [address] Strain: Not on [employer] Insecurity: Not on file   Transportation Needs: Not on file   Physical Activity: Not on file   Stress: Not on file   Social Connections: Not on file   Intimate Partner Violence: Not on file   Housing Stability: Not on file      Current Medications:     Current Outpatient Medications   Medication Sig Dispense Refill   • albuterol (Ventolin HFA) 90 mcg/act inhaler Inhale 2 puffs every 4 (four) hours as needed for wheezing or shortness of breath 18 g 0   • amoxicillin-clavulanate (AUGMENTIN) 875-125 mg per tablet Take 1 tablet by mouth every 12 (twelve) hours for 7 days 14 tablet 0   • fluticasone (Flovent Diskus) 100 mcg/actuation diskus inhaler Inhale 1 puff 2 (two) times a day 60 blister 0   • Hydrocod Fazal-Chlorphe Fazal ER (TUSSIONEX) 10-8 mg/5 mL ER suspension Take 5 mL by mouth every 12 (twelve) hours as needed for cough Max Daily Amount: 10 mL 115 mL 0   • ketoconazole (NIZORAL) 2 % cream Apply 2x/day to bottom of feet and in between toes for 3 weeks  Apply on facial redness/rash as needed  60 g 5   • Multiple Vitamins-Minerals (MULTIVITAMIN ADULTS PO) Take 1 capsule by mouth daily     • nystatin (MYCOSTATIN) 500,000 units/5 mL suspension Apply 5 mL (500,000 Units total) to the mouth or throat 4 (four) times a day 60 mL 0   • RABEprazole (ACIPHEX) 20 MG tablet Take 1 tablet (20 mg total) by mouth daily 90 tablet 3   • triamcinolone (KENALOG) 0 025 % cream Apply topically 2 (two) times a day Apply sparingly 2-4 times a day for up to one week  30 g 0     No current facility-administered medications for this visit  Allergies:     No Known Allergies   Physical Exam:     Pulse (!) 106   Temp 97 6 °F (36 4 °C) (Tympanic)   Ht 5' 9" (1 753 m)   Wt 123 kg (271 lb 3 2 oz)   SpO2 98%   BMI 40 05 kg/m²     Physical Exam  Vitals and nursing note reviewed  Constitutional:       General: He is not in acute distress  Appearance: Normal appearance  He is obese  He is not ill-appearing, toxic-appearing or diaphoretic  HENT:      Head: Normocephalic and atraumatic  Left Ear: Tympanic membrane normal       Ears:      Comments: Scarring right TM     Nose: Nose normal       Mouth/Throat:      Mouth: Mucous membranes are moist       Pharynx: No oropharyngeal exudate or posterior oropharyngeal erythema  Comments: + white plaques  Eyes:      Conjunctiva/sclera: Conjunctivae normal    Cardiovascular:      Rate and Rhythm: Regular rhythm  Tachycardia present  Heart sounds: No murmur heard  Pulmonary:      Effort: Pulmonary effort is normal       Breath sounds: No wheezing  Comments: Decreased breath sounds bilateral bases; tight cough throughout the office visit  No wheezes or rales noted     Musculoskeletal:      Cervical back: Normal range of motion and neck supple  Right lower leg: No edema  Left lower leg: No edema  Lymphadenopathy:      Cervical: No cervical adenopathy  Skin:     General: Skin is warm and dry  Findings: No rash  Neurological:      General: No focal deficit present  Mental Status: He is alert and oriented to person, place, and time            [address] [facility]

## 2023-02-22 NOTE — ED PROVIDER NOTES
History  Chief Complaint   Patient presents with   • Cough     Pt presents to the ED today for cough for the last months  Today complains of SOB and fatigue  Went to PCP, had chest x-ray, pt states "pcp said pneumonia" and also found a spot on the lung  80-year-old male presents for evaluation of a cough that has been ongoing for months but got worse in the past week or so  He states that he was seen by his PCP and had a chest x-ray done that showed a pneumonia however there was also a concerning spot so he was sent to the emergency department for further evaluation of possible blood clot due to the location of this and the patient's tachycardia  Prior to Admission Medications   Prescriptions Last Dose Informant Patient Reported? Taking? Hydrocod Fazal-Chlorphe Fazal ER (TUSSIONEX) 10-8 mg/5 mL ER suspension   No No   Sig: Take 5 mL by mouth every 12 (twelve) hours as needed for cough Max Daily Amount: 10 mL   Multiple Vitamins-Minerals (MULTIVITAMIN ADULTS PO)   Yes No   Sig: Take 1 capsule by mouth daily   RABEprazole (ACIPHEX) 20 MG tablet   No No   Sig: Take 1 tablet (20 mg total) by mouth daily   albuterol (Ventolin HFA) 90 mcg/act inhaler   No No   Sig: Inhale 2 puffs every 4 (four) hours as needed for wheezing or shortness of breath   amoxicillin-clavulanate (AUGMENTIN) 875-125 mg per tablet   No No   Sig: Take 1 tablet by mouth every 12 (twelve) hours for 7 days   fluticasone (Flovent Diskus) 100 mcg/actuation diskus inhaler   No No   Sig: Inhale 1 puff 2 (two) times a day   ketoconazole (NIZORAL) 2 % cream   No No   Sig: Apply 2x/day to bottom of feet and in between toes for 3 weeks  Apply on facial redness/rash as needed     nystatin (MYCOSTATIN) 500,000 units/5 mL suspension   No No   Sig: Apply 5 mL (500,000 Units total) to the mouth or throat 4 (four) times a day   triamcinolone (KENALOG) 0 025 % cream   No No   Sig: Apply topically 2 (two) times a day Apply sparingly 2-4 times a day for up to one week  Facility-Administered Medications: None       Past Medical History:   Diagnosis Date   • Cancer ([facility] [address] ) 2018   • Colon polyp    • COVID-19 12/2020   • GERD (gastroesophageal reflux disease)    • HL (hearing loss) 1974    Tumor   • Melanoma ([facility] [address] ) 2017    Dermatology and Moh's surgery in [facility]; Right lateral upper back   • Testicular cancer [facility]) 2000    Dr Andrea Doe and [facility] (urology)       Past Surgical History:   Procedure Laterality Date   • COLONOSCOPY      January 2021 small cecal serrated adenoma, sigmoid diverticulosis, internal hemorrhoids   • ORCHIECTOMY Right    • SKIN CANCER EXCISION     • TUMOR EXCISION      right ear as a child   • UPPER GASTROINTESTINAL ENDOSCOPY      January 2021: Schatzki ring biopsies negative for Johnson's or EOE, fundic gland polyps, mild gastritis negative for H pylori  September 2014 Schatzki ring biopsies negative for Johnson's EOE or H pylori  Family History   Problem Relation Age of Onset   • Diabetes Mother    • Hypertension Mother    • Hypertension Father    • Heart disease Father    • No Known Problems Sister    • No Known Problems Brother    • No Known Problems Brother    • Colon polyps Neg Hx    • Colon cancer Neg Hx      I have reviewed and agree with the history as documented  E-Cigarette/Vaping   • E-Cigarette Use Never User      E-Cigarette/Vaping Substances   • Nicotine No    • THC No    • CBD No    • Flavoring No    • Other No    • Unknown No      Social History     Tobacco Use   • Smoking status: Never   • Smokeless tobacco: Never   Vaping Use   • Vaping Use: Never used   Substance Use Topics   • Alcohol use: Yes     Alcohol/week: 1 0 standard drink     Types: 1 Cans of beer per week   • Drug use: Never       Review of Systems   Constitutional: Positive for fever  Respiratory: Positive for cough and shortness of breath  Physical Exam  Physical Exam  Vitals and nursing note reviewed     Constitutional: General: He is not in acute distress  Appearance: He is well-developed  HENT:      Head: Normocephalic and atraumatic  Right Ear: External ear normal       Left Ear: External ear normal       Mouth/Throat:      Pharynx: No oropharyngeal exudate  Eyes:      General: No scleral icterus  Pupils: Pupils are equal, round, and reactive to light  Cardiovascular:      Rate and Rhythm: Normal rate and regular rhythm  Heart sounds: Normal heart sounds  Pulmonary:      Effort: Pulmonary effort is normal  No respiratory distress  Breath sounds: Examination of the right-lower field reveals decreased breath sounds  Examination of the left-lower field reveals decreased breath sounds  Decreased breath sounds present  Abdominal:      General: Bowel sounds are normal       Palpations: Abdomen is soft  Tenderness: There is no abdominal tenderness  There is no guarding or rebound  Musculoskeletal:         General: Normal range of motion  Cervical back: Normal range of motion and neck supple  Skin:     General: Skin is warm and dry  Findings: No rash  Neurological:      Mental Status: He is alert and oriented to person, place, and time           Vital Signs  ED Triage Vitals [02/21/23 1759]   Temperature Pulse Respirations Blood Pressure SpO2   98 9 °F (37 2 °C) 94 18 139/77 95 %      Temp Source Heart Rate Source Patient Position - Orthostatic VS BP Location FiO2 (%)   Oral Monitor Sitting Left arm --      Pain Score       No Pain           Vitals:    02/21/23 1815 02/21/23 1915 02/21/23 1930 02/21/23 2030   BP: 162/84 147/88 140/81 155/80   Pulse: 104 82 76 86   Patient Position - Orthostatic VS: Sitting Sitting Sitting Sitting         Visual Acuity  Visual Acuity    Flowsheet Row Most Recent Value   L Pupil Size (mm) 3   R Pupil Size (mm) 3          ED Medications  Medications   iohexol (OMNIPAQUE) 350 MG/ML injection (SINGLE-DOSE) 100 mL (85 mL Intravenous Given 2/21/23 2021) Diagnostic Studies  Results Reviewed     Procedure Component Value Units Date/Time    Basic metabolic panel [[id number]]  (Abnormal) Collected: 02/21/23 1913    Lab Status: Final result Specimen: Blood from Arm, Right Updated: 02/21/23 1946     Sodium 134 mmol/L      Potassium 3 9 mmol/L      Chloride 102 mmol/L      CO2 26 mmol/L      ANION GAP 6 mmol/L      BUN 10 mg/dL      Creatinine 0 85 mg/dL      Glucose 122 mg/dL      Calcium 8 2 mg/dL      eGFR 96 ml/min/1 73sq m     Narrative:      [place] guidelines for Chronic Kidney Disease (CKD):   •  Stage 1 with normal or high GFR (GFR > 90 mL/min/1 73 square meters)  •  Stage 2 Mild CKD (GFR = 60-89 mL/min/1 73 square meters)  •  Stage 3A Moderate CKD (GFR = 45-59 mL/min/1 73 square meters)  •  Stage 3B Moderate CKD (GFR = 30-44 mL/min/1 73 square meters)  •  Stage 4 Severe CKD (GFR = 15-29 mL/min/1 73 square meters)  •  Stage 5 End Stage CKD (GFR <15 mL/min/1 73 square meters)  Note: GFR calculation is accurate only with a steady state creatinine                 CTA ED chest PE study   Final Result by Jc Her MD (02/21 2052)      No definite pulmonary emboli but segmental and subsegmental emboli cannot be completely excluded due to extensive motion artifact  Moderate patchy consolidation in both lower lobes due to pneumonia  The study was marked in [facility] for immediate notification  Workstation performed: [id number]                    Procedures  Procedures         ED Course  ED Course as of 02/22/23 0024   Tue Feb 21, 2023 2108 V[address] the CT result with the patient  We discussed the diagnosis of the bilateral pneumonia  The patient was previously prescribed Augmentin by Dr Mia Monge for a 1 week duration    Patient was advised to begin and complete the entire course of antibiotics outpatient treatment rather than necessity for admission as the patient is low risk for complications of community-acquired pneumonia                               SBIRT 20yo+    Flowsheet Row Most Recent Value   SBIRT (25 yo +)    In order to provide better care to our patients, we are screening all of our patients for alcohol and drug use  Would it be okay to ask you these screening questions? Yes Filed at: 02/21/2023 1916   Initial Alcohol Screen: US AUDIT-C     1  How often do you have a drink containing alcohol? 0 Filed at: 02/21/2023 1916   2  How many drinks containing alcohol do you have on a typical day you are drinking? 0 Filed at: 02/21/2023 1916   3a  Male UNDER 65: How often do you have five or more drinks on one occasion? 0 Filed at: 02/21/2023 1916   3b  FEMALE Any Age, or MALE 65+: How often do you have 4 or more drinks on one occassion? 0 Filed at: 02/21/2023 1916   Audit-C Score 0 Filed at: 02/21/2023 1916   RAMIN: How many times in the past year have you    Used an illegal drug or used a prescription medication for non-medical reasons? Never Filed at: 02/21/2023 1916                    Medical Decision Making  Differential diagnosis: Pneumonia, pulmonary embolism, viral syndrome, pulmonary infarct    The plan is for laboratories and CT of the chest rule out pulmonary embolism        Community acquired bilateral lower lobe pneumonia: complicated acute illness or injury  Amount and/or Complexity of Data Reviewed  External Data Reviewed: notes  Details: External x-ray and Dr Cassandra Gasca office note from today reviewed  Labs: ordered  Radiology: ordered  Risk  Prescription drug management            Disposition  Final diagnoses:   Community acquired bilateral lower lobe pneumonia     Time reflects when diagnosis was documented in both MDM as applicable and the Disposition within this note     Time User Action Codes Description Comment    2/21/2023  9:13 PM Mia Rios Add [J18 9] Community acquired bilateral lower lobe pneumonia       ED Disposition     ED Disposition   Discharge    Condition   Stable Date/Time   Tue Feb 21, 2023  9:12 PM    Comment   Mao Vanegas discharge to home/self care  Follow-up Information     Follow up With Specialties Details Why Contact Info Additional [address], DO Family Medicine Schedule an appointment as soon as possible for a visit in 1 week For further evaluation, if not improved [address] [facility] [address] [facility] Emergency Department Emergency Medicine Go to  If symptoms worsen [address] [place],[address] [address]  [address] [facility] Emergency Department, [address], [facility], [facility] [address]          Discharge Medication List as of 2/21/2023  9:13 PM      CONTINUE these medications which have NOT CHANGED    Details   albuterol (Ventolin HFA) 90 mcg/act inhaler Inhale 2 puffs every 4 (four) hours as needed for wheezing or shortness of breath, Starting Sat 1/14/2023, Normal      amoxicillin-clavulanate (AUGMENTIN) 875-125 mg per tablet Take 1 tablet by mouth every 12 (twelve) hours for 7 days, Starting Tue 2/21/2023, Until Tue 2/28/2023, Normal      fluticasone (Flovent Diskus) 100 mcg/actuation diskus inhaler Inhale 1 puff 2 (two) times a day, Starting Fri 2/10/2023, Until Sun 3/12/2023, Normal      Hydrocod Fazal-Chlorphe Fazal ER (TUSSIONEX) 10-8 mg/5 mL ER suspension Take 5 mL by mouth every 12 (twelve) hours as needed for cough Max Daily Amount: 10 mL, Starting Tue 2/7/2023, Normal      ketoconazole (NIZORAL) 2 % cream Apply 2x/day to bottom of feet and in between toes for 3 weeks   Apply on facial redness/rash as needed , Normal      Multiple Vitamins-Minerals (MULTIVITAMIN ADULTS PO) Take 1 capsule by mouth daily, Historical Med      nystatin (MYCOSTATIN) 500,000 units/5 mL suspension Apply 5 mL (500,000 Units total) to the mouth or throat 4 (four) times a day, Starting Tue 2/21/2023, Normal      RABEprazole (ACIPHEX) 20 MG tablet Take 1 tablet (20 mg total) by mouth daily, Starting Mon 1/16/2023, Normal      triamcinolone (KENALOG) 0 025 % cream Apply topically 2 (two) times a day Apply sparingly 2-4 times a day for up to one week , Starting Mon 4/4/2022, Normal             No discharge procedures on file      PDMP Review       Value Time User    PDMP Reviewed  Yes 1/23/2023 12:47 PM Que Buenrostro DO          ED Provider  Electronically Signed by           Tera Noonan DO  02/22/23 8599

## 2023-03-01 ENCOUNTER — OFFICE VISIT (OUTPATIENT)
Dept: FAMILY MEDICINE CLINIC | Facility: CLINIC | Age: 58
End: 2023-03-01

## 2023-03-01 VITALS
DIASTOLIC BLOOD PRESSURE: 86 MMHG | BODY MASS INDEX: 39.1 KG/M2 | SYSTOLIC BLOOD PRESSURE: 136 MMHG | OXYGEN SATURATION: 99 % | HEART RATE: 104 BPM | WEIGHT: 264 LBS | HEIGHT: 69 IN | TEMPERATURE: 96.9 F

## 2023-03-01 DIAGNOSIS — J18.9 PNEUMONIA OF BOTH LOWER LOBES DUE TO INFECTIOUS ORGANISM: Primary | ICD-10-CM

## 2023-03-01 DIAGNOSIS — M25.512 LEFT SHOULDER PAIN, UNSPECIFIED CHRONICITY: ICD-10-CM

## 2023-03-01 DIAGNOSIS — I31.39 PERICARDIAL EFFUSION: ICD-10-CM

## 2023-03-01 NOTE — PROGRESS NOTES
Name: Ailyn Nina      : 1965      MRN: [id number]  Encounter Provider: Erin Benito DO  Encounter Date: 3/1/2023   Encounter department: [address] [place] [address]     1  Pneumonia of both lower lobes due to infectious organism  -     CT chest wo contrast; Future; Expected date: 2023  Reviewed CT scan done in ED  Patient completed course of augmentin  Is feeling better overall with exception of slight cough and some left shoulder pain  Lungs are clear to auscultation today and he has good oxygen saturation  Suspect that this pain in left shoulder is referred pain or related to sprain strain from coughing  Will have him repeat CT of chest in 1 week to ensure that pneumonia has cleared  Patient agreeable to this plan and will call if sx worsen or there are any changes in his condition  2  Left shoulder pain, unspecified chronicity  -     CT chest wo contrast; Future; Expected date: 2023           Subjective     HPI Patient is a 62year old male with GERD, history of testicular CA and history of melanoma who is here today for f/u on his bilateral lower lobe pneumonia  Patient was seen here on 23 for his annual physical as well as for f/u on a chronic cough that had been doing on since January  He noted that the night prior to his appointment on 23, cough had worsened in severity, he was getting more short of breath and he had developed chills  Breath sounds were decreased in both bases on exam and he was tachycardic  A  CXR was ordered which showed "Left lower lobe airspace disease, most likely infection  Given morphology, differential diagnosis could include atelectasis and infarct"  I sent him to ED for CT PE study  CT scan in ED showed No definite pulmonary emboli but segmental and subsegmental emboli cannot be completely excluded due to extensive motion artifact   Moderate patchy consolidation in both lower lobes due to pneumonia  Patient was prescribed augmentin 875 mg bid x 7 days  He has completed the augmentin as prescribed  Is feeling better overall, however still with slight cough productive of clear sputum  His shortness of breath has improved  No fever or chills  Developed some pain in left shoulder over the past weekend which seems to really not bother him much during the day but gets worse when he sits down to relax in evening  This pain radiates down to left upper back/flank region  No pain with movement of shoulder or with deep breathing  No diarrhea since taking augmentin but stools are looser than usual      Review of Systems    Past Medical History:   Diagnosis Date   • Cancer ([facility] ) 2018   • Colon polyp    • COVID-19 12/2020   • GERD (gastroesophageal reflux disease)    • HL (hearing loss) 1974    Tumor   • Melanoma ([facility] ) 2017    Dermatology and Moh's surgery in [facility]; Right lateral upper back   • Testicular cancer [facility]) 2000    Dr Mayte Baron and [facility] (urology)     Past Surgical History:   Procedure Laterality Date   • COLONOSCOPY      January 2021 small cecal serrated adenoma, sigmoid diverticulosis, internal hemorrhoids   • ORCHIECTOMY Right    • SKIN CANCER EXCISION     • TUMOR EXCISION      right ear as a child   • UPPER GASTROINTESTINAL ENDOSCOPY      January 2021: Schatzki ring biopsies negative for Johnson's or EOE, fundic gland polyps, mild gastritis negative for H pylori  September 2014 Schatzki ring biopsies negative for Johnson's EOE or H pylori       Family History   Problem Relation Age of Onset   • Diabetes Mother    • Hypertension Mother    • Hypertension Father    • Heart disease Father    • No Known Problems Sister    • No Known Problems Brother    • No Known Problems Brother    • Colon polyps Neg Hx    • Colon cancer Neg Hx      Social History     Socioeconomic History   • Marital status: /Civil Union     Spouse name: None   • Number of children: None   • Years of education: None   • Highest education level: None   Occupational History   • None   Tobacco Use   • Smoking status: Never   • Smokeless tobacco: Never   Vaping Use   • Vaping Use: Never used   Substance and Sexual Activity   • Alcohol use: Yes     Alcohol/week: 1 0 standard drink     Types: 1 Cans of beer per week   • Drug use: Never   • Sexual activity: Yes     Partners: Female     Birth control/protection: None   Other Topics Concern   • None   Social History Narrative    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 [place]        2 children    Likes to [address] Strain: Not on [employer] Insecurity: Not on file   Transportation Needs: Not on file   Physical Activity: Not on file   Stress: Not on file   Social Connections: Not on file   Intimate Partner Violence: Not on file   Housing Stability: Not on file     Current Outpatient Medications on File Prior to Visit   Medication Sig   • albuterol (Ventolin HFA) 90 mcg/act inhaler Inhale 2 puffs every 4 (four) hours as needed for wheezing or shortness of breath   • fluticasone (Flovent Diskus) 100 mcg/actuation diskus inhaler Inhale 1 puff 2 (two) times a day   • Hydrocod Fazal-Chlorphe Fazal ER (TUSSIONEX) 10-8 mg/5 mL ER suspension Take 5 mL by mouth every 12 (twelve) hours as needed for cough Max Daily Amount: 10 mL   • ketoconazole (NIZORAL) 2 % cream Apply 2x/day to bottom of feet and in between toes for 3 weeks  Apply on facial redness/rash as needed  (Patient taking differently: as needed Apply 2x/day to bottom of feet and in between toes for 3 weeks   Apply on facial redness/rash as needed )   • Multiple Vitamins-Minerals (MULTIVITAMIN ADULTS PO) Take 1 capsule by mouth daily   • nystatin (MYCOSTATIN) 500,000 units/5 mL suspension Apply 5 mL (500,000 Units total) to the mouth or throat 4 (four) times a day   • RABEprazole (ACIPHEX) 20 MG tablet Take 1 tablet (20 mg total) by mouth daily   • triamcinolone (KENALOG) 0 025 % cream Apply topically 2 (two) times a day Apply sparingly 2-4 times a day for up to one week  (Patient taking differently: Apply topically as needed Apply sparingly 2-4 times a day for up to one week )   • [] amoxicillin-clavulanate (AUGMENTIN) 875-125 mg per tablet Take 1 tablet by mouth every 12 (twelve) hours for 7 days (Patient not taking: Reported on 3/1/2023)     No Known Allergies  Immunization History   Administered Date(s) Administered   • COVID-19 PFIZER VACCINE 0 3 ML IM 2021, 2021, 2021   • INFLUENZA 10/20/2022   • Influenza Quadrivalent Recombinant Preservative Free IM 10/28/2021   • Tdap 2022       Objective     /86 (BP Location: Left arm, Patient Position: Sitting, Cuff Size: Large)   Pulse 104   Temp (!) 96 9 °F (36 1 °C) (Tympanic)   Ht 5' 9" (1 753 m)   Wt 120 kg (264 lb)   SpO2 99%   BMI 38 99 kg/m²     Physical Exam  Vitals and nursing note reviewed  Constitutional:       General: He is not in acute distress  Appearance: Normal appearance  He is obese  He is not ill-appearing, toxic-appearing or diaphoretic  HENT:      Head: Normocephalic and atraumatic  Right Ear: Tympanic membrane normal       Left Ear: Tympanic membrane normal       Nose: Nose normal       Mouth/Throat:      Mouth: Mucous membranes are moist       Pharynx: No oropharyngeal exudate or posterior oropharyngeal erythema  Eyes:      Conjunctiva/sclera: Conjunctivae normal    Cardiovascular:      Rate and Rhythm: Normal rate and regular rhythm  Heart sounds: No murmur heard  Pulmonary:      Effort: Pulmonary effort is normal  No respiratory distress  Breath sounds: Normal breath sounds  No wheezing, rhonchi or rales  Musculoskeletal:      Cervical back: Normal range of motion and neck supple  Right lower leg: No edema  Left lower leg: No edema  Lymphadenopathy:      Cervical: No cervical adenopathy  Neurological:      Mental Status: He is alert     Psychiatric:         Mood and Affect: Mood normal        Lalo Mackey DO

## 2023-03-02 ENCOUNTER — TELEPHONE (OUTPATIENT)
Dept: FAMILY MEDICINE CLINIC | Facility: CLINIC | Age: 58
End: 2023-03-02

## 2023-03-02 DIAGNOSIS — J18.9 COMMUNITY ACQUIRED PNEUMONIA, UNSPECIFIED LATERALITY: Primary | ICD-10-CM

## 2023-03-02 RX ORDER — AZITHROMYCIN 250 MG/1
TABLET, FILM COATED ORAL
Qty: 6 TABLET | Refills: 0 | Status: SHIPPED | OUTPATIENT
Start: 2023-03-02 | End: 2023-03-06

## 2023-03-02 NOTE — TELEPHONE ENCOUNTER
So glad to hear he is doing better  Still think the pain in his shoulder/back could just be referred pain from resolving infection or musculoskeletal from coughing but I would like to be on safe side and give him rx for zithromax which would cover any atypical pathogens that the augmentin (preferred drug) may not cover  It is taken for 5 days   rx sent

## 2023-03-02 NOTE — TELEPHONE ENCOUNTER
I left a VM advising pt that Dr Martin Miranda wishes that he use the new antibiotic rx that was just sent to the pharmacy  Pt advised to call the office for a detailed explanation/advice from Dr Salazar Ours, as she feels that his pain is caused by lingering infection from the bilateral pneumonia  Awaiting call back

## 2023-03-02 NOTE — TELEPHONE ENCOUNTER
Please call patient to find out how he is feeling today  Specifically wondering if he is still experiencing the left shoulder/upper back pain he mentioned yesterday  Breathing? Cough? Fever?

## 2023-03-02 NOTE — TELEPHONE ENCOUNTER
1st telephonic attempt -    I left a VM advising pt to call the office to give Dr Duenas Cancel a health update regarding recent sx  Awaiting call back

## 2023-03-02 NOTE — TELEPHONE ENCOUNTER
Patient Sx Update -    Patient stated that he feels better overall  Pt stated that he has just a mild cough, sometimes productive  Patient stated that he only has shoulder and back pain when laying down, but feels fine while working  Patient denies fever  Pt aware to call back early  next week to give an update

## 2023-03-03 NOTE — TELEPHONE ENCOUNTER
Contacted patient and left vm: to reach out if needed       Pt left vm on line:Good morning  This is round Maikol Phan giving you a call back  I did receive your messages and did  the antibiotic last night, so feel free to give me a call back on my cell  I should be free for a couple hours yet  OK  Thank you  Have a good day, [phone]

## 2023-03-08 ENCOUNTER — TELEPHONE (OUTPATIENT)
Dept: FAMILY MEDICINE CLINIC | Facility: CLINIC | Age: 58
End: 2023-03-08

## 2023-03-08 ENCOUNTER — HOSPITAL ENCOUNTER (OUTPATIENT)
Dept: RADIOLOGY | Age: 58
Discharge: HOME | End: 2023-03-08

## 2023-03-08 DIAGNOSIS — M25.512 LEFT SHOULDER PAIN, UNSPECIFIED CHRONICITY: ICD-10-CM

## 2023-03-08 DIAGNOSIS — J18.9 PNEUMONIA OF BOTH LOWER LOBES DUE TO INFECTIOUS ORGANISM: ICD-10-CM

## 2023-03-08 NOTE — TELEPHONE ENCOUNTER
Results -    Pt aware of his test results and advice      Pt aware to call to schedule echocardiogram

## 2023-03-15 ENCOUNTER — CONSULT (OUTPATIENT)
Dept: PULMONOLOGY | Facility: CLINIC | Age: 58
End: 2023-03-15

## 2023-03-15 VITALS
WEIGHT: 266.6 LBS | DIASTOLIC BLOOD PRESSURE: 80 MMHG | OXYGEN SATURATION: 97 % | HEIGHT: 69 IN | BODY MASS INDEX: 39.49 KG/M2 | TEMPERATURE: 96.5 F | HEART RATE: 75 BPM | SYSTOLIC BLOOD PRESSURE: 144 MMHG | RESPIRATION RATE: 16 BRPM

## 2023-03-15 DIAGNOSIS — J90 PLEURAL EFFUSION: Primary | ICD-10-CM

## 2023-03-15 NOTE — PROGRESS NOTES
Pulmonary Initial Visit  Carlyle Stager 62 y o  male MRN: [id number]  @ Encounter: [id number]      Impressions/Recommendations:   Patient is a 59-year-old male presenting to establish pulmonary care in setting of chronic cough  The patient reports he was in his usual state of health until December 2022 when he developed a cough  It is possible that this cough was likely viral related given occurrences at that time  His cough persisted until January when he was thought to have pneumonia and started on multiple courses of antibiotics  He did have a CT scan in the end of February noted to have consolidation of the left base  He was treated with additional antibiotics at that time and imaging approximately 2 weeks later demonstrated a pleural effusion as well as pericardial effusion  Of note now, the patient does have some dyspnea for which she has an upcoming echo scheduled  The etiology of the initial cough is likely viral related  The subsequent cough was then noted to bacterial which was consistent with imaging as well as blood work  He has had overall improvement notes his cough is better now than it was several weeks ago  Given the improvement, we will not have any new medications at this time  No indication for additional blood work at this time  We will await results of echocardiogram and recheck PA lateral chest x-ray in approximately 4 weeks to evaluate persistence or resolution of effusion  Pleural effusion  -  Check PA/LAT CXR in 4 weeks    Pericardial effusion  -  F/u echo    Chronic cough  -  May discontinue advair and use albuterol PRN  -  May use cough medicine on PRN basis    Patient may follow up in 6 weeks or sooner as necessary  Orders Placed This Encounter   Procedures   • XR chest pa & lateral     Standing Status:   Future     Standing Expiration Date:   3/15/2027     Scheduling Instructions:      Bring along any outside films relating to this procedure             History of Present Illness   HPI:  Lela Patel is a 62 y o  male with pmhx sig for hx of testicular ca, melanoma who presents to establish pulmonary care in setting of chronic cough  The patient notes his cough began in about early December  He denied associated fevers, chills  He has no associated nausea, vomiting and had no associated shortness of breath  He trialed robitussin, mucinex DM  After his pneumonia, the patient had shoulder pain  The shoulder pain has resolved  There is slight pain in his back  He primarily noticed this at night  He reports his cough is currently mild  It is better than it was 3 weeks ago but similar to where it was in December  It is currently productive of phlegm  The patient had an Advair diskus which did not help his cough but caused thrush  He is a never smoker  He is currently a  with recent MCC from [place]  He does have second hand smoke exposure  He denies new medications prior to the cough  There has bene no significant benefit from tussionex  The patient had a medrol dose pack without any difference  There was no benefit from tessalon perles  He has received courses of doxycyline, augmentin and azithromycin  There is some mild benefit from zyrtec and flonase  He had testicular cancer in early 35s  He has a history of melanoma in 2017/2018  He denies allergies  The patient reports normal bowel/bladder habits  He reports significant fatigue  He denies significant exposures  Review of systems:  Review of systems was completed and was otherwise negative except as listed in HPI  Historical Information   Past Medical History:   Diagnosis Date   • Cancer [facility]) 2018   • Colon polyp    • COVID-19 12/2020   • GERD (gastroesophageal reflux disease)    • HL (hearing loss) 1974    Tumor   • Melanoma ([facility] [address] ) 2017    Dermatology and Moh's surgery in [facility]; Right lateral upper back • Pneumonia 2/21/2023    I have had a cough since early Deecember   • Testicular cancer [facility]) 2000    Dr Janet Falcon and [facility] (urology)     Past Surgical History:   Procedure Laterality Date   • COLONOSCOPY      January 2021 small cecal serrated adenoma, sigmoid diverticulosis, internal hemorrhoids   • ORCHIECTOMY Right    • SKIN CANCER EXCISION     • TUMOR EXCISION      right ear as a child   • UPPER GASTROINTESTINAL ENDOSCOPY      January 2021: Schatzki ring biopsies negative for Johnson's or EOE, fundic gland polyps, mild gastritis negative for H pylori  September 2014 Schatzki ring biopsies negative for Johnson's EOE or H pylori  Family History   Problem Relation Age of Onset   • Diabetes Mother    • Hypertension Mother    • Hypertension Father    • Heart disease Father    • Heart failure Father         Congestive   • No Known Problems Sister    • No Known Problems Brother    • No Known Problems Brother    • Colon polyps Neg Hx    • Colon cancer Neg Hx        Social History     Socioeconomic History   • Marital status: /Civil Union     Spouse name: None   • Number of children: None   • Years of education: None   • Highest education level: None   Occupational History   • None   Tobacco Use   • Smoking status: Never   • Smokeless tobacco: Never   Vaping Use   • Vaping Use: Never used   Substance and Sexual Activity   • Alcohol use:  Yes     Alcohol/week: 1 0 standard drink     Types: 1 Cans of beer per week     Comment: socially   • Drug use: Never   • Sexual activity: Yes     Partners: Female     Birth control/protection: None   Other Topics Concern   • None   Social History Narrative    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[place]        2 children    Likes to Syed Mcguire [address] Strain: Not on [employer] Insecurity: Not on file   Transportation Needs: Not on file   Physical Activity: Not on file   Stress: Not on file   Social Connections: Not on file   Intimate Partner Violence: Not on file   Housing Stability: Not on file       Meds/Allergies   No current facility-administered medications for this visit  (Not in a hospital admission)    No Known Allergies    Vitals: Blood pressure 144/80, pulse 75, temperature (!) 96 5 °F (35 8 °C), temperature source Tympanic, resp  rate 16, height 5' 9" (1 753 m), weight 121 kg (266 lb 9 6 oz), SpO2 97 % , RA, Body mass index is 39 37 kg/m²  Physical Exam  General: Pleasant, Awake alert and oriented x 3, conversant without conversational dyspnea, NAD, normal affect  HEENT:  PERRL, Sclera noninjected, nonicteric OU, Nares patent, no nasal flaring, no nasal drainage, Mucous membranes, moist, no oral lesions, normal dentition  NECK: Trachea midline, no accessory muscle use, no stridor, no cervical or supraclavicular adenopathy, JVP not elevated  CARDIAC: Reg, single s1/S2, no m/r/g  PULM: decreased breath sound at left base  CHEST: No gross deformities, equal chest expansion on inspiration bilaterally  ABD: Normoactive bowel sounds, soft nontender, nondistended, no rebound, no rigidity, no guarding  EXT: No cyanosis, no clubbing, no edema, normal capillary refill  SKIN:  No rashes, no lesions  NEURO: no focal neurologic deficits, AAOx3, moving all extremities appropriately    Labs: I have personally reviewed pertinent lab results  Lab Results   Component Value Date    SODIUM 134 (L) 02/21/2023    K 3 9 02/21/2023     02/21/2023    CO2 26 02/21/2023    BUN 10 02/21/2023    CREATININE 0 85 02/21/2023    GLUC 122 02/21/2023    CALCIUM 8 2 (L) 02/21/2023     Lab Results   Component Value Date    WBC 19 09 (H) 02/21/2023    HGB 13 5 02/21/2023    HCT 39 4 02/21/2023    MCV 88 02/21/2023     02/21/2023     Imaging and other studies: I have personally reviewed pertinent reports     and I have personally reviewed pertinent films in PACS  3/8/2023:  LUNGS:  Left lower lobe compressive atelectasis  No consolidation  Patent central airways  PLEURA:  Small, new left pleural effusion  HEART/GREAT VESSELS:  Trace, new anterior pericardial effusion  Normal heart size and aortic caliber  Similar mild atherosclerosis  MEDIASTINUM AND CHRISTA:  Within normal limits  CHEST WALL AND LOWER NECK:   Mild gynecomastia  VISUALIZED STRUCTURES IN THE UPPER ABDOMEN:  Within normal limits  OSSEOUS STRUCTURES:  Degenerative changes    Pulmonary function testing:    No pulmonary function testing available for review  Echocardiogram:   Pending    EKG, Pathology, and Other Studies: I have personally reviewed pertinent reports  and I have personally reviewed pertinent films in PACS  12/16/2020:  Normal sinus rhythm w/ left axis deviation  Socrates Haas

## 2023-03-24 ENCOUNTER — HOSPITAL ENCOUNTER (OUTPATIENT)
Dept: NON INVASIVE DIAGNOSTICS | Facility: HOSPITAL | Age: 58
Discharge: HOME | End: 2023-03-24

## 2023-03-24 VITALS
BODY MASS INDEX: 39.4 KG/M2 | SYSTOLIC BLOOD PRESSURE: 144 MMHG | WEIGHT: 266 LBS | DIASTOLIC BLOOD PRESSURE: 80 MMHG | HEART RATE: 64 BPM | HEIGHT: 69 IN

## 2023-03-24 DIAGNOSIS — I31.39 PERICARDIAL EFFUSION: ICD-10-CM

## 2023-03-24 LAB
AORTIC ROOT: 4.1 CM
AORTIC VALVE MEAN VELOCITY: 7.7 M/S
APICAL FOUR CHAMBER EJECTION FRACTION: 64 %
AV LVOT MEAN GRADIENT: 2 MMHG
AV LVOT PEAK GRADIENT: 3 MMHG
AV MEAN GRADIENT: 3 MMHG
AV PEAK GRADIENT: 5 MMHG
DOP CALC AO VTI: 23.7 CM
DOP CALC LVOT PEAK VEL VTI: 18 CM
DOP CALC LVOT PEAK VEL: 0.86 M/S
DOP CALC MV VTI: 22.6 CM
E WAVE DECELERATION TIME: 274 MS
E/A RATIO: 1.18
FRACTIONAL SHORTENING: 36 (ref 28–44)
INTERVENTRICULAR SEPTUM IN DIASTOLE (PARASTERNAL SHORT AXIS VIEW): 0.9 CM
INTERVENTRICULAR SEPTUM: 0.9 CM (ref 0.6–1.1)
LA/AORTA RATIO 2D: 1
LAAS-AP2: 19.3 CM2
LAAS-AP4: 16.2 CM2
LEFT ATRIUM SIZE: 4.1 CM
LEFT ATRIUM VOLUME INDEX (MOD BIPLANE): 22.7
LEFT INTERNAL DIMENSION IN SYSTOLE: 3.4 CM (ref 2.1–4)
LEFT VENTRICLE DIASTOLIC VOLUME (MOD BIPLANE): 121 ML
LEFT VENTRICLE SYSTOLIC VOLUME (MOD BIPLANE): 48 ML
LEFT VENTRICULAR INTERNAL DIMENSION IN DIASTOLE: 5.3 CM (ref 3.5–6)
LEFT VENTRICULAR POSTERIOR WALL IN END DIASTOLE: 0.9 CM
LEFT VENTRICULAR STROKE VOLUME: 86 ML
LV EF: 60 %
LVSV (TEICH): 86 ML
MV E'TISSUE VEL-LAT: 11 CM/S
MV E'TISSUE VEL-SEP: 10 CM/S
MV MEAN GRADIENT: 1 MMHG
MV PEAK A VEL: 0.61 M/S
MV PEAK E VEL: 72 CM/S
MV PEAK GRADIENT: 3 MMHG
MV STENOSIS PRESSURE HALF TIME: 80 MS
MV VALVE AREA P 1/2 METHOD: 2.8
SL CV LV EF: 60
SL CV PED ECHO LEFT VENTRICLE DIASTOLIC VOLUME (MOD BIPLANE) 2D: 134 ML
SL CV PED ECHO LEFT VENTRICLE SYSTOLIC VOLUME (MOD BIPLANE) 2D: 48 ML
TRICUSPID ANNULAR PLANE SYSTOLIC EXCURSION: 2.4 CM

## 2023-03-27 PROBLEM — I77.810 AORTIC ROOT DILATION (HCC): Status: ACTIVE | Noted: 2023-03-27

## 2023-04-25 ENCOUNTER — OFFICE VISIT (OUTPATIENT)
Dept: FAMILY MEDICINE CLINIC | Facility: CLINIC | Age: 58
End: 2023-04-25

## 2023-04-25 VITALS
DIASTOLIC BLOOD PRESSURE: 84 MMHG | BODY MASS INDEX: 40.2 KG/M2 | WEIGHT: 271.4 LBS | SYSTOLIC BLOOD PRESSURE: 154 MMHG | HEIGHT: 69 IN | TEMPERATURE: 97.8 F | OXYGEN SATURATION: 97 % | HEART RATE: 74 BPM

## 2023-04-25 DIAGNOSIS — I10 ESSENTIAL HYPERTENSION: ICD-10-CM

## 2023-04-25 DIAGNOSIS — S83.91XA SPRAIN OF RIGHT KNEE, UNSPECIFIED LIGAMENT, INITIAL ENCOUNTER: Primary | ICD-10-CM

## 2023-04-25 RX ORDER — LISINOPRIL 10 MG/1
10 TABLET ORAL DAILY
Qty: 30 TABLET | Refills: 1 | Status: SHIPPED | OUTPATIENT
Start: 2023-04-25

## 2023-04-25 RX ORDER — MELOXICAM 7.5 MG/1
7.5 TABLET ORAL DAILY
Qty: 30 TABLET | Refills: 1 | Status: SHIPPED | OUTPATIENT
Start: 2023-04-25

## 2023-04-25 NOTE — PROGRESS NOTES
Name: Alvin Obrien      : 1965      MRN: [id number]  Encounter Provider: Isauro Lagunas DO  Encounter Date: 2023   Encounter department: [address] [place] [address]     1  Sprain of right knee, unspecified ligament, initial encounter  -     meloxicam (Mobic) 7 5 mg tablet; Take 1 tablet (7 5 mg total) by mouth daily  Ice, meloxicam   Recheck after his vacation  May need PT    2  Essential hypertension  -     lisinopril (ZESTRIL) 10 mg tablet; Take 1 tablet (10 mg total) by mouth daily  -     Basic metabolic panel; Future  bp elevated in office today and in review of chart, appears that it has been elevated at recent visits to specialists as well  Patient to follow DASH diet  Start lisinopril 10 mg once daily  Discussed potential side effects  Get bmp in 1 month prior to f/u appt for bp recheck  Depression Screening and Follow-up Plan: Patient was screened for depression during today's encounter  They screened negative with a PHQ-2 score of 0  Subjective     HPI   Patient is a 62year old male who is being seen today for complaint of right knee pain  Pain started 4 days ago when he got out of bed  States that he was putting a hot tub together the day prior  Does not recall any specific injury but wonders if he might have twisted wrong  No issues with this knee in the past      He states that pain is made worse after he is sitting and goes to get up and walk  Once up and walking feels better  No issue with stairs  No swelling  Using ice and heat  Ice feels better  Is traveling to [facility] for vacation later this week  Wanted knee checked out before the trip  Review of Systems    Past Medical History:   Diagnosis Date   • Cancer [facility]) 2018   • Colon polyp    • COVID-19 2020   • GERD (gastroesophageal reflux disease)    • HL (hearing loss) 1974    Tumor   • Melanoma ([facility] [address] ) 2017    Dermatology and [facility]'s surgery in [facility]; Right lateral upper back   • Pneumonia 2/21/2023    I have had a cough since early Deecember   • Testicular cancer [facility]) 2000    Dr Sanjay Molina and [facility] (urology)     Past Surgical History:   Procedure Laterality Date   • COLONOSCOPY      January 2021 small cecal serrated adenoma, sigmoid diverticulosis, internal hemorrhoids   • ORCHIECTOMY Right    • SKIN CANCER EXCISION     • TUMOR EXCISION      right ear as a child   • UPPER GASTROINTESTINAL ENDOSCOPY      January 2021: Schatzki ring biopsies negative for Johnson's or EOE, fundic gland polyps, mild gastritis negative for H pylori  September 2014 Schatzki ring biopsies negative for Johnson's EOE or H pylori  Family History   Problem Relation Age of Onset   • Diabetes Mother    • Hypertension Mother    • Hypertension Father    • Heart disease Father    • Heart failure Father         Congestive   • No Known Problems Sister    • No Known Problems Brother    • No Known Problems Brother    • Colon polyps Neg Hx    • Colon cancer Neg Hx      Social History     Socioeconomic History   • Marital status: /Civil Union     Spouse name: None   • Number of children: None   • Years of education: None   • Highest education level: None   Occupational History   • None   Tobacco Use   • Smoking status: Never   • Smokeless tobacco: Never   Vaping Use   • Vaping Use: Never used   Substance and Sexual Activity   • Alcohol use:  Yes     Alcohol/week: 1 0 standard drink     Types: 1 Cans of beer per week     Comment: socially   • Drug use: Never   • Sexual activity: Yes     Partners: Female     Birth control/protection: None   Other Topics Concern   • None   Social History Narrative    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 [place]        2 children    Likes to Syed Mcgu[address] Strain: Not on [employer] Insecurity: Not on file   Transportation Needs: Not on file   Physical Activity: Not on "file   Stress: Not on file   Social Connections: Not on file   Intimate Partner Violence: Not on file   Housing Stability: Not on file     Current Outpatient Medications on File Prior to Visit   Medication Sig   • albuterol (Ventolin HFA) 90 mcg/act inhaler Inhale 2 puffs every 4 (four) hours as needed for wheezing or shortness of breath   • ketoconazole (NIZORAL) 2 % cream Apply 2x/day to bottom of feet and in between toes for 3 weeks  Apply on facial redness/rash as needed  (Patient taking differently: as needed Apply 2x/day to bottom of feet and in between toes for 3 weeks  Apply on facial redness/rash as needed )   • Multiple Vitamins-Minerals (MULTIVITAMIN ADULTS PO) Take 1 capsule by mouth daily   • RABEprazole (ACIPHEX) 20 MG tablet Take 1 tablet (20 mg total) by mouth daily   • triamcinolone (KENALOG) 0 025 % cream Apply topically 2 (two) times a day Apply sparingly 2-4 times a day for up to one week   (Patient taking differently: Apply topically as needed Apply sparingly 2-4 times a day for up to one week )   • [DISCONTINUED] Hydrocod Fazal-Chlorphe Fazal ER (TUSSIONEX) 10-8 mg/5 mL ER suspension Take 5 mL by mouth every 12 (twelve) hours as needed for cough Max Daily Amount: 10 mL (Patient not taking: Reported on 4/25/2023)   • [DISCONTINUED] nystatin (MYCOSTATIN) 500,000 units/5 mL suspension Apply 5 mL (500,000 Units total) to the mouth or throat 4 (four) times a day (Patient not taking: Reported on 4/25/2023)     No Known Allergies  Immunization History   Administered Date(s) Administered   • COVID-19 PFIZER VACCINE 0 3 ML IM 03/18/2021, 04/12/2021, 12/06/2021   • INFLUENZA 10/20/2022   • Influenza Quadrivalent Recombinant Preservative Free IM 10/28/2021   • Tdap 01/14/2022       Objective     /84 (BP Location: Left arm, Patient Position: Sitting, Cuff Size: Large)   Pulse 74   Temp 97 8 °F (36 6 °C) (Tympanic)   Ht 5' 9\" (1 753 m)   Wt 123 kg (271 lb 6 4 oz)   SpO2 97%   BMI 40 08 kg/m² " Physical Exam  Vitals and nursing note reviewed  Constitutional:       General: He is not in acute distress  Appearance: Normal appearance  He is obese  He is not ill-appearing, toxic-appearing or diaphoretic  Eyes:      Conjunctiva/sclera: Conjunctivae normal    Cardiovascular:      Rate and Rhythm: Normal rate and regular rhythm  Heart sounds: No murmur heard  Pulmonary:      Effort: Pulmonary effort is normal       Breath sounds: Normal breath sounds  Musculoskeletal:      Cervical back: Normal range of motion and neck supple  Right lower leg: No edema  Left lower leg: No edema  Comments: Right knee with mild effusion  No deformity  No tenderness over medial or lateral joint lines  Negative lachman's  + patellar inhibition  Equivocal mcmurrays   Lymphadenopathy:      Cervical: No cervical adenopathy  Skin:     Comments: Right lower leg with linear area of erythema from previous rash  No vesicles or open wound  Left posterior knee and calf with erythematous maculopapular eruption   Neurological:      General: No focal deficit present  Mental Status: He is alert     Psychiatric:         Mood and Affect: Mood normal        Richa Tamez DO

## 2023-04-25 NOTE — PATIENT INSTRUCTIONS
Start lisinopril 10 mg once daily  Take meloxicam 7 5 mg once daily for pain and inflammation in right knee  Get bmp prior to next visit in 1 month  Knee Sprain   WHAT YOU NEED TO KNOW:   A knee sprain is a stretched or torn ligament in your knee  Ligaments support the knee and keep the joint and bones in the correct position  A knee sprain may involve one or more ligaments  DISCHARGE INSTRUCTIONS:   Return to the emergency department if:   Any part of your leg feels cold, numb, or looks pale  Call your doctor if:   You have new or increased swelling, bruising, or pain in your knee  Your symptoms do not improve within 6 weeks, even with treatment  You have questions or concerns about your condition or care  Medicines:   NSAIDs , such as ibuprofen, help decrease swelling, pain, and fever  This medicine is available with or without a doctor's order  NSAIDs can cause stomach bleeding or kidney problems in certain people  If you take blood thinner medicine, always ask your healthcare provider if NSAIDs are safe for you  Always read the medicine label and follow directions  Acetaminophen  decreases pain and fever  It is available without a doctor's order  Ask how much to take and how often to take it  Follow directions  Read the labels of all other medicines you are using to see if they also contain acetaminophen, or ask your doctor or pharmacist  Acetaminophen can cause liver damage if not taken correctly  Prescription pain medicine  may be given  Ask your healthcare provider how to take this medicine safely  Some prescription pain medicines contain acetaminophen  Do not take other medicines that contain acetaminophen without talking to your healthcare provider  Too much acetaminophen may cause liver damage  Prescription pain medicine may cause constipation  Ask your healthcare provider how to prevent or treat constipation  Take your medicine as directed    Contact your healthcare provider if you think your medicine is not helping or if you have side effects  Tell your provider if you are allergic to any medicine  Keep a list of the medicines, vitamins, and herbs you take  Include the amounts, and when and why you take them  Bring the list or the pill bottles to follow-up visits  Carry your medicine list with you in case of an emergency  A support device  such as a splint or brace may be needed  These devices limit movement and protect the joint while it heals  You may be given crutches to use until you can stand on your injured leg without pain  Physical therapy  may be needed  A physical therapist teaches you exercises to help improve movement and strength, and to decrease pain  Manage a knee sprain:   Rest  your knee and do not exercise  Do not walk on your injured leg if you are told to keep weight off your knee  Rest helps decrease swelling and allows the injury to heal  You can do gentle range of motion exercises as directed to prevent stiffness  Apply ice  on your knee for 15 to 20 minutes every hour or as directed  Use an ice pack, or put crushed ice in a plastic bag  Cover the bag with a towel before you apply it  Ice helps prevent tissue damage and decreases swelling and pain  Apply compression  to your knee as directed  You may need to wear an elastic bandage  This helps keep your injured knee from moving too much while it heals  It should be tight enough to give support but so tight that it causes your toes to feel numb or tingly  Take the bandage off and rewrap it at least 1 time each day  Elevate your knee  above the level of your heart as often as you can  This will help decrease swelling and pain  Prop your leg on pillows or blankets to keep it elevated comfortably  Do not put pillows directly behind your knee  Prevent another knee sprain:  Exercise your legs to keep your muscles strong  Strong leg muscles help protect your knee and prevent strain   The following may also prevent a knee sprain:  Slowly start your exercise or training program   Slowly increase the time, distance, and intensity of your exercise  Sudden increases in training may cause another knee sprain  Wear protective braces and equipment as directed  Braces may prevent your knee from moving the wrong way and causing another sprain  Protective equipment may support your bones and ligaments to prevent injury  Warm up and stretch before exercise  Warm up by walking or using an exercise bike before starting your regular exercise  Do gentle stretches after warming up  This helps to loosen your muscles and decrease stress on your knee  Cool down and stretch after you exercise  Wear shoes that fit correctly and support your feet  Replace your running or exercise shoes before the padding or shock absorption is worn out  Ask your healthcare provider which exercise shoes are best for you  Ask if you should wear shoe inserts  Shoe inserts can help support your heels and arches or keep your foot lined up correctly in your shoes  Exercise on flat surfaces  Follow up with your doctor as directed:  Write down your questions so you remember to ask them during your visits  © Copyright Nicole Donahue 2022 Information is for End User's use only and may not be sold, redistributed or otherwise used for commercial purposes  The above information is an  only  It is not intended as medical advice for individual conditions or treatments  Talk to your doctor, nurse or pharmacist before following any medical regimen to see if it is safe and effective for you

## 2023-05-17 ENCOUNTER — OFFICE VISIT (OUTPATIENT)
Dept: PULMONOLOGY | Facility: CLINIC | Age: 58
End: 2023-05-17

## 2023-05-17 VITALS
RESPIRATION RATE: 16 BRPM | OXYGEN SATURATION: 97 % | BODY MASS INDEX: 39.55 KG/M2 | TEMPERATURE: 98 F | SYSTOLIC BLOOD PRESSURE: 130 MMHG | HEIGHT: 69 IN | WEIGHT: 267 LBS | HEART RATE: 76 BPM | DIASTOLIC BLOOD PRESSURE: 78 MMHG

## 2023-05-17 DIAGNOSIS — R05.3 CHRONIC COUGH: ICD-10-CM

## 2023-05-17 DIAGNOSIS — I10 ESSENTIAL HYPERTENSION: ICD-10-CM

## 2023-05-17 DIAGNOSIS — R09.82 POST-NASAL DRIP: ICD-10-CM

## 2023-05-17 DIAGNOSIS — I10 PRIMARY HYPERTENSION: Primary | ICD-10-CM

## 2023-05-17 RX ORDER — LISINOPRIL 10 MG/1
TABLET ORAL
Qty: 90 TABLET | Refills: 1 | Status: SHIPPED | OUTPATIENT
Start: 2023-05-17 | End: 2023-05-25 | Stop reason: SDUPTHER

## 2023-05-17 NOTE — PROGRESS NOTES
Progress Note - Pulmonary   Piyush Omer 62 y o  male MRN: [id number]   Encounter: [id number]      Assessment/Plan:  Patient is a 55-year-old male presenting for routine follow-up  On the previous visit, patient was noted to have consolidations as well as pericardial effusion both of which have resolved on chest x-ray and echocardiogram respectively  The patient had a good period of breathing but notes his cough has returned recently most likely in the setting of allergies  Recommend the patient use a saline nasal rinse as well as Flonase  Pleural effusion  -  Resolved on CXR     Pericardial effusion  -  Resolved on echo     Chronic cough  -  Concern for post nasal drip  -  Start flonase and saline irrigation    HTN  -  Recently started on lisinopril  -  Unlikely cause of chronic cough       1  Primary hypertension    2  Post-nasal drip    3  Chronic cough    Patient may follow up as needed    Orders:  No orders of the defined types were placed in this encounter  Subjective: The patient notes that his breathing was doing better but than had returned  He notes an increase in phlegm which started about 2 weeks ago  He is taking zyrtec at night  He denies any fevers, chills, nausea or vomiting  He has only a mild cough in the AM   He notes sinus congestion  He is not taking any nasal spray/nasal rinse  He is not taking any inhalers or cough medicine  He denies any shortness of breath        Inhaler Regimen:  None    Answers for HPI/ROS submitted by the patient on 5/15/2023  Do you have a cough?: Yes  Chronicity: recurrent  When did you first notice your symptoms?: more than 1 month ago  How often do your symptoms occur?: daily  Since you first noticed this problem, how has it changed?: unchanged  Have you had a change in appetite?: No  Do you have chest pain?: No  Do you have shortness of breath that occurs with effort or exertion?: Yes  Do you have ear congestion?: No  Do you have ear "pain?: No  Do you have a fever?: No  Do you have headaches?: No  Do you have heartburn?: Yes  Do you have fatigue?: No  Do you have muscle pain?: No  Do you have nasal congestion?: No  Do you have shortness of breath when lying flat?: No  Do you have shortness of breath when you wake up?: No  Do you have post-nasal drip?: Yes  Do you have a runny nose?: No  Do you have sneezing?: No  Do you have a sore throat?: No  Do you have sweats?: No  Do you have trouble swallowing?: No  Have you experienced weight loss?: No  Which of the following makes your symptoms worse?: nothing  Which of the following makes your symptoms better?: nothing    Remainder of review of systems negative except as described in HPI  The following portions of the patient's history were reviewed and updated as appropriate: allergies, current medications, past family history, past medical history, past social history, past surgical history and problem list      Objective:   Vitals: Blood pressure 130/78, pulse 76, temperature 98 °F (36 7 °C), temperature source Tympanic, resp  rate 16, height 5' 9\" (1 753 m), weight 121 kg (267 lb), SpO2 97 % , RA, Body mass index is 39 43 kg/m²  Physical Exam  Gen: Pleasant, awake, alert, oriented x 3, no acute distress  HEENT: Mucous membranes moist, no oral lesions, no thrush  NECK: No accessory muscle use, JVP not elevated  Cardiac: RRR, single S1, single S2, no murmurs, no rubs, no gallops  Lungs: CTA b/l  Abdomen: normoactive bowel sounds, soft nontender, nondistended, no rebound or rigidity, no guarding  Extremities: no cyanosis, no clubbing, no LE edema  MSK:  Strength equal in all extremities  Derm:  No rashes/lesions noted  Neuro:  Appropriate mood/affect    Labs: I have personally reviewed pertinent lab results    Lab Results   Component Value Date    WBC 19 09 (H) 02/21/2023    HGB 13 5 02/21/2023     02/21/2023     Lab Results   Component Value Date    CREATININE 0 85 02/21/2023      " Imaging and other studies: I have personally reviewed pertinent reports  and I have personally reviewed pertinent films in PACS  CXR 4/10/2023  My interpretation:  No acute intrathoracic pathology    Radiology findings:  Cardiomediastinal silhouette normal   Lungs clear  No effusion or pneumothorax  Upper abdomen normal   Bones normal for age  Pulmonary Function Testing:   No pulmonary function testing available for review  Socrates Mejia

## 2023-05-24 ENCOUNTER — APPOINTMENT (OUTPATIENT)
Dept: LAB | Facility: HOSPITAL | Age: 58
End: 2023-05-24

## 2023-05-24 DIAGNOSIS — I10 ESSENTIAL HYPERTENSION: ICD-10-CM

## 2023-05-24 LAB
ANION GAP SERPL CALCULATED.3IONS-SCNC: 4 MMOL/L (ref 4–13)
BUN SERPL-MCNC: 18 MG/DL (ref 5–25)
CALCIUM SERPL-MCNC: 9.1 MG/DL (ref 8.4–10.2)
CHLORIDE SERPL-SCNC: 107 MMOL/L (ref 96–108)
CO2 SERPL-SCNC: 28 MMOL/L (ref 21–32)
CREAT SERPL-MCNC: 0.85 MG/DL (ref 0.6–1.3)
GFR SERPL CREATININE-BSD FRML MDRD: 96 ML/MIN/1.73SQ M
GLUCOSE P FAST SERPL-MCNC: 82 MG/DL (ref 65–99)
POTASSIUM SERPL-SCNC: 4.5 MMOL/L (ref 3.5–5.3)
SODIUM SERPL-SCNC: 139 MMOL/L (ref 135–147)

## 2023-05-25 ENCOUNTER — OFFICE VISIT (OUTPATIENT)
Dept: FAMILY MEDICINE CLINIC | Facility: CLINIC | Age: 58
End: 2023-05-25

## 2023-05-25 VITALS
BODY MASS INDEX: 39.6 KG/M2 | OXYGEN SATURATION: 97 % | DIASTOLIC BLOOD PRESSURE: 82 MMHG | SYSTOLIC BLOOD PRESSURE: 134 MMHG | HEART RATE: 64 BPM | HEIGHT: 69 IN | WEIGHT: 267.4 LBS | TEMPERATURE: 97.3 F

## 2023-05-25 DIAGNOSIS — I10 ESSENTIAL HYPERTENSION: Primary | ICD-10-CM

## 2023-05-25 DIAGNOSIS — S83.91XD SPRAIN OF RIGHT KNEE, UNSPECIFIED LIGAMENT, SUBSEQUENT ENCOUNTER: ICD-10-CM

## 2023-05-25 RX ORDER — LISINOPRIL 10 MG/1
10 TABLET ORAL DAILY
Qty: 90 TABLET | Refills: 1 | Status: SHIPPED | OUTPATIENT
Start: 2023-05-25

## 2023-05-25 NOTE — PROGRESS NOTES
Name: Nick Tinsley      : 1965      MRN: [id number]  Encounter Provider: Conchita Farr DO  Encounter Date: 2023   Encounter department: [address] [place] [address]  Essential hypertension  bp control improved  Continue the lisinopril 10 mg once daily  2  Sprain of right knee, unspecified ligament, subsequent encounter  Improved but still with some pain at night  Can d/c the meloxicam  Consider PT if does not resolve entirely         Subjective     HPI     Patient is a 62year old male who is being seen today for f/u on his hypertension and knee pain  bp elevated at last visit in April  Was started on lisinopril 10 mg once daily  Knee is doing a lot better  No pain with walking  Still has pain at night when sleeping  No swelling  Has a few meloxicam left  Saw pulmonology for his cough  Was prescribed saline rinses, flonase and zyrte  Review of Systems    Past Medical History:   Diagnosis Date   • Cancer [facility])    • Colon polyp    • COVID-19 2020   • GERD (gastroesophageal reflux disease)    • HL (hearing loss) 1974    Tumor   • Melanoma ([facility] [address] ) 2017    Dermatology and Moh's surgery in [facility]; Right lateral upper back   • Pneumonia 2023    I have had a cough since early Deecember   • Testicular cancer [facility])     Dr Stefanie Chery and [facility] (urology)     Past Surgical History:   Procedure Laterality Date   • COLONOSCOPY      2021 small cecal serrated adenoma, sigmoid diverticulosis, internal hemorrhoids   • ORCHIECTOMY Right    • SKIN CANCER EXCISION     • TUMOR EXCISION      right ear as a child   • UPPER GASTROINTESTINAL ENDOSCOPY      2021: Schatzki ring biopsies negative for Johnson's or EOE, fundic gland polyps, mild gastritis negative for H pylori  2014 Schatzki ring biopsies negative for Johnson's EOE or H pylori       Family History   Problem Relation Age of Onset   • Diabetes Mother • Hypertension Mother    • Hypertension Father    • Heart disease Father    • Heart failure Father         Congestive   • No Known Problems Sister    • No Known Problems Brother    • No Known Problems Brother    • Colon polyps Neg Hx    • Colon cancer Neg Hx      Social History     Socioeconomic History   • Marital status: /Civil Union     Spouse name: None   • Number of children: None   • Years of education: None   • Highest education level: None   Occupational History   • None   Tobacco Use   • Smoking status: Never   • Smokeless tobacco: Never   Vaping Use   • Vaping Use: Never used   Substance and Sexual Activity   • Alcohol use: Yes     Alcohol/week: 1 0 standard drink of alcohol     Types: 1 Cans of beer per week     Comment: socially   • Drug use: Never   • Sexual activity: Yes     Partners: Female     Birth control/protection: None   Other Topics Concern   • None   Social History Narrative    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[place]        2 children    Likes to [address] Strain: Not on [employer] Insecurity: Not on file   Transportation Needs: Not on file   Physical Activity: Not on file   Stress: Not on file   Social Connections: Not on file   Intimate Partner Violence: Not on file   Housing Stability: Not on file     Current Outpatient Medications on File Prior to Visit   Medication Sig   • ketoconazole (NIZORAL) 2 % cream Apply 2x/day to bottom of feet and in between toes for 3 weeks  Apply on facial redness/rash as needed  (Patient taking differently: as needed Apply 2x/day to bottom of feet and in between toes for 3 weeks   Apply on facial redness/rash as needed )   • meloxicam (Mobic) 7 5 mg tablet Take 1 tablet (7 5 mg total) by mouth daily   • Multiple Vitamins-Minerals (MULTIVITAMIN ADULTS PO) Take 1 capsule by mouth daily   • RABEprazole (ACIPHEX) 20 MG tablet Take 1 tablet (20 mg total) by "mouth daily   • triamcinolone (KENALOG) 0 025 % cream Apply topically 2 (two) times a day Apply sparingly 2-4 times a day for up to one week  (Patient taking differently: Apply topically as needed Apply sparingly 2-4 times a day for up to one week )   • [DISCONTINUED] lisinopril (ZESTRIL) 10 mg tablet TAKE 1 TABLET BY MOUTH EVERY DAY   • [DISCONTINUED] albuterol (Ventolin HFA) 90 mcg/act inhaler Inhale 2 puffs every 4 (four) hours as needed for wheezing or shortness of breath (Patient not taking: Reported on 5/25/2023)     No Known Allergies  Immunization History   Administered Date(s) Administered   • COVID-19 PFIZER VACCINE 0 3 ML IM 03/18/2021, 04/12/2021, 12/06/2021   • INFLUENZA 10/20/2022   • Influenza Quadrivalent Recombinant Preservative Free IM 10/28/2021   • Tdap 01/14/2022       Objective     /82 (BP Location: Left arm, Patient Position: Sitting, Cuff Size: Large)   Pulse 64   Temp (!) 97 3 °F (36 3 °C) (Tympanic)   Ht 5' 9\" (1 753 m)   Wt 121 kg (267 lb 6 4 oz)   SpO2 97%   BMI 39 49 kg/m²     Physical Exam  Vitals and nursing note reviewed  Constitutional:       General: He is not in acute distress  Appearance: Normal appearance  He is obese  He is not ill-appearing, toxic-appearing or diaphoretic  HENT:      Head: Normocephalic and atraumatic  Cardiovascular:      Rate and Rhythm: Normal rate and regular rhythm  Heart sounds: No murmur heard  Pulmonary:      Effort: Pulmonary effort is normal       Breath sounds: Normal breath sounds  Musculoskeletal:      Cervical back: Normal range of motion  Right lower leg: No edema  Left lower leg: No edema  Comments: Right knee with no deformity or effusion  There is no tenderness on exam    Negative varus/valgus stress  Negative ida's   Neurological:      Mental Status: He is alert         Windy Dent, DO  "

## 2023-06-05 DIAGNOSIS — L21.9 SEBORRHEIC DERMATITIS: ICD-10-CM

## 2023-06-06 RX ORDER — KETOCONAZOLE 20 MG/G
CREAM TOPICAL
Qty: 60 G | Refills: 0 | Status: SHIPPED | OUTPATIENT
Start: 2023-06-06

## 2023-08-14 ENCOUNTER — APPOINTMENT (OUTPATIENT)
Dept: LAB | Facility: HOSPITAL | Age: 58
End: 2023-08-14

## 2023-08-14 DIAGNOSIS — Z00.8 HEALTH EXAMINATION IN POPULATION SURVEY: ICD-10-CM

## 2023-08-14 LAB
CHOLEST SERPL-MCNC: 181 MG/DL
EST. AVERAGE GLUCOSE BLD GHB EST-MCNC: 108 MG/DL
HBA1C MFR BLD: 5.4 %
HDLC SERPL-MCNC: 41 MG/DL
LDLC SERPL CALC-MCNC: 117 MG/DL (ref 0–100)
NONHDLC SERPL-MCNC: 140 MG/DL
TRIGL SERPL-MCNC: 117 MG/DL

## 2023-08-14 PROCEDURE — 36415 COLL VENOUS BLD VENIPUNCTURE: CPT

## 2023-08-14 PROCEDURE — 80061 LIPID PANEL: CPT

## 2023-08-14 PROCEDURE — 83036 HEMOGLOBIN GLYCOSYLATED A1C: CPT

## 2023-08-30 DIAGNOSIS — K21.9 GASTROESOPHAGEAL REFLUX DISEASE, UNSPECIFIED WHETHER ESOPHAGITIS PRESENT: ICD-10-CM

## 2023-08-30 DIAGNOSIS — I10 ESSENTIAL HYPERTENSION: ICD-10-CM

## 2023-08-31 RX ORDER — RABEPRAZOLE SODIUM 20 MG/1
20 TABLET, DELAYED RELEASE ORAL DAILY
Qty: 90 TABLET | Refills: 2 | Status: SHIPPED | OUTPATIENT
Start: 2023-08-31

## 2023-08-31 RX ORDER — LISINOPRIL 10 MG/1
10 TABLET ORAL DAILY
Qty: 90 TABLET | Refills: 0 | Status: SHIPPED | OUTPATIENT
Start: 2023-08-31

## 2023-11-28 ENCOUNTER — OFFICE VISIT (OUTPATIENT)
Dept: FAMILY MEDICINE CLINIC | Facility: CLINIC | Age: 58
End: 2023-11-28
Payer: COMMERCIAL

## 2023-11-28 VITALS
DIASTOLIC BLOOD PRESSURE: 70 MMHG | HEIGHT: 68 IN | BODY MASS INDEX: 41.22 KG/M2 | OXYGEN SATURATION: 100 % | SYSTOLIC BLOOD PRESSURE: 120 MMHG | WEIGHT: 272 LBS | RESPIRATION RATE: 18 BRPM | TEMPERATURE: 98.7 F | HEART RATE: 82 BPM

## 2023-11-28 DIAGNOSIS — Z12.5 PROSTATE CANCER SCREENING: ICD-10-CM

## 2023-11-28 DIAGNOSIS — J02.9 PHARYNGITIS, UNSPECIFIED ETIOLOGY: ICD-10-CM

## 2023-11-28 DIAGNOSIS — Z85.47 HISTORY OF TESTICULAR CANCER: ICD-10-CM

## 2023-11-28 DIAGNOSIS — N39.43 POST-VOID DRIBBLING: ICD-10-CM

## 2023-11-28 DIAGNOSIS — Z13.6 SCREENING FOR CARDIOVASCULAR CONDITION: ICD-10-CM

## 2023-11-28 DIAGNOSIS — K21.9 GASTROESOPHAGEAL REFLUX DISEASE, UNSPECIFIED WHETHER ESOPHAGITIS PRESENT: ICD-10-CM

## 2023-11-28 DIAGNOSIS — I77.810 AORTIC ROOT DILATION (HCC): ICD-10-CM

## 2023-11-28 DIAGNOSIS — Z13.1 SCREENING FOR DIABETES MELLITUS: ICD-10-CM

## 2023-11-28 DIAGNOSIS — R35.0 FREQUENCY OF MICTURITION: ICD-10-CM

## 2023-11-28 DIAGNOSIS — I10 ESSENTIAL HYPERTENSION: Primary | ICD-10-CM

## 2023-11-28 PROCEDURE — 99213 OFFICE O/P EST LOW 20 MIN: CPT | Performed by: FAMILY MEDICINE

## 2023-11-28 RX ORDER — LISINOPRIL 10 MG/1
10 TABLET ORAL DAILY
Qty: 90 TABLET | Refills: 1 | Status: SHIPPED | OUTPATIENT
Start: 2023-11-28

## 2023-11-28 NOTE — PROGRESS NOTES
Name: Yair Luis      : 1965      MRN: [id number]  Encounter Provider: Radha Humphreys DO  Encounter Date: 2023   Encounter department: [address] [place] [address]     1. Essential hypertension  Bp well controlled on lisinopril. BMP ordered. Refilled his lisinopril. Recheck bp 6 months. Continue to try and decrease salt in diet  2. Gastroesophageal reflux disease, unspecified whether esophagitis present  Stable. On no meds  3. Aortic root dilation (HCC)  Seen on echo done 3/2023. Will need repeat echo in march. Order placed today  4. History testicular cancer  5. Frequency of urination  Referral to [facility] urology per patient request    6. BMI 39.0-39.9,adult  Discussed diet and exercise. 7.  Pharyngitis  Viral   Recommend salt water gargles. Depression Screening and Follow-up Plan: Patient was screened for depression during today's encounter. They screened negative with a PHQ-2 score of 0. Subjective     HPI  Patient is a 62year old male with HTN, GERD, aortic root dilation, history testicular cancer who is being seen today for routine f/u visit. Had lipid panel and A1c done in August for insurance wellness. Trying to watch diet. Had stopped exercising due to some issues with his knee. Knee is doing better. Plans to start exercising again    Sore throat since last night. No other URI sx. Had both flu and covid vaccine this season at the [facility] [employer] [place]. Never saw urology as referred previously. Requests referral. Has history of testicular cancer. Some urinary frequency. Did not go for PSA screen as previously ordered. I re-ordered today and he was advised to get this done with labs prior to his PE in may. Seeing derm for skin cancer f/u.        Review of Systems    Past Medical History:   Diagnosis Date   • Cancer [facility]) 2018   • Colon polyp    • COVID-19 2020   • GERD (gastroesophageal reflux disease) • HL (hearing loss) 1974    Tumor   • Melanoma [facility]) 2017    Dermatology and Moh's surgery in [facility]. [place]; Right lateral upper back   • Pneumonia 2/21/2023    I have had a cough since early Deecember   • Testicular cancer [facility]) 2000    Dr Gerhardt Bombard and [facility] (urology)     Past Surgical History:   Procedure Laterality Date   • COLONOSCOPY      January 2021 small cecal serrated adenoma, sigmoid diverticulosis, internal hemorrhoids   • ORCHIECTOMY Right    • SKIN CANCER EXCISION     • TUMOR EXCISION      right ear as a child   • UPPER GASTROINTESTINAL ENDOSCOPY      January 2021: Schatzki ring biopsies negative for Johnson's or EOE, fundic gland polyps, mild gastritis negative for H pylori. September 2014 Schatzki ring biopsies negative for Johnson's EOE or H pylori. Family History   Problem Relation Age of Onset   • Diabetes Mother    • Hypertension Mother    • Hypertension Father    • Heart disease Father    • Heart failure Father         Congestive   • No Known Problems Sister    • No Known Problems Brother    • No Known Problems Brother    • Colon polyps Neg Hx    • Colon cancer Neg Hx      Social History     Socioeconomic History   • Marital status: /Civil Union     Spouse name: None   • Number of children: None   • Years of education: None   • Highest education level: None   Occupational History   • None   Tobacco Use   • Smoking status: Never   • Smokeless tobacco: Never   Vaping Use   • Vaping Use: Never used   Substance and Sexual Activity   • Alcohol use:  Yes     Alcohol/week: 1.0 standard drink of alcohol     Types: 1 Cans of beer per week     Comment: socially   • Drug use: Never   • Sexual activity: Yes     Partners: Female     Birth control/protection: None   Other Topics Concern   • None   Social History Narrative    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 Apply on facial redness/rash as needed. • Multiple Vitamins-Minerals (MULTIVITAMIN ADULTS PO) Take 1 capsule by mouth daily   • RABEprazole (ACIPHEX) 20 MG tablet Take 1 tablet (20 mg total) by mouth daily   • triamcinolone (KENALOG) 0.025 % cream Apply topically 2 (two) times a day Apply sparingly 2-4 times a day for up to one week. (Patient taking differently: Apply topically as needed Apply sparingly 2-4 times a day for up to one week.)   • [DISCONTINUED] lisinopril (ZESTRIL) 10 mg tablet Take 1 tablet (10 mg total) by mouth daily   • [DISCONTINUED] meloxicam (Mobic) 7.5 mg tablet Take 1 tablet (7.5 mg total) by mouth daily (Patient not taking: Reported on 11/28/2023)     No Known Allergies  Immunization History   Administered Date(s) Administered   • COVID-19 PFIZER VACCINE 0.3 ML IM 03/18/2021, 04/12/2021, 12/06/2021   • INFLUENZA 10/20/2022   • Influenza Quadrivalent Recombinant Preservative Free IM 10/28/2021   • Tdap 01/14/2022       Objective     /70 (BP Location: Left arm, Patient Position: Sitting, Cuff Size: Standard)   Pulse 82   Temp 98.7 °F (37.1 °C) (Tympanic)   Resp 18   Ht 5' 8" (1.727 m)   Wt 123 kg (272 lb)   SpO2 100%   BMI 41.36 kg/m²     Physical Exam  Vitals and nursing note reviewed. Constitutional:       General: He is not in acute distress. Appearance: Normal appearance. He is obese. He is not toxic-appearing or diaphoretic. HENT:      Head: Normocephalic and atraumatic.       Right Ear: Tympanic membrane normal.      Left Ear: Tympanic membrane normal.      Nose: Nose normal.      Mouth/Throat:      Mouth: Mucous membranes are moist.      Pharynx: Posterior oropharyngeal erythema present. No oropharyngeal exudate. Eyes:      Conjunctiva/sclera: Conjunctivae normal.   Neck:      Vascular: No carotid bruit. Cardiovascular:      Rate and Rhythm: Normal rate and regular rhythm. Heart sounds: No murmur heard. Pulmonary:      Effort: Pulmonary effort is normal.      Breath sounds: Normal breath sounds. Musculoskeletal:      Cervical back: Normal range of motion and neck supple. Right lower leg: No edema. Left lower leg: No edema. Lymphadenopathy:      Cervical: No cervical adenopathy. Skin:     General: Skin is warm and dry. Findings: No rash. Neurological:      General: No focal deficit present. Mental Status: He is alert and oriented to person, place, and time.    Psychiatric:         Mood and Affect: Mood normal.   Radha Humphreys DO

## 2023-12-01 ENCOUNTER — APPOINTMENT (OUTPATIENT)
Dept: LAB | Facility: HOSPITAL | Age: 58
End: 2023-12-01
Payer: COMMERCIAL

## 2023-12-01 DIAGNOSIS — I10 ESSENTIAL HYPERTENSION: ICD-10-CM

## 2023-12-01 LAB
ANION GAP SERPL CALCULATED.3IONS-SCNC: 3 MMOL/L
BUN SERPL-MCNC: 19 MG/DL (ref 5–25)
CALCIUM SERPL-MCNC: 9.1 MG/DL (ref 8.4–10.2)
CHLORIDE SERPL-SCNC: 106 MMOL/L (ref 96–108)
CO2 SERPL-SCNC: 30 MMOL/L (ref 21–32)
CREAT SERPL-MCNC: 0.9 MG/DL (ref 0.6–1.3)
GFR SERPL CREATININE-BSD FRML MDRD: 93 ML/MIN/1.73SQ M
GLUCOSE P FAST SERPL-MCNC: 79 MG/DL (ref 65–99)
POTASSIUM SERPL-SCNC: 4.7 MMOL/L (ref 3.5–5.3)
SODIUM SERPL-SCNC: 139 MMOL/L (ref 135–147)

## 2023-12-01 PROCEDURE — 80048 BASIC METABOLIC PNL TOTAL CA: CPT

## 2023-12-01 PROCEDURE — 36415 COLL VENOUS BLD VENIPUNCTURE: CPT

## 2023-12-07 DIAGNOSIS — K21.9 GASTROESOPHAGEAL REFLUX DISEASE, UNSPECIFIED WHETHER ESOPHAGITIS PRESENT: ICD-10-CM

## 2023-12-07 RX ORDER — RABEPRAZOLE SODIUM 20 MG/1
20 TABLET, DELAYED RELEASE ORAL DAILY
Qty: 90 TABLET | Refills: 0 | Status: SHIPPED | OUTPATIENT
Start: 2023-12-07

## 2023-12-20 ENCOUNTER — OFFICE VISIT (OUTPATIENT)
Dept: DERMATOLOGY | Facility: CLINIC | Age: 58
End: 2023-12-20
Payer: COMMERCIAL

## 2023-12-20 DIAGNOSIS — D22.61 MULTIPLE BENIGN MELANOCYTIC NEVI OF UPPER AND LOWER EXTREMITIES AND TRUNK: ICD-10-CM

## 2023-12-20 DIAGNOSIS — D22.62 MULTIPLE BENIGN MELANOCYTIC NEVI OF UPPER AND LOWER EXTREMITIES AND TRUNK: ICD-10-CM

## 2023-12-20 DIAGNOSIS — D22.72 MULTIPLE BENIGN MELANOCYTIC NEVI OF UPPER AND LOWER EXTREMITIES AND TRUNK: ICD-10-CM

## 2023-12-20 DIAGNOSIS — D22.71 MULTIPLE BENIGN MELANOCYTIC NEVI OF UPPER AND LOWER EXTREMITIES AND TRUNK: ICD-10-CM

## 2023-12-20 DIAGNOSIS — Z85.820 HISTORY OF MELANOMA: Primary | ICD-10-CM

## 2023-12-20 DIAGNOSIS — L81.4 LENTIGINES: ICD-10-CM

## 2023-12-20 DIAGNOSIS — L82.1 SEBORRHEIC KERATOSES: ICD-10-CM

## 2023-12-20 DIAGNOSIS — D22.5 MULTIPLE BENIGN MELANOCYTIC NEVI OF UPPER AND LOWER EXTREMITIES AND TRUNK: ICD-10-CM

## 2023-12-20 DIAGNOSIS — Z85.820 PERSONAL HISTORY OF MALIGNANT MELANOMA OF SKIN: ICD-10-CM

## 2023-12-20 DIAGNOSIS — L21.9 SEBORRHEIC DERMATITIS: ICD-10-CM

## 2023-12-20 DIAGNOSIS — L57.8 OTHER SKIN CHANGES DUE TO CHRONIC EXPOSURE TO NONIONIZING RADIATION: ICD-10-CM

## 2023-12-20 PROCEDURE — 99214 OFFICE O/P EST MOD 30 MIN: CPT | Performed by: DERMATOLOGY

## 2023-12-20 RX ORDER — KETOCONAZOLE 20 MG/G
CREAM TOPICAL
Qty: 60 G | Refills: 6 | Status: SHIPPED | OUTPATIENT
Start: 2023-12-20

## 2023-12-20 NOTE — PROGRESS NOTES
"[facility] Note     Patient Name: Haroldo Abreu  Encounter Date: 12/20/23     Have you been cared for by a [facility] Dermatologist in the last 3 years and, if so, which description applies to you?    Yes.  I have been here within the last 3 years, and my medical history has NOT changed since that time.  I am MALE/not capable of bearing children.    REVIEW OF SYSTEMS:  Have you recently had or currently have any of the following? No changes in my recent health.   PAST MEDICAL HISTORY:  Have you personally ever had or currently have any of the following?  If \"YES,\" then please provide more detail. No changes in my medical history.   HISTORY OF IMMUNOSUPPRESSION: Do you have a history of any of the following:  Systemic Immunosuppression such as Diabetes, Biologic or Immunotherapy, Chemotherapy, Organ Transplantation, Bone Marrow Transplantation?  No     Answering \"YES\" requires the addition of the dotphrase \"IMMUNOSUPPRESSED\" as the first diagnosis of the patient's visit.   FAMILY HISTORY:  Any \"first degree relatives\" (parent, brother, sister, or child) with the following?    No changes in my family's known health.   PATIENT EXPERIENCE:    Do you want the Dermatologist to perform a COMPLETE skin exam today including a clinical examination under the \"bra and underwear\" areas?  Yes  If necessary, do we have your permission to call and leave a detailed message on your Preferred Phone number that includes your specific medical information?  Yes      No Known Allergies   Current Outpatient Medications:     ketoconazole (NIZORAL) 2 % cream, Apply 2x/day to bottom of feet and in between toes for 3 weeks. Apply on facial redness/rash as needed., Disp: 60 g, Rfl: 0    lisinopril (ZESTRIL) 10 mg tablet, Take 1 tablet (10 mg total) by mouth daily, Disp: 90 tablet, Rfl: 1    Multiple Vitamins-Minerals (MULTIVITAMIN ADULTS PO), Take 1 capsule by mouth daily, Disp: , Rfl:     RABEprazole (ACIPHEX) 20 MG " tablet, Take 1 tablet (20 mg total) by mouth daily, Disp: 90 tablet, Rfl: 0    triamcinolone (KENALOG) 0.025 % cream, Apply topically 2 (two) times a day Apply sparingly 2-4 times a day for up to one week. (Patient taking differently: Apply topically as needed Apply sparingly 2-4 times a day for up to one week.), Disp: 30 g, Rfl: 0          Whom besides the patient is providing clinical information about today's encounter?   NO ADDITIONAL HISTORIAN (patient alone provided history)    Physical Exam and Assessment/Plan by Diagnosis:    HISTORY OF MELANOMA    Physical Exam:  Anatomic Location Affected:  Right upper lateral back  Morphological Description of Scar:  well healed scar  Year Treated: 2017   TNM Classification: T1B  Suspected Recurrence: no  Regional adenopathy: no    Additional History of Present Condition:   Patient had a history of melanoma on March 24, 2017.  Patient is being seen every year.  He states he is not aware of any new lesions, but has many moles and freckles.     Assessment and Plan:  Based on a thorough discussion of this condition and the management approach to it (including a comprehensive discussion of the known risks, side effects and potential benefits of treatment), the patient (family) agrees to implement the following specific plan:    Will continue to monitor for recurrence.  Recommend sunscreen with SPF 30 or higher daily with a wide hat, and sun protective clothing.   Continue following up every 6 months for skin examinations.     What happens at follow-up?  The main purpose of follow-up is to detect recurrences early (metastatic melanoma), but it also offers an opportunity to diagnose a new primary melanoma at the first possible opportunity. A second invasive melanoma occurs in 5-10% of melanoma patients and a new melanoma in situ is diagnosed in more than 20% of melanoma patients.    Our practice makes the following recommendations for follow-up for patients with invasive  "melanoma.  At-least \"monthly\" self-skin examinations   Routine skin checks by a board certified dermatologist  Follow-up intervals are \"every 3 months\" within 2 years of a new melanoma diagnosis; \"every 6 months\" between 2-4 years of a new melanoma diagnosis; and \"annually\" after 4 years of a new melanoma diagnosis  Individual patient's needs should be considered before an appropriate follow-up is offered  Provide education and support to help the patient adjust to their illness    Follow-up appointments should include:  A check of the scar where the primary melanoma was removed  Checking the regional lymph nodes  A general skin examination  A full physical examination at least annually by your primary care physician    In those with more advanced primary disease, follow-up may include:  Blood tests  Imaging: ultrasound, X-ray, CT, MRI and PET scan.    Most tests are not worthwhile for patients with stage 1 or 2 melanoma unless there are signs or symptoms of disease recurrence or metastasis. No tests are necessary for healthy patients who have remained well for five years or longer after removal of their melanoma.    What is the outlook for patients with melanoma?  Melanoma in situ is cured by excision because it has no potential to spread around the body.  The risk of spread and ultimate death from invasive melanoma depends on several factors, but the main one is the Breslow thickness of the melanoma at the time it was surgically removed.  Metastases are rare for melanomas < 0.75 mm and the risk for tumours 0.75-1 mm thick is about 5%. The risk steadily increases with thickness so that melanomas > 4 mm have a risk of metastasis of about 40%.    Melanoma is a potentially serious type of skin cancer, in which there is uncontrolled growth of melanocytes (pigment cells). Melanoma is sometimes called malignant melanoma.  Normal melanocytes are found in the basal layer of the epidermis (the outer layer of skin). " "Melanocytes produce a protein called melanin, which protects skin cells by absorbing ultraviolet (UV) radiation. Melanocytes are found in equal numbers in black and white skin, but melanocytes in black skin produce much more melanin. People with dark brown or black skin are very much less likely to be damaged by UV radiation than those with white skin.        SEBORRHEIC KERATOSES; NON-INFLAMED     Physical Exam:  Anatomic Location Affected:  Trunk and extremities  Morphological Description:  Waxy, smooth to warty textured, yellow to brownish-grey to dark brown to blackish, discrete, \"stuck-on\" appearing papules.  Present for years. Denies pain, itch, bleeding.      Additional History of Present Condition:  Present constantly; no modifying factors which make it worse or better. No prior treatment.       Assessment and Plan:  Based on a thorough discussion of this condition and the management approach to it (including a comprehensive discussion of the known risks, side effects and potential benefits of treatment), the patient (family) agrees to implement the following specific plan:  Reassure benign  Use sun protection.  Apply SPF 30 or higher at least three times a day.  Wear sun protecting clothing and hats.        SOLAR LENTIGINES   OTHER SKIN CHANGES DUE TO CHRONIC EXPOSURE TO NONIONIZING RADIATION     Physical Exam:  Anatomic Location Affected:  Sun exposed areas of back, chest, arms, legs  Morphological Description:  Multiple scattered brown to tan evenly pigmented macules   Denies pain, itch, bleeding. No treatments tried. Present for months - years. Reports getting newer lesions with sun exposure.         Assessment and Plan:  Based on a thorough discussion of this condition and the management approach to it (including a comprehensive discussion of the known risks, side effects and potential benefits of treatment), the patient (family) agrees to implement the following specific plan:  Reassure benign  Use sun " "protection.  Apply SPF 30 or higher at least three times a day.  Wear sun protecting clothing and hats.         MULTIPLE MELANOCYTIC NEVI (\"Moles\")     Physical Exam:  Anatomic Location Affected: Trunk and extremities  Morphological Description:  Scattered, round to ovoid, symmetrical-appearing, even bordered, skin colored to dark brown macules/papules  Denies pain, itch, bleeding. No treatments tried. Present for years. Present constantly; no modifying factors which make it worse or better. Denies actively changing or growing moles.      Assessment and Plan:  Based on a thorough discussion of this condition and the management approach to it (including a comprehensive discussion of the known risks, side effects and potential benefits of treatment), the patient (family) agrees to implement the following specific plan:  Reassure benign  Monitor for changes  Use sun protection.  Apply SPF 30 or higher at least three times a day.  Wear sun protecting clothing and hats.       Worrisome signs of skin malignancy discussed, questions answered. Regular self-skin check discussed. Advised to call or return to office if patient notices any spots of concern, rapidly growing/changing lesions, bleeding lesions, non-healing lesions. Advised regular SPF use.    "

## 2024-02-14 ENCOUNTER — TELEPHONE (OUTPATIENT)
Dept: UROLOGY | Facility: AMBULATORY SURGERY CENTER | Age: 59
End: 2024-02-14

## 2024-02-15 NOTE — TELEPHONE ENCOUNTER
Called the patient and left a VM,   The appointment that  is scheduled with Dr. Silverio on 2/26 will need to be cancelled and rescheduled please call us at [phone] to do so, thank-you.needs to be rescheduled with AP.     Please reschedule with AP, thank-you.

## 2024-02-19 NOTE — TELEPHONE ENCOUNTER
Pt has been scheduled as follows:    Date: 5/9/2024     Time: 10:00 AM     Visit Type: NEW PATIENT [facility] [[id number]]     Provider: Tanvi Gaines PA-C Department: [facility]

## 2024-03-03 DIAGNOSIS — I10 ESSENTIAL HYPERTENSION: ICD-10-CM

## 2024-03-03 DIAGNOSIS — K21.9 GASTROESOPHAGEAL REFLUX DISEASE, UNSPECIFIED WHETHER ESOPHAGITIS PRESENT: ICD-10-CM

## 2024-03-04 RX ORDER — LISINOPRIL 10 MG/1
10 TABLET ORAL DAILY
Qty: 90 TABLET | Refills: 0 | Status: SHIPPED | OUTPATIENT
Start: 2024-03-04

## 2024-03-04 RX ORDER — RABEPRAZOLE SODIUM 20 MG/1
20 TABLET, DELAYED RELEASE ORAL DAILY
Qty: 90 TABLET | Refills: 1 | Status: SHIPPED | OUTPATIENT
Start: 2024-03-04

## 2024-03-26 ENCOUNTER — TELEPHONE (OUTPATIENT)
Dept: FAMILY MEDICINE CLINIC | Facility: CLINIC | Age: 59
End: 2024-03-26

## 2024-03-26 ENCOUNTER — HOSPITAL ENCOUNTER (OUTPATIENT)
Dept: NON INVASIVE DIAGNOSTICS | Age: 59
Discharge: HOME | End: 2024-03-26
Payer: COMMERCIAL

## 2024-03-26 VITALS
DIASTOLIC BLOOD PRESSURE: 80 MMHG | HEIGHT: 68 IN | HEART RATE: 62 BPM | BODY MASS INDEX: 41.22 KG/M2 | SYSTOLIC BLOOD PRESSURE: 132 MMHG | WEIGHT: 272 LBS

## 2024-03-26 DIAGNOSIS — I77.810 AORTIC ROOT DILATION (HCC): ICD-10-CM

## 2024-03-26 LAB
AORTIC ROOT: 4 CM
APICAL FOUR CHAMBER EJECTION FRACTION: 51 %
ASCENDING AORTA: 3.6 CM
BSA FOR ECHO PROCEDURE: 2.33 M2
DOP CALC LVOT AREA: 4.91 CM2
DOP CALC LVOT DIAMETER: 2.5 CM
E WAVE DECELERATION TIME: 298 MS
E/A RATIO: 0.92
FRACTIONAL SHORTENING: 26 (ref 28–44)
INTERVENTRICULAR SEPTUM IN DIASTOLE (PARASTERNAL SHORT AXIS VIEW): 1.2 CM
INTERVENTRICULAR SEPTUM: 1.2 CM (ref 0.6–1.1)
LAAS-AP2: 20.4 CM2
LAAS-AP4: 13.9 CM2
LEFT ATRIUM AREA SYSTOLE SINGLE PLANE A4C: 12.5 CM2
LEFT ATRIUM SIZE: 4.9 CM
LEFT ATRIUM VOLUME (MOD BIPLANE): 55 ML
LEFT ATRIUM VOLUME INDEX (MOD BIPLANE): 23.6 ML/M2
LEFT INTERNAL DIMENSION IN SYSTOLE: 3.9 CM (ref 2.1–4)
LEFT VENTRICULAR INTERNAL DIMENSION IN DIASTOLE: 5.3 CM (ref 3.5–6)
LEFT VENTRICULAR POSTERIOR WALL IN END DIASTOLE: 1.1 CM
LEFT VENTRICULAR STROKE VOLUME: 70 ML
LVSV (TEICH): 70 ML
MV E'TISSUE VEL-SEP: 11 CM/S
MV PEAK A VEL: 0.59 M/S
MV PEAK E VEL: 54 CM/S
MV STENOSIS PRESSURE HALF TIME: 87 MS
MV VALVE AREA P 1/2 METHOD: 2.5
RIGHT ATRIUM AREA SYSTOLE A4C: 16.7 CM2
RIGHT VENTRICLE ID DIMENSION: 4.1 CM
SL CV LEFT ATRIUM LENGTH A2C: 5.3 CM
SL CV LV EF: 55
SL CV PED ECHO LEFT VENTRICLE DIASTOLIC VOLUME (MOD BIPLANE) 2D: 135 ML
SL CV PED ECHO LEFT VENTRICLE SYSTOLIC VOLUME (MOD BIPLANE) 2D: 65 ML
TR MAX PG: 24 MMHG
TR PEAK VELOCITY: 2.5 M/S
TRICUSPID ANNULAR PLANE SYSTOLIC EXCURSION: 2.5 CM
TRICUSPID VALVE PEAK REGURGITATION VELOCITY: 2.45 M/S

## 2024-03-26 PROCEDURE — 93306 TTE W/DOPPLER COMPLETE: CPT | Performed by: INTERNAL MEDICINE

## 2024-03-26 PROCEDURE — 93306 TTE W/DOPPLER COMPLETE: CPT

## 2024-03-26 NOTE — TELEPHONE ENCOUNTER
Pt aware    ----- Message from Sherlyn Victoria DO sent at 3/26/2024 11:19 AM EDT -----  Let patient know that echo remains stable from prior echo. While cardiologist read it as moderate aortic root dilation, looks like diameter is unchanged (4 cm)

## 2024-05-09 ENCOUNTER — OFFICE VISIT (OUTPATIENT)
Dept: UROLOGY | Facility: CLINIC | Age: 59
End: 2024-05-09
Payer: COMMERCIAL

## 2024-05-09 ENCOUNTER — TELEPHONE (OUTPATIENT)
Dept: UROLOGY | Facility: CLINIC | Age: 59
End: 2024-05-09

## 2024-05-09 VITALS
WEIGHT: 278 LBS | HEIGHT: 68 IN | HEART RATE: 72 BPM | OXYGEN SATURATION: 97 % | DIASTOLIC BLOOD PRESSURE: 86 MMHG | BODY MASS INDEX: 42.13 KG/M2 | SYSTOLIC BLOOD PRESSURE: 138 MMHG

## 2024-05-09 DIAGNOSIS — N39.43 BENIGN PROSTATIC HYPERPLASIA WITH POST-VOID DRIBBLING: ICD-10-CM

## 2024-05-09 DIAGNOSIS — Z85.47 HISTORY OF TESTICULAR CANCER: ICD-10-CM

## 2024-05-09 DIAGNOSIS — R35.0 FREQUENCY OF MICTURITION: ICD-10-CM

## 2024-05-09 DIAGNOSIS — N39.43 POST-VOID DRIBBLING: Primary | ICD-10-CM

## 2024-05-09 DIAGNOSIS — N40.1 BENIGN PROSTATIC HYPERPLASIA WITH POST-VOID DRIBBLING: ICD-10-CM

## 2024-05-09 LAB — POST-VOID RESIDUAL VOLUME, ML POC: 20 ML

## 2024-05-09 PROCEDURE — 51798 US URINE CAPACITY MEASURE: CPT | Performed by: PHYSICIAN ASSISTANT

## 2024-05-09 PROCEDURE — 99203 OFFICE O/P NEW LOW 30 MIN: CPT | Performed by: PHYSICIAN ASSISTANT

## 2024-05-09 RX ORDER — TADALAFIL 5 MG/1
5 TABLET ORAL DAILY
Qty: 90 TABLET | Refills: 3 | Status: SHIPPED | OUTPATIENT
Start: 2024-05-09

## 2024-05-09 NOTE — TELEPHONE ENCOUNTER
Contacted patient and scheduled him for 3 month med check with NASREEN Tinajero in the [facility].

## 2024-05-09 NOTE — TELEPHONE ENCOUNTER
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   He can be reached at [phone]. Message can be left on [facility] [employer].

## 2024-05-09 NOTE — PROGRESS NOTES
5/9/2024      Chief Complaint   Patient presents with   • New Patient Visit     Reestablish care with a urologist    • testicular cancer     H/O testicular cancer, right testicle was removed         Assessment and Plan    58 y.o. male managed by NEW PATIENT    1. Post-void dribbling  -     Ambulatory Referral to Urology  -     POCT Measure PVR    2. History of testicular cancer  -     Ambulatory Referral to Urology  -     POCT Measure PVR    3. Frequency of micturition  -     Ambulatory Referral to Urology  -     POCT Measure PVR    4. Benign prostatic hyperplasia with post-void dribbling  -     tadalafil (CIALIS) 5 MG tablet; Take 1 tablet (5 mg total) by mouth in the morning      BPH, prostate cancer screening, erectile dysfunction, testicular cancer all discussed today, discussion to help decide between alpha-blocker versus daily PDE 5 inhibitor.  At this time-plan for his mild BPH symptoms and mild ED symptoms is daily tadalafil 5 mg.  See back in 3 months for symptom reassessment, I particularly think this will help with the frequency and postvoid dribbling.  He will be conscious of his fluid/caffeine consumption between coffee and tea as well.  Good water drinker.  SCOTT today smooth prostate no nodule or asymmetry, exams recommended yearly.  He has a PSA ordered which she will have drawn in 2 weeks just prior to his physical with his PCP.      Lab Results   Component Value Date    PSA 0.4 12/17/2021       History of Present Illness  Haroldo Abreu is a 58 y.o. male here for evaluation of urinary frequency, occasional intermittent stream, most bothersome postvoid dribbling.  Urinary frequency and intermittent stream.  Symptoms for the past 3-4 years, has seen some progression from unknowing to bothersome.  Flow is strong otherwise.  Wakes once at night to void.  Has not used medications for this previously.    Remote testicular carcinoma treated years ago with inguinal orchiectomy in 2000 with his prior [facility]  urologist with adjuvant radiation. No chemotherapy. Surveillance ct scans and tumor markers for at least 7 years no recurrence.-Their notes from 2021 are reviewed in Care Everywhere.  He also happened to have a CT chest last year for pneumonia which showed no evidence of disease.  No other recent abdominal pelvic imaging.    He is up-to-date with colorectal cancer screening had 2 colonoscopies thus far.    He is sexually active, erections are good, could be better.      DISCUSSION  Today we discussed the normal anatomy of the bladder, prostate, bladder neck, and urethra, as well physiology as it relates to storage and emptying of the bladder utilizing drawn pictures or diagrams. We discussed the incidence of BPH as it relates to obstructive and irritative voiding symptoms as well as diagnosis, and treatment as below. We also discussed risks of delayed diagnosis and treatment including recurrent UTI, stone formation, hematuria, and renal dysfunction that may prompt specific type of treatment. Treatment options discussed include behavior modifications/avoidance of bladder irritants, medications such as Flomax, Proscar or Cialis, and surgery such as Urolift, TURP, prostatectomy. Alternative or concurrent diagnoses of OAB or MARIELLE were also reviewed requiring multimodal treatments.    Review of Systems   Constitutional: Negative.    Respiratory: Negative.     Cardiovascular: Negative.    Genitourinary:  Positive for frequency. Negative for decreased urine volume, difficulty urinating, dysuria, flank pain, hematuria and urgency.   Musculoskeletal: Negative.            AUA SYMPTOM SCORE      Flowsheet Row Most Recent Value   AUA SYMPTOM SCORE    How often have you had a sensation of not emptying your bladder completely after you finished urinating? 3 (P)    How often have you had to urinate again less than two hours after you finished urinating? 3 (P)    How often have you found you stopped and started again several times  "when you urinate? 4 (P)    How often have you found it difficult to postpone urination? 0 (P)    How often have you had a weak urinary stream? 0 (P)    How often have you had to push or strain to begin urination? 0 (P)    How many times did you most typically get up to urinate from the time you went to bed at night until the time you got up in the morning? 1 (P)    Quality of Life: If you were to spend the rest of your life with your urinary condition just the way it is now, how would you feel about that? 4 (P)    AUA SYMPTOM SCORE 11 (P)              Vitals  Vitals:    05/09/24 0956   BP: 138/86   BP Location: Left arm   Patient Position: Sitting   Cuff Size: Large   Pulse: 72   SpO2: 97%   Weight: 126 kg (278 lb)   Height: 5' 8\" (1.727 m)       Physical Exam  Vitals and nursing note reviewed.   Constitutional:       General: He is not in acute distress.     Appearance: Normal appearance. He is well-developed. He is not diaphoretic.   HENT:      Head: Normocephalic and atraumatic.   Pulmonary:      Effort: Pulmonary effort is normal.      Comments: No cough or audible wheeze  Abdominal:      General: There is no distension.      Tenderness: There is no abdominal tenderness. There is no right CVA tenderness or left CVA tenderness.   Genitourinary:     Comments: Circumcised penis, normal phallus, orthotopic patent meatus.  Right testicle absent. Left testicle smooth descended no mass or tenderness. Digital rectal exam smooth 30g prostate, without appreciable nodule, induration or asymmetry  Musculoskeletal:      Right lower leg: No edema.      Left lower leg: No edema.   Skin:     General: Skin is warm and dry.   Neurological:      Mental Status: He is alert and oriented to person, place, and time.      Gait: Gait normal.   Psychiatric:         Speech: Speech normal.         Behavior: Behavior normal.           Past History  Past Medical History:   Diagnosis Date   • Cancer (HCC) 2018   • Colon polyp    • COVID-19 " 12/2020   • GERD (gastroesophageal reflux disease)    • HL (hearing loss) 1974    Tumor   • Melanoma (HCC) 2017    Dermatology and Moh's surgery in [place]. Afua; Right lateral upper back   • Pneumonia 2/21/2023    I have had a cough since early Deecember   • Testicular cancer (HCC) 2000    Dr monterroso and yony (urology)     Social History     Socioeconomic History   • Marital status: /Civil Union     Spouse name: None   • Number of children: None   • Years of education: None   • Highest education level: None   Occupational History   • None   Tobacco Use   • Smoking status: Never   • Smokeless tobacco: Never   Vaping Use   • Vaping status: Never Used   Substance and Sexual Activity   • Alcohol use: Yes     Alcohol/week: 1.0 standard drink of alcohol     Types: 1 Cans of beer per week     Comment: socially   • Drug use: Never   • Sexual activity: Yes     Partners: Female     Birth control/protection: None   Other Topics Concern   • None   Social History Narrative    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 [place]        2 children    Likes to golf     Social Determinants of Health     Financial Resource Strain: Not on file   Food Insecurity: Not on file   Transportation Needs: Not on file   Physical Activity: Not on file   Stress: Not on file   Social Connections: Not on file   Intimate Partner Violence: Not on file   Housing Stability: Not on file     Social History     Tobacco Use   Smoking Status Never   Smokeless Tobacco Never     Family History   Problem Relation Age of Onset   • Diabetes Mother    • Hypertension Mother    • Hypertension Father    • Heart disease Father    • Heart failure Father         Congestive   • No Known Problems Sister    • No Known Problems Brother    • No Known Problems Brother    • Colon polyps Neg Hx    • Colon cancer Neg Hx        The following portions of the patient's history were reviewed and updated as appropriate:  allergies, current medications, past medical history, past social history, past surgical history and problem list.    Results  Recent Results (from the past 1 hour(s))   POCT Measure PVR    Collection Time: 05/09/24 10:04 AM   Result Value Ref Range    POST-VOID RESIDUAL VOLUME, ML POC 20 mL   ]  Lab Results   Component Value Date    PSA 0.4 12/17/2021     Lab Results   Component Value Date    CALCIUM 9.1 12/01/2023    K 4.7 12/01/2023    CO2 30 12/01/2023     12/01/2023    BUN 19 12/01/2023    CREATININE 0.90 12/01/2023     Lab Results   Component Value Date    WBC 19.09 (H) 02/21/2023    HGB 13.5 02/21/2023    HCT 39.4 02/21/2023    MCV 88 02/21/2023     02/21/2023

## 2024-05-24 ENCOUNTER — APPOINTMENT (OUTPATIENT)
Dept: LAB | Facility: HOSPITAL | Age: 59
End: 2024-05-24
Payer: COMMERCIAL

## 2024-05-24 DIAGNOSIS — Z12.5 PROSTATE CANCER SCREENING: ICD-10-CM

## 2024-05-24 DIAGNOSIS — Z13.6 SCREENING FOR CARDIOVASCULAR CONDITION: ICD-10-CM

## 2024-05-24 DIAGNOSIS — Z13.1 SCREENING FOR DIABETES MELLITUS: ICD-10-CM

## 2024-05-24 LAB
ALBUMIN SERPL BCP-MCNC: 4 G/DL (ref 3.5–5)
ALP SERPL-CCNC: 55 U/L (ref 34–104)
ALT SERPL W P-5'-P-CCNC: 15 U/L (ref 7–52)
ANION GAP SERPL CALCULATED.3IONS-SCNC: 6 MMOL/L (ref 4–13)
AST SERPL W P-5'-P-CCNC: 17 U/L (ref 13–39)
BILIRUB SERPL-MCNC: 0.61 MG/DL (ref 0.2–1)
BUN SERPL-MCNC: 20 MG/DL (ref 5–25)
CALCIUM SERPL-MCNC: 8.9 MG/DL (ref 8.4–10.2)
CHLORIDE SERPL-SCNC: 108 MMOL/L (ref 96–108)
CHOLEST SERPL-MCNC: 187 MG/DL
CO2 SERPL-SCNC: 27 MMOL/L (ref 21–32)
CREAT SERPL-MCNC: 0.83 MG/DL (ref 0.6–1.3)
EST. AVERAGE GLUCOSE BLD GHB EST-MCNC: 111 MG/DL
GFR SERPL CREATININE-BSD FRML MDRD: 96 ML/MIN/1.73SQ M
GLUCOSE SERPL-MCNC: 97 MG/DL (ref 65–140)
HBA1C MFR BLD: 5.5 %
HDLC SERPL-MCNC: 46 MG/DL
LDLC SERPL CALC-MCNC: 117 MG/DL (ref 0–100)
NONHDLC SERPL-MCNC: 141 MG/DL
POTASSIUM SERPL-SCNC: 4 MMOL/L (ref 3.5–5.3)
PROT SERPL-MCNC: 7.2 G/DL (ref 6.4–8.4)
PSA SERPL-MCNC: 0.4 NG/ML (ref 0–4)
SODIUM SERPL-SCNC: 141 MMOL/L (ref 135–147)
TRIGL SERPL-MCNC: 120 MG/DL

## 2024-05-24 PROCEDURE — 83036 HEMOGLOBIN GLYCOSYLATED A1C: CPT

## 2024-05-24 PROCEDURE — 80061 LIPID PANEL: CPT

## 2024-05-24 PROCEDURE — 36415 COLL VENOUS BLD VENIPUNCTURE: CPT

## 2024-05-24 PROCEDURE — 80053 COMPREHEN METABOLIC PANEL: CPT

## 2024-05-24 PROCEDURE — G0103 PSA SCREENING: HCPCS

## 2024-05-24 NOTE — PROGRESS NOTES
Adult Annual Physical  Name: Haroldo Abreu      : 1965      MRN: [id number]  Encounter Provider: Sherlyn Victoria DO  Encounter Date: 2024   Encounter department: [facility] PRIMARY CARE    Assessment & Plan   1. Annual physical exam  Discussed diet and exercise  Recommend he start walking daily  Screening labs reviewed  Adacel up to date.   Due for shingrix which was ordered today  Colon cancer screen up to date  PSA up to date.   2. Essential hypertension  -     lisinopril (ZESTRIL) 20 mg tablet; Take 1 tablet (20 mg total) by mouth daily  -     Basic metabolic panel; Future; Expected date: 2024  Bp not at goal. Increase to 20 mg daily  Recheck bp in 2 months when he comes back for nurse visit for his shingrix.   Get bmp in 6 months followed by appt.   3. Aortic root dilation (HCC)  4. Gastroesophageal reflux disease, unspecified whether esophagitis present  Stable on aciphex  5. Encounter for immunization  -     Zoster Vaccine Recombinant IM  Immunizations and preventive care screenings were discussed with patient today. Appropriate education was printed on patient's after visit summary.    Discussed risks and benefits of prostate cancer screening. We discussed the controversial history of PSA screening for prostate cancer in the [place] as well as the risk of over detection and over treatment of prostate cancer by way of PSA screening.  The patient understands that PSA blood testing is an imperfect way to screen for prostate cancer and that elevated PSA levels in the blood may also be caused by infection, inflammation, prostatic trauma or manipulation, urological procedures, or by benign prostatic enlargement.    The role of the digital rectal examination in prostate cancer screening was also discussed and I discussed with him that there is large interobserver variability in the findings of digital rectal examination.    Counseling:  Alcohol/drug use: discussed  "moderation in alcohol intake, the recommendations for healthy alcohol use, and avoidance of illicit drug use.  Dental Health: discussed importance of regular tooth brushing, flossing, and dental visits.  Exercise: the importance of regular exercise/physical activity was discussed. Recommend exercise 3-5 times per week for at least 30 minutes.          History of Present Illness     Adult Annual Physical:  Patient presents for annual physical.     Diet and Physical Activity:  - Diet/Nutrition:. has not been eating well. son got  in [place] so was eating \"a lof stuff i shouldn't have\"  - Exercise: no formal exercise. hopes to start walking on treadmill again    Depression Screening:  - PHQ-2 Score: 0    General Health:  - Sleep: sleeps well.  - Hearing: normal hearing right ear and normal hearing left ear.  - Vision: no vision problems.  - Dental: regular dental visits.     Health:  - History of STDs: no.   - Urinary symptoms: post-void dribbling, urinary frequency and nocturia.     Advanced Care Planning:  - Has an advanced directive?: no    - Has a durable medical POA?: no    - ACP document given to patient?: no      Patient is a 58 year old male with hypertension, aortic root dilation, GERD and history of testicular CA being seen today for annual physical and for f/u on his hypertension and review of labs.   His colon cancer screening is up to date (6/13/21)  Adacel up to date (1/14/22)  Due for shingrix.   Saw urology earlier this month for his post-void dribbling/mild BPH and mild ED sx.. Was given rx for cialis 5 mg daily in AM.         Review of Systems  Medical History Reviewed by provider this encounter:  Tobacco  Allergies  Meds  Med Hx  Surg Hx  Fam Hx  Soc Hx      Current Outpatient Medications on File Prior to Visit   Medication Sig Dispense Refill   • ketoconazole (NIZORAL) 2 % cream Apply 2x/day to bottom of feet and in between toes for 3 weeks. Apply on facial redness/rash as needed. 60 " "g 6   • Multiple Vitamins-Minerals (MULTIVITAMIN ADULTS PO) Take 1 capsule by mouth daily     • RABEprazole (ACIPHEX) 20 MG tablet Take 1 tablet (20 mg total) by mouth daily 90 tablet 1   • tadalafil (CIALIS) 5 MG tablet Take 1 tablet (5 mg total) by mouth in the morning 90 tablet 3   • triamcinolone (KENALOG) 0.025 % cream Apply topically 2 (two) times a day Apply sparingly 2-4 times a day for up to one week. (Patient taking differently: Apply topically as needed Apply sparingly 2-4 times a day for up to one week.) 30 g 0   • [DISCONTINUED] lisinopril (ZESTRIL) 10 mg tablet Take 1 tablet (10 mg total) by mouth daily 90 tablet 0     No current facility-administered medications on file prior to visit.        Objective     /76 (BP Location: Left arm, Patient Position: Sitting, Cuff Size: Standard)   Pulse 73   Temp 98.4 °F (36.9 °C) (Tympanic)   Resp 18   Ht 5' 8.5\" (1.74 m)   Wt 127 kg (280 lb)   SpO2 100%   BMI 41.95 kg/m²     Physical Exam  Vitals and nursing note reviewed.   Constitutional:       General: He is not in acute distress.     Appearance: Normal appearance. He is obese. He is not ill-appearing, toxic-appearing or diaphoretic.   HENT:      Head: Normocephalic and atraumatic.      Left Ear: Tympanic membrane normal.      Ears:      Comments: Hearing aid right ear     Nose: Nose normal.      Mouth/Throat:      Mouth: Mucous membranes are moist.      Pharynx: No posterior oropharyngeal erythema.   Eyes:      Extraocular Movements: Extraocular movements intact.      Conjunctiva/sclera: Conjunctivae normal.      Pupils: Pupils are equal, round, and reactive to light.   Neck:      Vascular: No carotid bruit.      Comments: No thyromegaly  Cardiovascular:      Rate and Rhythm: Normal rate and regular rhythm.      Heart sounds: No murmur heard.  Pulmonary:      Effort: Pulmonary effort is normal.      Breath sounds: Normal breath sounds.   Abdominal:      General: Abdomen is flat. Bowel sounds are " normal.      Palpations: Abdomen is soft.   Musculoskeletal:      Cervical back: Normal range of motion and neck supple.      Right lower leg: Right lower leg edema: trace bilateral lower extremity edema.   Lymphadenopathy:      Cervical: No cervical adenopathy.   Skin:     General: Skin is warm and dry.      Findings: No rash.   Neurological:      General: No focal deficit present.      Mental Status: He is alert and oriented to person, place, and time.      Deep Tendon Reflexes: Reflexes abnormal.   Psychiatric:         Mood and Affect: Mood normal.       Administrative Statements

## 2024-05-28 ENCOUNTER — OFFICE VISIT (OUTPATIENT)
Dept: FAMILY MEDICINE CLINIC | Facility: CLINIC | Age: 59
End: 2024-05-28
Payer: COMMERCIAL

## 2024-05-28 VITALS
HEIGHT: 69 IN | SYSTOLIC BLOOD PRESSURE: 144 MMHG | HEART RATE: 73 BPM | BODY MASS INDEX: 41.47 KG/M2 | TEMPERATURE: 98.4 F | WEIGHT: 280 LBS | RESPIRATION RATE: 18 BRPM | DIASTOLIC BLOOD PRESSURE: 76 MMHG | OXYGEN SATURATION: 100 %

## 2024-05-28 DIAGNOSIS — Z23 ENCOUNTER FOR IMMUNIZATION: ICD-10-CM

## 2024-05-28 DIAGNOSIS — I77.810 AORTIC ROOT DILATION (HCC): ICD-10-CM

## 2024-05-28 DIAGNOSIS — I10 ESSENTIAL HYPERTENSION: ICD-10-CM

## 2024-05-28 DIAGNOSIS — Z00.00 ANNUAL PHYSICAL EXAM: Primary | ICD-10-CM

## 2024-05-28 DIAGNOSIS — K21.9 GASTROESOPHAGEAL REFLUX DISEASE, UNSPECIFIED WHETHER ESOPHAGITIS PRESENT: ICD-10-CM

## 2024-05-28 PROCEDURE — 99396 PREV VISIT EST AGE 40-64: CPT | Performed by: FAMILY MEDICINE

## 2024-05-28 PROCEDURE — 90471 IMMUNIZATION ADMIN: CPT

## 2024-05-28 PROCEDURE — 99213 OFFICE O/P EST LOW 20 MIN: CPT | Performed by: FAMILY MEDICINE

## 2024-05-28 PROCEDURE — 90750 HZV VACC RECOMBINANT IM: CPT

## 2024-05-28 RX ORDER — LISINOPRIL 20 MG/1
20 TABLET ORAL DAILY
Qty: 100 TABLET | Refills: 3 | Status: SHIPPED | OUTPATIENT
Start: 2024-05-28

## 2024-06-03 DIAGNOSIS — K21.9 GASTROESOPHAGEAL REFLUX DISEASE, UNSPECIFIED WHETHER ESOPHAGITIS PRESENT: ICD-10-CM

## 2024-06-04 RX ORDER — RABEPRAZOLE SODIUM 20 MG/1
20 TABLET, DELAYED RELEASE ORAL DAILY
Qty: 90 TABLET | Refills: 0 | Status: SHIPPED | OUTPATIENT
Start: 2024-06-04

## 2024-06-27 DIAGNOSIS — L21.9 SEBORRHEIC DERMATITIS: ICD-10-CM

## 2024-06-28 RX ORDER — KETOCONAZOLE 20 MG/G
CREAM TOPICAL
Qty: 60 G | Refills: 0 | Status: SHIPPED | OUTPATIENT
Start: 2024-06-28

## 2024-07-10 ENCOUNTER — OFFICE VISIT (OUTPATIENT)
Dept: FAMILY MEDICINE CLINIC | Facility: CLINIC | Age: 59
End: 2024-07-10
Payer: COMMERCIAL

## 2024-07-10 ENCOUNTER — APPOINTMENT (OUTPATIENT)
Dept: LAB | Facility: HOSPITAL | Age: 59
End: 2024-07-10
Payer: COMMERCIAL

## 2024-07-10 VITALS
DIASTOLIC BLOOD PRESSURE: 73 MMHG | WEIGHT: 268.2 LBS | TEMPERATURE: 98.9 F | HEIGHT: 69 IN | SYSTOLIC BLOOD PRESSURE: 143 MMHG | OXYGEN SATURATION: 95 % | RESPIRATION RATE: 18 BRPM | BODY MASS INDEX: 39.72 KG/M2 | HEART RATE: 87 BPM

## 2024-07-10 DIAGNOSIS — M25.50 ARTHRALGIA, UNSPECIFIED JOINT: ICD-10-CM

## 2024-07-10 DIAGNOSIS — R53.1 WEAKNESS GENERALIZED: Primary | ICD-10-CM

## 2024-07-10 DIAGNOSIS — J02.9 SORE THROAT: ICD-10-CM

## 2024-07-10 DIAGNOSIS — R53.83 FATIGUE, UNSPECIFIED TYPE: ICD-10-CM

## 2024-07-10 LAB
ALBUMIN SERPL BCG-MCNC: 3.8 G/DL (ref 3.5–5)
ALP SERPL-CCNC: 55 U/L (ref 34–104)
ALT SERPL W P-5'-P-CCNC: 19 U/L (ref 7–52)
ANION GAP SERPL CALCULATED.3IONS-SCNC: 7 MMOL/L (ref 4–13)
AST SERPL W P-5'-P-CCNC: 30 U/L (ref 13–39)
BASOPHILS # BLD AUTO: 0.08 THOUSANDS/ÂΜL (ref 0–0.1)
BASOPHILS NFR BLD AUTO: 1 % (ref 0–1)
BILIRUB SERPL-MCNC: 0.6 MG/DL (ref 0.2–1)
BUN SERPL-MCNC: 15 MG/DL (ref 5–25)
CALCIUM SERPL-MCNC: 8.7 MG/DL (ref 8.4–10.2)
CHLORIDE SERPL-SCNC: 102 MMOL/L (ref 96–108)
CO2 SERPL-SCNC: 25 MMOL/L (ref 21–32)
CREAT SERPL-MCNC: 0.85 MG/DL (ref 0.6–1.3)
CRP SERPL QL: 57 MG/L
EOSINOPHIL # BLD AUTO: 0.15 THOUSAND/ÂΜL (ref 0–0.61)
EOSINOPHIL NFR BLD AUTO: 2 % (ref 0–6)
ERYTHROCYTE [DISTWIDTH] IN BLOOD BY AUTOMATED COUNT: 12.8 % (ref 11.6–15.1)
GFR SERPL CREATININE-BSD FRML MDRD: 96 ML/MIN/1.73SQ M
GLUCOSE SERPL-MCNC: 123 MG/DL (ref 65–140)
HCT VFR BLD AUTO: 38 % (ref 36.5–49.3)
HGB BLD-MCNC: 12.7 G/DL (ref 12–17)
IMM GRANULOCYTES # BLD AUTO: 0.12 THOUSAND/UL (ref 0–0.2)
IMM GRANULOCYTES NFR BLD AUTO: 1 % (ref 0–2)
LYMPHOCYTES # BLD AUTO: 1.07 THOUSANDS/ÂΜL (ref 0.6–4.47)
LYMPHOCYTES NFR BLD AUTO: 11 % (ref 14–44)
MCH RBC QN AUTO: 28.5 PG (ref 26.8–34.3)
MCHC RBC AUTO-ENTMCNC: 33.4 G/DL (ref 31.4–37.4)
MCV RBC AUTO: 85 FL (ref 82–98)
MONOCYTES # BLD AUTO: 0.93 THOUSAND/ÂΜL (ref 0.17–1.22)
MONOCYTES NFR BLD AUTO: 10 % (ref 4–12)
NEUTROPHILS # BLD AUTO: 7.46 THOUSANDS/ÂΜL (ref 1.85–7.62)
NEUTS SEG NFR BLD AUTO: 75 % (ref 43–75)
NRBC BLD AUTO-RTO: 0 /100 WBCS
PLATELET # BLD AUTO: 234 THOUSANDS/UL (ref 149–390)
PMV BLD AUTO: 11 FL (ref 8.9–12.7)
POTASSIUM SERPL-SCNC: 4.4 MMOL/L (ref 3.5–5.3)
PROT SERPL-MCNC: 7.4 G/DL (ref 6.4–8.4)
RBC # BLD AUTO: 4.46 MILLION/UL (ref 3.88–5.62)
S PYO AG THROAT QL: NEGATIVE
SARS-COV-2 AG UPPER RESP QL IA: NEGATIVE
SODIUM SERPL-SCNC: 134 MMOL/L (ref 135–147)
TSH SERPL DL<=0.05 MIU/L-ACNC: 0.89 UIU/ML (ref 0.45–4.5)
VALID CONTROL: NORMAL
WBC # BLD AUTO: 9.81 THOUSAND/UL (ref 4.31–10.16)

## 2024-07-10 PROCEDURE — 86618 LYME DISEASE ANTIBODY: CPT | Performed by: FAMILY MEDICINE

## 2024-07-10 PROCEDURE — 36415 COLL VENOUS BLD VENIPUNCTURE: CPT | Performed by: FAMILY MEDICINE

## 2024-07-10 PROCEDURE — 86664 EPSTEIN-BARR NUCLEAR ANTIGEN: CPT | Performed by: FAMILY MEDICINE

## 2024-07-10 PROCEDURE — 85025 COMPLETE CBC W/AUTO DIFF WBC: CPT | Performed by: FAMILY MEDICINE

## 2024-07-10 PROCEDURE — 87811 SARS-COV-2 COVID19 W/OPTIC: CPT | Performed by: FAMILY MEDICINE

## 2024-07-10 PROCEDURE — 86663 EPSTEIN-BARR ANTIBODY: CPT | Performed by: FAMILY MEDICINE

## 2024-07-10 PROCEDURE — 86747 PARVOVIRUS ANTIBODY: CPT | Performed by: FAMILY MEDICINE

## 2024-07-10 PROCEDURE — 86140 C-REACTIVE PROTEIN: CPT | Performed by: FAMILY MEDICINE

## 2024-07-10 PROCEDURE — 87070 CULTURE OTHR SPECIMN AEROBIC: CPT | Performed by: FAMILY MEDICINE

## 2024-07-10 PROCEDURE — 86665 EPSTEIN-BARR CAPSID VCA: CPT | Performed by: FAMILY MEDICINE

## 2024-07-10 PROCEDURE — 99214 OFFICE O/P EST MOD 30 MIN: CPT | Performed by: FAMILY MEDICINE

## 2024-07-10 PROCEDURE — 80053 COMPREHEN METABOLIC PANEL: CPT | Performed by: FAMILY MEDICINE

## 2024-07-10 PROCEDURE — 87880 STREP A ASSAY W/OPTIC: CPT | Performed by: FAMILY MEDICINE

## 2024-07-10 PROCEDURE — 84443 ASSAY THYROID STIM HORMONE: CPT | Performed by: FAMILY MEDICINE

## 2024-07-10 NOTE — PROGRESS NOTES
Ambulatory Visit  Name: Haroldo Abreu      : 1965      MRN: [id number]  Encounter Provider: Sherlyn Victoria DO  Encounter Date: 7/10/2024   Encounter department: [facility] CARE    Assessment & Plan   1. Weakness generalized  -     POCT Rapid Covid Ag  -     CBC and differential  -     EBV acute panel  -     Comprehensive metabolic panel  -     Lyme Total AB W Reflex to IGM/IGG  -     Parvovirus B19 antibody, IgG and IgM  -     C-reactive protein  2. Sore throat  -     POCT rapid ANTIGEN strepA  -     Throat culture  -     CBC and differential  -     EBV acute panel  -     Comprehensive metabolic panel  -     Lyme Total AB W Reflex to IGM/IGG  -     Parvovirus B19 antibody, IgG and IgM  -     C-reactive protein  3. Fatigue, unspecified type  -     CBC and differential  -     EBV acute panel  -     Comprehensive metabolic panel  -     Lyme Total AB W Reflex to IGM/IGG  -     Parvovirus B19 antibody, IgG and IgM  -     C-reactive protein  4. Arthralgia, unspecified joint  -     CBC and differential  -     EBV acute panel  -     Comprehensive metabolic panel  -     Lyme Total AB W Reflex to IGM/IGG  -     Parvovirus B19 antibody, IgG and IgM  -     C-reactive protein  Pt with non-specific viral illness   Rapid strep and covid ag in office today were negative  Will send out throat culture   Check EBV, parvo, lyme, cbc and crp  Call with results.   Patient to drink fluids, advil for joint pain and call me if any sx worsen or if he develops any new sx at all.       History of Present Illness     HPI    Patient is a 58 year old male with hypertension, GERD who is being seen today for complaint of sore throat. Started with just fatigue around end of . Then developed sore throat. Seemed to be better for a couple of days then recurred. Currently has sore throat, difficulty swallowing, fatigue and achey in joints.     No headaches.   No associated nasal congestion, rhinorrhea. No cough.  No shortness of breath.   No fever but did have chills last week.   Wrists feel achey. No swelling. Sometimes knees bother him.   No skin rash.     Wife had covid on 6/17/24.     Patient did home covid test and this was negative.     Review of Systems  Past Medical History:   Diagnosis Date   • Cancer (HCC) 2018   • Colon polyp    • COVID-19 12/2020   • GERD (gastroesophageal reflux disease)    • HL (hearing loss) 1974    Tumor   • Melanoma (HCC) 2017    Dermatology and Moh's surgery in [place]. [place]; Right lateral upper back   • Pneumonia 2/21/2023    I have had a cough since early Deecember   • Testicular cancer (HCC) 2000    Dr monterroso and yony (urology)     Past Surgical History:   Procedure Laterality Date   • COLONOSCOPY      January 2021 small cecal serrated adenoma, sigmoid diverticulosis, internal hemorrhoids   • ORCHIECTOMY Right    • SKIN CANCER EXCISION     • TUMOR EXCISION      right ear as a child   • UPPER GASTROINTESTINAL ENDOSCOPY      January 2021: Schatzki ring biopsies negative for Johnson's or EOE, fundic gland polyps, mild gastritis negative for H pylori.   September 2014 Schatzki ring biopsies negative for Johnson's EOE or H pylori.     Family History   Problem Relation Age of Onset   • Diabetes Mother    • Hypertension Mother    • Hypertension Father    • Heart disease Father    • Heart failure Father         Congestive   • No Known Problems Sister    • No Known Problems Brother    • No Known Problems Brother    • Colon polyps Neg Hx    • Colon cancer Neg Hx      Social History     Tobacco Use   • Smoking status: Never   • Smokeless tobacco: Never   Vaping Use   • Vaping status: Never Used   Substance and Sexual Activity   • Alcohol use: Yes     Alcohol/week: 1.0 standard drink of alcohol     Types: 1 Cans of beer per week     Comment: socially   • Drug use: Never   • Sexual activity: Yes     Partners: Female     Birth control/protection: None     Current Outpatient Medications on  "File Prior to Visit   Medication Sig   • ketoconazole (NIZORAL) 2 % cream Apply 2x/day to bottom of feet and in between toes for 3 weeks. Apply on facial redness/rash as needed.   • lisinopril (ZESTRIL) 20 mg tablet Take 1 tablet (20 mg total) by mouth daily   • Multiple Vitamins-Minerals (MULTIVITAMIN ADULTS PO) Take 1 capsule by mouth daily   • RABEprazole (ACIPHEX) 20 MG tablet Take 1 tablet (20 mg total) by mouth daily   • tadalafil (CIALIS) 5 MG tablet Take 1 tablet (5 mg total) by mouth in the morning   • triamcinolone (KENALOG) 0.025 % cream Apply topically 2 (two) times a day Apply sparingly 2-4 times a day for up to one week. (Patient taking differently: Apply topically as needed Apply sparingly 2-4 times a day for up to one week.)     No Known Allergies  Immunization History   Administered Date(s) Administered   • COVID-19 PFIZER VACCINE 0.3 ML IM 03/18/2021, 04/12/2021, 12/06/2021   • INFLUENZA 10/20/2022   • Influenza Quadrivalent Recombinant Preservative Free IM 10/28/2021   • Tdap 01/14/2022   • Zoster Vaccine Recombinant 05/28/2024     Objective     /73   Pulse 87   Temp 98.9 °F (37.2 °C)   Resp 18   Ht 5' 8.5\" (1.74 m)   Wt 122 kg (268 lb 3.2 oz)   SpO2 95%   BMI 40.19 kg/m²     Physical Exam  Vitals and nursing note reviewed.   Constitutional:       General: He is not in acute distress.     Appearance: Normal appearance. He is obese. He is ill-appearing. He is not toxic-appearing or diaphoretic.   HENT:      Head: Normocephalic and atraumatic.      Right Ear: Tympanic membrane normal.      Left Ear: Tympanic membrane normal.      Nose: Nose normal.      Mouth/Throat:      Mouth: Mucous membranes are moist.      Pharynx: No oropharyngeal exudate or posterior oropharyngeal erythema.      Comments: No tonsillar swelling or exudate  Eyes:      Conjunctiva/sclera: Conjunctivae normal.   Cardiovascular:      Rate and Rhythm: Normal rate and regular rhythm.      Heart sounds: No murmur " heard.  Pulmonary:      Effort: Pulmonary effort is normal. No respiratory distress.      Breath sounds: No wheezing, rhonchi or rales.   Abdominal:      General: Abdomen is flat. Bowel sounds are normal.      Palpations: Abdomen is soft.   Musculoskeletal:         General: No swelling or deformity.      Cervical back: Normal range of motion and neck supple.      Right lower leg: No edema.      Left lower leg: No edema.   Lymphadenopathy:      Cervical: No cervical adenopathy.   Skin:     General: Skin is warm and dry.      Findings: No rash.   Neurological:      General: No focal deficit present.      Mental Status: He is alert and oriented to person, place, and time.      Deep Tendon Reflexes: Reflexes normal.   Psychiatric:         Mood and Affect: Mood normal.

## 2024-07-11 ENCOUNTER — TELEPHONE (OUTPATIENT)
Dept: FAMILY MEDICINE CLINIC | Facility: CLINIC | Age: 59
End: 2024-07-11

## 2024-07-11 DIAGNOSIS — B27.90 PHARYNGITIS DUE TO INFECTIOUS MONONUCLEOSIS: Primary | ICD-10-CM

## 2024-07-11 LAB
B BURGDOR IGG+IGM SER QL IA: NEGATIVE
B19V IGG SER IA-ACNC: 5.7 INDEX (ref 0–0.8)
B19V IGM SER IA-ACNC: 0.3 INDEX (ref 0–0.8)
EBV NA IGG SER IA-ACNC: >600 U/ML (ref 0–17.9)
EBV VCA IGG SER IA-ACNC: >600 U/ML (ref 0–17.9)
EBV VCA IGM SER IA-ACNC: <36 U/ML (ref 0–35.9)
INTERPRETATION: ABNORMAL

## 2024-07-11 RX ORDER — METHYLPREDNISOLONE 4 MG/1
TABLET ORAL
Qty: 21 EACH | Refills: 0 | Status: SHIPPED | OUTPATIENT
Start: 2024-07-11 | End: 2024-07-16

## 2024-07-11 NOTE — TELEPHONE ENCOUNTER
[facility]  Let patient know that his throat culture was negative.  His test for mono indicate infection with EBV at some time in past   however cannot predict the timing of the infection.since his Ig G antibodies are so elevated, and he does not recall ever having had mono, I presume that he does have mono and this has just been going on for a little while. This also coincides with history that wife provided with this starting a couple of weeks ago and waxing and waning since then.  For the sore throat and difficulty swallowing, could send over steroid pack if he is interested.      Patient aware and verbalized understanding of results, agreeable to steroid pack being sent to [facility] [phone]. Patient asking if he should be taking an precautions or restrictions?

## 2024-07-11 NOTE — TELEPHONE ENCOUNTER
[facility]  Call patient to let him know that cbc was normal. CMP normal. Lyme negative  His CRP (inflammatory marker) however was extremely elevated suggesting infection or inflammation in the body.  Still waiting for throat culture results as well as his parvo and EBV tests to result.  How is he feeling today?      Still sleeping a lot, sore throat still sore, claims he still feels weak but not as bad. A little improvement. Informed him that we received a few results back. That we will give him a call once we receive results for throat culture, parvo, and EBV.

## 2024-07-12 LAB — BACTERIA THROAT CULT: NORMAL

## 2024-07-16 ENCOUNTER — APPOINTMENT (OUTPATIENT)
Dept: LAB | Facility: HOSPITAL | Age: 59
End: 2024-07-16
Payer: COMMERCIAL

## 2024-07-16 ENCOUNTER — OFFICE VISIT (OUTPATIENT)
Dept: FAMILY MEDICINE CLINIC | Facility: CLINIC | Age: 59
End: 2024-07-16
Payer: COMMERCIAL

## 2024-07-16 ENCOUNTER — TELEPHONE (OUTPATIENT)
Dept: FAMILY MEDICINE CLINIC | Facility: CLINIC | Age: 59
End: 2024-07-16

## 2024-07-16 VITALS
SYSTOLIC BLOOD PRESSURE: 120 MMHG | DIASTOLIC BLOOD PRESSURE: 80 MMHG | HEART RATE: 87 BPM | TEMPERATURE: 99.4 F | WEIGHT: 268 LBS | RESPIRATION RATE: 18 BRPM | BODY MASS INDEX: 40.16 KG/M2 | OXYGEN SATURATION: 97 %

## 2024-07-16 DIAGNOSIS — R53.83 FATIGUE, UNSPECIFIED TYPE: ICD-10-CM

## 2024-07-16 DIAGNOSIS — R00.2 PALPITATIONS: ICD-10-CM

## 2024-07-16 DIAGNOSIS — R79.82 ELEVATED C-REACTIVE PROTEIN (CRP): Primary | ICD-10-CM

## 2024-07-16 DIAGNOSIS — R76.0 ELEVATED EBV ANTIBODY TITER: ICD-10-CM

## 2024-07-16 DIAGNOSIS — J02.9 SORE THROAT: ICD-10-CM

## 2024-07-16 DIAGNOSIS — M25.541 ARTHRALGIA OF BOTH HANDS: ICD-10-CM

## 2024-07-16 DIAGNOSIS — M25.542 ARTHRALGIA OF BOTH HANDS: ICD-10-CM

## 2024-07-16 LAB
ANA SER QL IA: NEGATIVE
CRP SERPL QL: 100.6 MG/L
ERYTHROCYTE [DISTWIDTH] IN BLOOD BY AUTOMATED COUNT: 12.7 % (ref 11.6–15.1)
HCT VFR BLD AUTO: 34.5 % (ref 36.5–49.3)
HGB BLD-MCNC: 11.5 G/DL (ref 12–17)
MCH RBC QN AUTO: 28.1 PG (ref 26.8–34.3)
MCHC RBC AUTO-ENTMCNC: 33.3 G/DL (ref 31.4–37.4)
MCV RBC AUTO: 84 FL (ref 82–98)
PLATELET # BLD AUTO: 278 THOUSANDS/UL (ref 149–390)
PMV BLD AUTO: 9.6 FL (ref 8.9–12.7)
RBC # BLD AUTO: 4.09 MILLION/UL (ref 3.88–5.62)
URATE SERPL-MCNC: 6.3 MG/DL (ref 3.5–8.5)
WBC # BLD AUTO: 14.71 THOUSAND/UL (ref 4.31–10.16)

## 2024-07-16 PROCEDURE — 86235 NUCLEAR ANTIGEN ANTIBODY: CPT | Performed by: FAMILY MEDICINE

## 2024-07-16 PROCEDURE — 99214 OFFICE O/P EST MOD 30 MIN: CPT | Performed by: FAMILY MEDICINE

## 2024-07-16 PROCEDURE — 86200 CCP ANTIBODY: CPT | Performed by: FAMILY MEDICINE

## 2024-07-16 PROCEDURE — 93000 ELECTROCARDIOGRAM COMPLETE: CPT | Performed by: FAMILY MEDICINE

## 2024-07-16 PROCEDURE — 36415 COLL VENOUS BLD VENIPUNCTURE: CPT | Performed by: FAMILY MEDICINE

## 2024-07-16 PROCEDURE — 86140 C-REACTIVE PROTEIN: CPT | Performed by: FAMILY MEDICINE

## 2024-07-16 PROCEDURE — 84550 ASSAY OF BLOOD/URIC ACID: CPT | Performed by: FAMILY MEDICINE

## 2024-07-16 PROCEDURE — 85027 COMPLETE CBC AUTOMATED: CPT | Performed by: FAMILY MEDICINE

## 2024-07-16 PROCEDURE — 86038 ANTINUCLEAR ANTIBODIES: CPT | Performed by: FAMILY MEDICINE

## 2024-07-16 NOTE — TELEPHONE ENCOUNTER
Let's hold off on this until he is feeling better.   Sometimes second shingles vaccine can cause some flu like sx.

## 2024-07-16 NOTE — TELEPHONE ENCOUNTER
Patient asking if he should still receive second dose of shingles on 7/29/2024 scheduled as Nurse visit.

## 2024-07-16 NOTE — PROGRESS NOTES
Ambulatory Visit  Name: Haroldo Abreu      : 1965      MRN: [id number]  Encounter Provider: Sherlyn Victoria DO  Encounter Date: 2024   Encounter department: [facility] PRIMARY CARE    Assessment & Plan   1. Elevated C-reactive protein (CRP)  -     C-reactive protein  CRP elevated at 57 on his recent labs suggestive of infection/inflammation in body.   His CBC was normal.   ? Rheumatologic condition.   Will repeat this.   2. Elevated EBV antibody titer  Patient with elevated EBV IgG on recent labs. His IgM was negative. This is suggestive of previous infection. Given that sx started in , was not sure if current sx were related to subacute EBV but in further review of chart, found that he had IgG elevation back in  so this is not new infection.   Discussed this with patient.   3. Arthralgia of both hands  -     CBC and Platelet  -     Cyclic citrul peptide antibody, IgG  -     Sjogren's Antibodies  -     Uric acid  -     BANDAR Screen w/ Reflex to Titer/Pattern  Ongoing arthralgias of both hands and wrists.   One could make the case for some synovitis MCP joints right hand  Check anti -CCP, BANDAR, sjogrens and will repeat his CRP  His lyme ws negative.   4. Fatigue, unspecified type  5. Sore throat  -     Ambulatory Referral to Otolaryngology; Future  Sore throat persists.   Possible viral infection   His throat culture was negative for strep  Covid antigen was negative.   Exam with no erythema, tonsillar swelling exudate or abscess   Feels that his reflux is controlled on the aciphex   Recent TSH normal and no anterior neck tenderness so doubt this is thyroiditis.   Will refer to ENT for further evaluation of the persistent sore throat.   6. Palpitations  -     POCT ECG  Describes rapid heart beat when active. No associated chest pain or shortness of breath  EKG in office today. NSR. LAD. No acute changes  Send patient for exercise stress test.   Stay hydrated.        History of  Present Illness     HPI  Pt is a 58 year old male with hypertension, GERD being seen today for f/u visit.     Seen on 7/10/24 for complaint of sore throat, fatigue and joint pains. Ongoing since end of June intermittently.   His exam was unremarkable.   Rapid strep in office was negative. COVID Ag in office negative.   Throat culture sent out and was negative as well.   His CBC and CMP were normal.   TSH was normal.   Lyme titer negative  Parvovirus showed prior infection  His CRP was markedly elevated at 57  EBV titer showed normal IgM antibodies at < 36. The IgG Ab were > 600   Discussed that results indicative of previous infection, timing of which is not clear. Could be subacute but in review of prior labs, had elevated IgG in 2022 as well?     Was given course of PO steroids to see if this would help his sore throat and difficulty swallowing.  Today, reports that he is still feeling tired and has a sore throat. Sore throat worse when he swallows.     Has not had any fever or chills.   Some achiness in joints--hands and wrists though this is slightly improved from when sx began. Some stiffness. Worse in AM. No other joint pain.   Appetite okay. No weight loss.   No skin rash  No rhinorrhea.   No cough.   No chest pain.     Increased heart rate when he is active or exerting himself. Does not get any pain in chest and does not feel short of breath. No rapid haeartbeat or palpitations presently.   No nausea, vomiting, abdominal pain, diarrhea or constipation.   Has reflux but feels that it is well controlled on his aciphex.     Review of Systems  Past Medical History:   Diagnosis Date   • Cancer (HCC) 2018   • Colon polyp    • COVID-19 12/2020   • GERD (gastroesophageal reflux disease)    • HL (hearing loss) 1974    Tumor   • Melanoma (HCC) 2017    Dermatology and Moh's surgery in [place]. [place]; Right lateral upper back   • Pneumonia 2/21/2023    I have had a cough since early Deecember   • Testicular  cancer (HCC) 2000    Dr monterroso and yony (urology)     Past Surgical History:   Procedure Laterality Date   • COLONOSCOPY      January 2021 small cecal serrated adenoma, sigmoid diverticulosis, internal hemorrhoids   • ORCHIECTOMY Right    • SKIN CANCER EXCISION     • TUMOR EXCISION      right ear as a child   • UPPER GASTROINTESTINAL ENDOSCOPY      January 2021: Schatzki ring biopsies negative for Johnson's or EOE, fundic gland polyps, mild gastritis negative for H pylori.   September 2014 Schatzki ring biopsies negative for Johnson's EOE or H pylori.     Family History   Problem Relation Age of Onset   • Diabetes Mother    • Hypertension Mother    • Hypertension Father    • Heart disease Father    • Heart failure Father         Congestive   • No Known Problems Sister    • No Known Problems Brother    • No Known Problems Brother    • Colon polyps Neg Hx    • Colon cancer Neg Hx      Social History     Tobacco Use   • Smoking status: Never   • Smokeless tobacco: Never   Vaping Use   • Vaping status: Never Used   Substance and Sexual Activity   • Alcohol use: Yes     Alcohol/week: 1.0 standard drink of alcohol     Types: 1 Cans of beer per week     Comment: socially   • Drug use: Never   • Sexual activity: Yes     Partners: Female     Birth control/protection: None     Current Outpatient Medications on File Prior to Visit   Medication Sig   • ketoconazole (NIZORAL) 2 % cream Apply 2x/day to bottom of feet and in between toes for 3 weeks. Apply on facial redness/rash as needed.   • lisinopril (ZESTRIL) 20 mg tablet Take 1 tablet (20 mg total) by mouth daily   • Multiple Vitamins-Minerals (MULTIVITAMIN ADULTS PO) Take 1 capsule by mouth daily   • RABEprazole (ACIPHEX) 20 MG tablet Take 1 tablet (20 mg total) by mouth daily   • tadalafil (CIALIS) 5 MG tablet Take 1 tablet (5 mg total) by mouth in the morning   • triamcinolone (KENALOG) 0.025 % cream Apply topically 2 (two) times a day Apply sparingly 2-4 times a day  for up to one week. (Patient taking differently: Apply topically as needed Apply sparingly 2-4 times a day for up to one week.)   • [DISCONTINUED] methylPREDNISolone 4 MG tablet therapy pack Use as directed on package (Patient not taking: Reported on 7/16/2024)     No Known Allergies  Immunization History   Administered Date(s) Administered   • COVID-19 PFIZER VACCINE 0.3 ML IM 03/18/2021, 04/12/2021, 12/06/2021   • INFLUENZA 10/20/2022   • Influenza Quadrivalent Recombinant Preservative Free IM 10/28/2021   • Tdap 01/14/2022   • Zoster Vaccine Recombinant 05/28/2024     Objective     /80   Pulse 87   Temp 99.4 °F (37.4 °C) (Tympanic)   Resp 18   Wt 122 kg (268 lb)   SpO2 97%   BMI 40.16 kg/m²     Physical Exam  Vitals and nursing note reviewed.   Constitutional:       General: He is not in acute distress.     Appearance: Normal appearance. He is obese. He is not ill-appearing, toxic-appearing or diaphoretic.   HENT:      Head: Normocephalic and atraumatic.      Left Ear: Tympanic membrane normal.      Ears:      Comments: Right TM with scarring. intact     Nose: Nose normal.      Mouth/Throat:      Mouth: Mucous membranes are moist.   Eyes:      Extraocular Movements: Extraocular movements intact.      Conjunctiva/sclera: Conjunctivae normal.      Pupils: Pupils are equal, round, and reactive to light.   Neck:      Vascular: No carotid bruit.      Comments: No thyromegaly or tenderness of anterior neck  Cardiovascular:      Rate and Rhythm: Normal rate and regular rhythm.      Heart sounds: No murmur heard.     No friction rub. No gallop.   Pulmonary:      Effort: Pulmonary effort is normal.      Breath sounds: Normal breath sounds.   Abdominal:      General: Abdomen is flat. Bowel sounds are normal.      Palpations: Abdomen is soft.   Musculoskeletal:         General: No deformity. Normal range of motion.      Cervical back: Normal range of motion and neck supple. No rigidity or tenderness.      Right  lower leg: No edema.      Left lower leg: No edema.      Comments: Possible mild synovitis/swelling of right 2nd/3rd MCP joints. No tenderness   Lymphadenopathy:      Cervical: No cervical adenopathy.   Skin:     Findings: No rash.   Neurological:      General: No focal deficit present.      Mental Status: He is oriented to person, place, and time.   Psychiatric:         Mood and Affect: Mood normal.

## 2024-07-16 NOTE — PATIENT INSTRUCTIONS
I would like you to see  ENT for the persistent sore throat.   I would like you to have some additional labs done to look into the joint pain you are having. I will call or message you with results.   Lastly, get exercise stress test done.

## 2024-07-17 LAB
ENA SS-A AB SER-ACNC: <0.2 AI (ref 0–0.9)
ENA SS-B AB SER-ACNC: <0.2 AI (ref 0–0.9)

## 2024-07-19 ENCOUNTER — APPOINTMENT (EMERGENCY)
Dept: CT IMAGING | Facility: HOSPITAL | Age: 59
End: 2024-07-19
Payer: COMMERCIAL

## 2024-07-19 ENCOUNTER — HOSPITAL ENCOUNTER (EMERGENCY)
Facility: HOSPITAL | Age: 59
Discharge: HOME | End: 2024-07-19
Attending: EMERGENCY MEDICINE
Payer: COMMERCIAL

## 2024-07-19 VITALS
OXYGEN SATURATION: 97 % | TEMPERATURE: 97.9 F | SYSTOLIC BLOOD PRESSURE: 130 MMHG | DIASTOLIC BLOOD PRESSURE: 71 MMHG | RESPIRATION RATE: 18 BRPM | HEART RATE: 72 BPM

## 2024-07-19 DIAGNOSIS — J21.9 BRONCHIOLITIS: ICD-10-CM

## 2024-07-19 DIAGNOSIS — M25.50 ARTHRALGIA, UNSPECIFIED JOINT: Primary | ICD-10-CM

## 2024-07-19 DIAGNOSIS — R79.82 ELEVATED C-REACTIVE PROTEIN (CRP): ICD-10-CM

## 2024-07-19 DIAGNOSIS — J18.9 PNEUMONIA OF RIGHT LOWER LOBE DUE TO INFECTIOUS ORGANISM: Primary | ICD-10-CM

## 2024-07-19 DIAGNOSIS — R68.83 CHILLS: ICD-10-CM

## 2024-07-19 LAB
ALBUMIN SERPL BCG-MCNC: 3.3 G/DL (ref 3.5–5)
ALP SERPL-CCNC: 68 U/L (ref 34–104)
ALT SERPL W P-5'-P-CCNC: 20 U/L (ref 7–52)
ANION GAP SERPL CALCULATED.3IONS-SCNC: 5 MMOL/L (ref 4–13)
APTT PPP: 33 SECONDS (ref 23–37)
AST SERPL W P-5'-P-CCNC: 18 U/L (ref 13–39)
BASOPHILS # BLD AUTO: 0.1 THOUSANDS/ÂΜL (ref 0–0.1)
BASOPHILS NFR BLD AUTO: 1 % (ref 0–1)
BILIRUB SERPL-MCNC: 0.5 MG/DL (ref 0.2–1)
BILIRUB UR QL STRIP: NEGATIVE
BNP SERPL-MCNC: 79 PG/ML (ref 0–100)
BUN SERPL-MCNC: 15 MG/DL (ref 5–25)
CALCIUM ALBUM COR SERPL-MCNC: 9.5 MG/DL (ref 8.3–10.1)
CALCIUM SERPL-MCNC: 8.9 MG/DL (ref 8.4–10.2)
CARDIAC TROPONIN I PNL SERPL HS: 16 NG/L
CCP AB SER IA-ACNC: 1.2
CHLORIDE SERPL-SCNC: 101 MMOL/L (ref 96–108)
CLARITY UR: CLEAR
CO2 SERPL-SCNC: 30 MMOL/L (ref 21–32)
COLOR UR: YELLOW
CREAT SERPL-MCNC: 0.82 MG/DL (ref 0.6–1.3)
EOSINOPHIL # BLD AUTO: 0.24 THOUSAND/ÂΜL (ref 0–0.61)
EOSINOPHIL NFR BLD AUTO: 2 % (ref 0–6)
ERYTHROCYTE [DISTWIDTH] IN BLOOD BY AUTOMATED COUNT: 12.7 % (ref 11.6–15.1)
FLUAV RNA RESP QL NAA+PROBE: NEGATIVE
FLUBV RNA RESP QL NAA+PROBE: NEGATIVE
GFR SERPL CREATININE-BSD FRML MDRD: 97 ML/MIN/1.73SQ M
GLUCOSE SERPL-MCNC: 82 MG/DL (ref 65–140)
GLUCOSE UR STRIP-MCNC: NEGATIVE MG/DL
HCT VFR BLD AUTO: 35.3 % (ref 36.5–49.3)
HGB BLD-MCNC: 11.5 G/DL (ref 12–17)
HGB UR QL STRIP.AUTO: NEGATIVE
IMM GRANULOCYTES # BLD AUTO: 0.24 THOUSAND/UL (ref 0–0.2)
IMM GRANULOCYTES NFR BLD AUTO: 2 % (ref 0–2)
INR PPP: 1.09 (ref 0.84–1.19)
KETONES UR STRIP-MCNC: NEGATIVE MG/DL
LACTATE SERPL-SCNC: 1.3 MMOL/L (ref 0.5–2)
LEUKOCYTE ESTERASE UR QL STRIP: NEGATIVE
LYMPHOCYTES # BLD AUTO: 1.61 THOUSANDS/ÂΜL (ref 0.6–4.47)
LYMPHOCYTES NFR BLD AUTO: 12 % (ref 14–44)
MAGNESIUM SERPL-MCNC: 2.1 MG/DL (ref 1.9–2.7)
MCH RBC QN AUTO: 28.1 PG (ref 26.8–34.3)
MCHC RBC AUTO-ENTMCNC: 32.6 G/DL (ref 31.4–37.4)
MCV RBC AUTO: 86 FL (ref 82–98)
MONOCYTES # BLD AUTO: 0.9 THOUSAND/ÂΜL (ref 0.17–1.22)
MONOCYTES NFR BLD AUTO: 6 % (ref 4–12)
NEUTROPHILS # BLD AUTO: 10.94 THOUSANDS/ÂΜL (ref 1.85–7.62)
NEUTS SEG NFR BLD AUTO: 77 % (ref 43–75)
NITRITE UR QL STRIP: NEGATIVE
NRBC BLD AUTO-RTO: 0 /100 WBCS
PH UR STRIP.AUTO: 7 [PH]
PHOSPHATE SERPL-MCNC: 2.5 MG/DL (ref 2.7–4.5)
PLATELET # BLD AUTO: 272 THOUSANDS/UL (ref 149–390)
PMV BLD AUTO: 9.7 FL (ref 8.9–12.7)
POTASSIUM SERPL-SCNC: 4.4 MMOL/L (ref 3.5–5.3)
PROCALCITONIN SERPL-MCNC: 0.07 NG/ML
PROT SERPL-MCNC: 7.2 G/DL (ref 6.4–8.4)
PROT UR STRIP-MCNC: NEGATIVE MG/DL
PROTHROMBIN TIME: 14.5 SECONDS (ref 11.6–14.5)
RBC # BLD AUTO: 4.09 MILLION/UL (ref 3.88–5.62)
RSV RNA RESP QL NAA+PROBE: NEGATIVE
SARS-COV-2 RNA RESP QL NAA+PROBE: NEGATIVE
SODIUM SERPL-SCNC: 136 MMOL/L (ref 135–147)
SP GR UR STRIP.AUTO: <1.005 (ref 1–1.03)
UROBILINOGEN UR STRIP-ACNC: <2 MG/DL
WBC # BLD AUTO: 14.03 THOUSAND/UL (ref 4.31–10.16)

## 2024-07-19 PROCEDURE — 84145 PROCALCITONIN (PCT): CPT | Performed by: PHYSICIAN ASSISTANT

## 2024-07-19 PROCEDURE — 71250 CT THORAX DX C-: CPT

## 2024-07-19 PROCEDURE — 96366 THER/PROPH/DIAG IV INF ADDON: CPT

## 2024-07-19 PROCEDURE — 85610 PROTHROMBIN TIME: CPT | Performed by: PHYSICIAN ASSISTANT

## 2024-07-19 PROCEDURE — 84484 ASSAY OF TROPONIN QUANT: CPT | Performed by: PHYSICIAN ASSISTANT

## 2024-07-19 PROCEDURE — 96375 TX/PRO/DX INJ NEW DRUG ADDON: CPT

## 2024-07-19 PROCEDURE — 80053 COMPREHEN METABOLIC PANEL: CPT | Performed by: PHYSICIAN ASSISTANT

## 2024-07-19 PROCEDURE — 86753 PROTOZOA ANTIBODY NOS: CPT | Performed by: PHYSICIAN ASSISTANT

## 2024-07-19 PROCEDURE — 96368 THER/DIAG CONCURRENT INF: CPT

## 2024-07-19 PROCEDURE — 93005 ELECTROCARDIOGRAM TRACING: CPT

## 2024-07-19 PROCEDURE — 83605 ASSAY OF LACTIC ACID: CPT | Performed by: PHYSICIAN ASSISTANT

## 2024-07-19 PROCEDURE — 96365 THER/PROPH/DIAG IV INF INIT: CPT

## 2024-07-19 PROCEDURE — 0241U HB NFCT DS VIR RESP RNA 4 TRGT: CPT | Performed by: PHYSICIAN ASSISTANT

## 2024-07-19 PROCEDURE — 83735 ASSAY OF MAGNESIUM: CPT | Performed by: PHYSICIAN ASSISTANT

## 2024-07-19 PROCEDURE — 99285 EMERGENCY DEPT VISIT HI MDM: CPT

## 2024-07-19 PROCEDURE — 81003 URINALYSIS AUTO W/O SCOPE: CPT | Performed by: PHYSICIAN ASSISTANT

## 2024-07-19 PROCEDURE — 85730 THROMBOPLASTIN TIME PARTIAL: CPT | Performed by: PHYSICIAN ASSISTANT

## 2024-07-19 PROCEDURE — 84100 ASSAY OF PHOSPHORUS: CPT | Performed by: PHYSICIAN ASSISTANT

## 2024-07-19 PROCEDURE — 83880 ASSAY OF NATRIURETIC PEPTIDE: CPT | Performed by: PHYSICIAN ASSISTANT

## 2024-07-19 PROCEDURE — 87040 BLOOD CULTURE FOR BACTERIA: CPT | Performed by: PHYSICIAN ASSISTANT

## 2024-07-19 PROCEDURE — 86666 EHRLICHIA ANTIBODY: CPT | Performed by: PHYSICIAN ASSISTANT

## 2024-07-19 PROCEDURE — 99285 EMERGENCY DEPT VISIT HI MDM: CPT | Performed by: PHYSICIAN ASSISTANT

## 2024-07-19 PROCEDURE — 36415 COLL VENOUS BLD VENIPUNCTURE: CPT | Performed by: PHYSICIAN ASSISTANT

## 2024-07-19 PROCEDURE — 85025 COMPLETE CBC W/AUTO DIFF WBC: CPT | Performed by: PHYSICIAN ASSISTANT

## 2024-07-19 RX ORDER — KETOROLAC TROMETHAMINE 30 MG/ML
15 INJECTION, SOLUTION INTRAMUSCULAR; INTRAVENOUS ONCE
Status: COMPLETED | OUTPATIENT
Start: 2024-07-19 | End: 2024-07-19

## 2024-07-19 RX ORDER — DOXYCYCLINE HYCLATE 100 MG/1
100 CAPSULE ORAL 2 TIMES DAILY
Qty: 20 CAPSULE | Refills: 0 | Status: SHIPPED | OUTPATIENT
Start: 2024-07-19 | End: 2024-07-29

## 2024-07-19 RX ORDER — ONDANSETRON 2 MG/ML
4 INJECTION INTRAMUSCULAR; INTRAVENOUS EVERY 6 HOURS PRN
Status: DISCONTINUED | OUTPATIENT
Start: 2024-07-19 | End: 2024-07-19 | Stop reason: HOSPADM

## 2024-07-19 RX ADMIN — SODIUM CHLORIDE, SODIUM LACTATE, POTASSIUM CHLORIDE, AND CALCIUM CHLORIDE 1000 ML: .6; .31; .03; .02 INJECTION, SOLUTION INTRAVENOUS at 12:52

## 2024-07-19 RX ADMIN — KETOROLAC TROMETHAMINE 15 MG: 30 INJECTION, SOLUTION INTRAMUSCULAR; INTRAVENOUS at 12:51

## 2024-07-19 RX ADMIN — DOXYCYCLINE 100 MG: 100 INJECTION, POWDER, LYOPHILIZED, FOR SOLUTION INTRAVENOUS at 13:34

## 2024-07-19 NOTE — ED PROVIDER NOTES
"History  Chief Complaint   Patient presents with    Weakness - Generalized     Pt states \"I've been sick for 3 weeks.\" Reports feeling weak, sob, palpitations, joint pain, joint swelling, sore throat. Denies fevers. Seen by PCP but no answers.      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  Patient states that his symptoms started with a flulike illness.  He home tested for COVID several times all tests were negative.  His symptoms did not resolve after the first week prompting him to follow-up with his PCP.  PCP evaluation includes negative Lyme testing, EBV testing consistent with previous infection, negative rheumatology work up at this time.  He states his SOB occurs with minimal exertion.  He denies orthopnea or PND.  HE also endorses joint pain and swelling.  He states his hands are swollen and sore.  He is having difficulty opening containers.  He says yesterday he had bilateral shoulder pain but at this time the pain has resolved.  He also states he had multiple danuta colored lesions on his upper arms last week, but they have resolved. His symptoms started with a sore throat, but at this time his sore throat has resolved.  He endorses generalized weakness, and per his wife he has been in bed or on the couch since the onset of symptoms which is unlike him.  He golfs often and is outdoors frequently, but denies known tick or bug bite.  He specifically denies n/v/d, fever, chills, abdominal pain, chest pain, LE edema or deep vessel pain, headache, neck pain, cough or increased sputum production    DDX includes tick borne illness, mycoplasma pna, CAP, CHF, covid19/long covid, or rheumatological " disease.          Prior to Admission Medications   Prescriptions Last Dose Informant Patient Reported? Taking?   Multiple Vitamins-Minerals (MULTIVITAMIN ADULTS PO)  Self Yes No   Sig: Take 1 capsule by mouth daily   RABEprazole (ACIPHEX) 20 MG tablet   No No   Sig: Take 1 tablet (20 mg total) by mouth daily   ketoconazole (NIZORAL) 2 % cream   No No   Sig: Apply 2x/day to bottom of feet and in between toes for 3 weeks. Apply on facial redness/rash as needed.   lisinopril (ZESTRIL) 20 mg tablet   No No   Sig: Take 1 tablet (20 mg total) by mouth daily   tadalafil (CIALIS) 5 MG tablet   No No   Sig: Take 1 tablet (5 mg total) by mouth in the morning   triamcinolone (KENALOG) 0.025 % cream  Self No No   Sig: Apply topically 2 (two) times a day Apply sparingly 2-4 times a day for up to one week.   Patient taking differently: Apply topically as needed Apply sparingly 2-4 times a day for up to one week.      Facility-Administered Medications: None       Past Medical History:   Diagnosis Date    Cancer (HCC) 2018    Colon polyp     COVID-19 12/2020    GERD (gastroesophageal reflux disease)     HL (hearing loss) 1974    Tumor    Melanoma ([facility]) 2017    Dermatology and Moh's surgery in [place]; Right lateral upper back    Pneumonia 2/21/2023    I have had a cough since early Deecember    Testicular cancer ([facility]) 2000    Dr monterroso and yony (urology)       Past Surgical History:   Procedure Laterality Date    COLONOSCOPY      January 2021 small cecal serrated adenoma, sigmoid diverticulosis, internal hemorrhoids    ORCHIECTOMY Right     SKIN CANCER EXCISION      TUMOR EXCISION      right ear as a child    UPPER GASTROINTESTINAL ENDOSCOPY      January 2021: Schatzki ring biopsies negative for Johnson's or EOE, fundic gland polyps, mild gastritis negative for H pylori.   September 2014 Schatzki ring biopsies negative for Johnson's EOE or H pylori.       Family History   Problem Relation Age of Onset    Diabetes  Mother     Hypertension Mother     Hypertension Father     Heart disease Father     Heart failure Father         Congestive    No Known Problems Sister     No Known Problems Brother     No Known Problems Brother     Colon polyps Neg Hx     Colon cancer Neg Hx      I have reviewed and agree with the history as documented.    E-Cigarette/Vaping    E-Cigarette Use Never User      E-Cigarette/Vaping Substances    Nicotine No     THC No     CBD No     Flavoring No     Other No     Unknown No      Social History     Tobacco Use    Smoking status: Never    Smokeless tobacco: Never   Vaping Use    Vaping status: Never Used   Substance Use Topics    Alcohol use: Yes     Alcohol/week: 1.0 standard drink of alcohol     Types: 1 Cans of beer per week     Comment: socially    Drug use: Never       Review of Systems   Constitutional:  Positive for activity change and fatigue. Negative for chills and fever.   HENT:  Negative for congestion, drooling, ear discharge, ear pain, postnasal drip, sinus pressure, sinus pain, sore throat and trouble swallowing.    Eyes: Negative.  Negative for pain and visual disturbance.   Respiratory:  Positive for shortness of breath. Negative for cough, chest tightness, wheezing and stridor.    Cardiovascular:  Positive for palpitations. Negative for chest pain and leg swelling.   Gastrointestinal:  Negative for abdominal distention, abdominal pain, constipation, diarrhea, nausea and vomiting.   Endocrine: Negative.  Negative for cold intolerance, heat intolerance, polydipsia, polyphagia and polyuria.   Genitourinary: Negative.  Negative for decreased urine volume, dysuria, flank pain, hematuria and urgency.   Musculoskeletal:  Positive for arthralgias, joint swelling and myalgias. Negative for back pain, neck pain and neck stiffness.   Skin:  Negative for color change and rash.   Allergic/Immunologic: Negative.    Neurological: Negative.  Negative for seizures, syncope, light-headedness and  headaches.   Hematological: Negative.    Psychiatric/Behavioral: Negative.     All other systems reviewed and are negative.      Physical Exam  Physical Exam  Vitals and nursing note reviewed.   Constitutional:       General: He is not in acute distress.     Appearance: Normal appearance. He is well-developed. He is ill-appearing. He is not toxic-appearing or diaphoretic.   HENT:      Head: Normocephalic and atraumatic.      Right Ear: Tympanic membrane, ear canal and external ear normal.      Left Ear: Tympanic membrane, ear canal and external ear normal.      Nose: Nose normal. No congestion.      Mouth/Throat:      Mouth: Mucous membranes are moist.      Pharynx: No oropharyngeal exudate or posterior oropharyngeal erythema.   Eyes:      General: No scleral icterus.     Conjunctiva/sclera: Conjunctivae normal.      Pupils: Pupils are equal, round, and reactive to light.   Cardiovascular:      Rate and Rhythm: Normal rate and regular rhythm.      Pulses: Normal pulses.      Heart sounds: Normal heart sounds. No murmur heard.  Pulmonary:      Effort: Pulmonary effort is normal. No respiratory distress.      Breath sounds: Normal breath sounds.   Abdominal:      General: Abdomen is flat. Bowel sounds are normal.      Palpations: Abdomen is soft.      Tenderness: There is no abdominal tenderness. There is no right CVA tenderness or left CVA tenderness.   Musculoskeletal:         General: No swelling. Normal range of motion.      Cervical back: Normal range of motion and neck supple.      Right lower leg: No edema.      Left lower leg: No edema.   Skin:     General: Skin is warm and dry.      Capillary Refill: Capillary refill takes less than 2 seconds.   Neurological:      General: No focal deficit present.      Mental Status: He is alert and oriented to person, place, and time.      Cranial Nerves: No cranial nerve deficit.   Psychiatric:         Mood and Affect: Mood normal.         Behavior: Behavior normal.          Vital Signs  ED Triage Vitals   Temperature Pulse Respirations Blood Pressure SpO2   07/19/24 1106 07/19/24 1106 07/19/24 1106 07/19/24 1106 07/19/24 1106   97.9 °F (36.6 °C) 103 20 147/87 98 %      Temp Source Heart Rate Source Patient Position - Orthostatic VS BP Location FiO2 (%)   07/19/24 1106 07/19/24 1106 07/19/24 1230 07/19/24 1106 --   Temporal Monitor Lying Left arm       Pain Score       07/19/24 1106       6           Vitals:    07/19/24 1106 07/19/24 1230 07/19/24 1330 07/19/24 1400   BP: 147/87 133/75 125/73 130/71   Pulse: 103 76 69 72   Patient Position - Orthostatic VS:  Lying Lying Sitting         Visual Acuity  Visual Acuity      Flowsheet Row Most Recent Value   L Pupil Size (mm) 3   R Pupil Size (mm) 3            ED Medications  Medications   lactated ringers bolus 1,000 mL (0 mL Intravenous Stopped 7/19/24 1429)   ketorolac (TORADOL) injection 15 mg (15 mg Intravenous Given 7/19/24 1251)   doxycycline (VIBRAMYCIN) 100 mg in sodium chloride 0.9 % 100 mL IVPB (0 mg Intravenous Stopped 7/19/24 1448)       Diagnostic Studies  Results Reviewed       Procedure Component Value Units Date/Time    UA w Reflex to Microscopic w Reflex to Culture [[id number]]  (Abnormal) Collected: 07/19/24 1339    Lab Status: Final result Specimen: Urine, Clean Catch Updated: 07/19/24 1408     Color, UA Yellow     Clarity, UA Clear     Specific Gravity, UA <1.005     pH, UA 7.0     Leukocytes, UA Negative     Nitrite, UA Negative     Protein, UA Negative mg/dl      Glucose, UA Negative mg/dl      Ketones, UA Negative mg/dl      Urobilinogen, UA <2.0 mg/dl      Bilirubin, UA Negative     Occult Blood, UA Negative    FLU/RSV/COVID - if FLU/RSV clinically relevant [[id number]]  (Normal) Collected: 07/19/24 1246    Lab Status: Final result Specimen: Nares from Nose Updated: 07/19/24 1338     SARS-CoV-2 Negative     INFLUENZA A PCR Negative     INFLUENZA B PCR Negative     RSV PCR Negative    Narrative:      FOR  PEDIATRIC PATIENTS - copy/paste COVID Guidelines URL to browser: [email or web address]    SARS-CoV-2 assay is a Nucleic Acid Amplification assay intended for the  qualitative detection of nucleic acid from SARS-CoV-2 in nasopharyngeal  swabs. Results are for the presumptive identification of SARS-CoV-2 RNA.    Positive results are indicative of infection with SARS-CoV-2, the virus  causing COVID-19, but do not rule out bacterial infection or co-infection  with other viruses. Laboratories within the [place] and its  territories are required to report all positive results to the appropriate  public health authorities. Negative results do not preclude SARS-CoV-2  infection and should not be used as the sole basis for treatment or other  patient management decisions. Negative results must be combined with  clinical observations, patient history, and epidemiological information.  This test has not been FDA cleared or approved.    This test has been authorized by FDA under an Emergency Use Authorization  (EUA). This test is only authorized for the duration of time the  declaration that circumstances exist justifying the authorization of the  emergency use of an in vitro diagnostic tests for detection of SARS-CoV-2  virus and/or diagnosis of COVID-19 infection under section 564(b)(1) of  the Act, 21 U.S.C. 360bbb-3(b)(1), unless the authorization is terminated  or revoked sooner. The test has been validated but independent review by FDA  and CLIA is pending.    Test performed using [employer] GeneXpert: This RT-PCR assay targets N2,  a region unique to SARS-CoV-2. A conserved region in the E-gene was chosen  for pan-Sarbecovirus detection which includes SARS-CoV-2.    According to CMS-2020-01-R, this platform meets the definition of high-throughput technology.    Procalcitonin [[id number]]  (Normal) Collected: 07/19/24 1246    Lab Status: Final result Specimen: Blood  from Arm, Left Updated: 07/19/24 1327     Procalcitonin 0.07 ng/ml     HS Troponin 0hr (reflex protocol) [[id number]]  (Normal) Collected: 07/19/24 1246    Lab Status: Final result Specimen: Blood from Arm, Left Updated: 07/19/24 1326     hs TnI 0hr 16 ng/L     B-Type Natriuretic Peptide(BNP) [[id number]]  (Normal) Collected: 07/19/24 1246    Lab Status: Final result Specimen: Blood from Arm, Left Updated: 07/19/24 1325     BNP 79 pg/mL     Comprehensive metabolic panel [[id number]]  (Abnormal) Collected: 07/19/24 1246    Lab Status: Final result Specimen: Blood from Arm, Left Updated: 07/19/24 1319     Sodium 136 mmol/L      Potassium 4.4 mmol/L      Chloride 101 mmol/L      CO2 30 mmol/L      ANION GAP 5 mmol/L      BUN 15 mg/dL      Creatinine 0.82 mg/dL      Glucose 82 mg/dL      Calcium 8.9 mg/dL      Corrected Calcium 9.5 mg/dL      AST 18 U/L      ALT 20 U/L      Alkaline Phosphatase 68 U/L      Total Protein 7.2 g/dL      Albumin 3.3 g/dL      Total Bilirubin 0.50 mg/dL      eGFR 97 ml/min/1.73sq m     Narrative:      National Kidney Disease Foundation guidelines for Chronic Kidney Disease (CKD):     Stage 1 with normal or high GFR (GFR > 90 mL/min/1.73 square meters)    Stage 2 Mild CKD (GFR = 60-89 mL/min/1.73 square meters)    Stage 3A Moderate CKD (GFR = 45-59 mL/min/1.73 square meters)    Stage 3B Moderate CKD (GFR = 30-44 mL/min/1.73 square meters)    Stage 4 Severe CKD (GFR = 15-29 mL/min/1.73 square meters)    Stage 5 End Stage CKD (GFR <15 mL/min/1.73 square meters)  Note: GFR calculation is accurate only with a steady state creatinine    Phosphorus [[id number]]  (Abnormal) Collected: 07/19/24 1246    Lab Status: Final result Specimen: Blood from Arm, Left Updated: 07/19/24 1319     Phosphorus 2.5 mg/dL     Magnesium [[id number]]  (Normal) Collected: 07/19/24 1246    Lab Status: Final result Specimen: Blood from Arm, Left Updated: 07/19/24 1319     Magnesium 2.1 mg/dL     Lactic acid [[id number]]   (Normal) Collected: 07/19/24 1246    Lab Status: Final result Specimen: Blood from Arm, Left Updated: 07/19/24 1316     LACTIC ACID 1.3 mmol/L     Narrative:      Result may be elevated if tourniquet was used during collection.    Protime-INR [[id number]]  (Normal) Collected: 07/19/24 1246    Lab Status: Final result Specimen: Blood from Arm, Left Updated: 07/19/24 1314     Protime 14.5 seconds      INR 1.09    APTT [[id number]]  (Normal) Collected: 07/19/24 1246    Lab Status: Final result Specimen: Blood from Arm, Left Updated: 07/19/24 1314     PTT 33 seconds     CBC and differential [[id number]]  (Abnormal) Collected: 07/19/24 1246    Lab Status: Final result Specimen: Blood from Arm, Left Updated: 07/19/24 1302     WBC 14.03 Thousand/uL      RBC 4.09 Million/uL      Hemoglobin 11.5 g/dL      Hematocrit 35.3 %      MCV 86 fL      MCH 28.1 pg      MCHC 32.6 g/dL      RDW 12.7 %      MPV 9.7 fL      Platelets 272 Thousands/uL      nRBC 0 /100 WBCs      Segmented % 77 %      Immature Grans % 2 %      Lymphocytes % 12 %      Monocytes % 6 %      Eosinophils Relative 2 %      Basophils Relative 1 %      Absolute Neutrophils 10.94 Thousands/µL      Absolute Immature Grans 0.24 Thousand/uL      Absolute Lymphocytes 1.61 Thousands/µL      Absolute Monocytes 0.90 Thousand/µL      Eosinophils Absolute 0.24 Thousand/µL      Basophils Absolute 0.10 Thousands/µL     Blood culture #1 [[id number]] Collected: 07/19/24 1246    Lab Status: In process Specimen: Blood from Arm, Left Updated: 07/19/24 1259    Ehrlichia antibody panel [[id number]] Collected: 07/19/24 1254    Lab Status: In process Specimen: Blood from Arm, Left Updated: 07/19/24 1259    Babesia microti antibody, IgG & Igm [[id number]] Collected: 07/19/24 1254    Lab Status: In process Specimen: Blood from Arm, Left Updated: 07/19/24 1259                   CT chest without contrast   Final Result by Ivory Anderson MD (07/19 1311)      Mild paraspinal ground glass  opacity in the right lower lobe, infectious or inflammatory.      Mild diffuse bronchial wall thickening suggestive of bronchiolitis.      Mild right axillary lymphadenopathy with nodes normal in size but increased in number, likely reactive.      Mild coronary artery calcification indicating atherosclerotic heart disease.      Stable 1.7 cm right thyroid nodule. Recommend further evaluation with ultrasound.      Stable 4.1 cm aortic root.      This study has been marked for immediate notification and follow up.            Workstation performed: [id number]                    Procedures  ECG 12 Lead Documentation Only    Date/Time: 7/19/2024 12:26 PM    Performed by: Skylar Becerril PA-C  Authorized by: Skylar Becerril PA-C    ECG reviewed by me, the ED Provider: yes    Patient location:  ED  Interpretation:     Interpretation: abnormal    Rate:     ECG rate:  79  Rhythm:     Rhythm: sinus rhythm    Ectopy:     Ectopy: none    QRS:     QRS axis:  Left  ST segments:     ST segments:  Normal  T waves:     T waves: normal             ED Course  ED Course as of 07/19/24 1656   Fri Jul 19, 2024   1305 WBC(!): 14.03   1305 Pulse: 103   1320 Phosphorus(!): 2.5                                 SBIRT 20yo+      Flowsheet Row Most Recent Value   Initial Alcohol Screen: US AUDIT-C     1. How often do you have a drink containing alcohol? 0 Filed at: 07/19/2024 1153   2. How many drinks containing alcohol do you have on a typical day you are drinking?  0 Filed at: 07/19/2024 1158   3a. Male UNDER 65: How often do you have five or more drinks on one occasion? 0 Filed at: 07/19/2024 1159   3b. FEMALE Any Age, or MALE 65+: How often do you have 4 or more drinks on one occassion? 0 Filed at: 07/19/2024 1158   Audit-C Score 0 Filed at: 07/19/2024 1158   RAMIN: How many times in the past year have you...    Used an illegal drug or used a prescription medication for non-medical reasons? Never Filed at: 07/19/2024 1159                       Medical Decision Making  Problems Addressed:  Bronchiolitis: acute illness or injury  Pneumonia of right lower lobe due to infectious organism: acute illness or injury    Amount and/or Complexity of Data Reviewed  Labs: ordered. Decision-making details documented in ED Course.  Radiology: ordered.    Risk  Prescription drug management.                 Disposition  Final diagnoses:   Pneumonia of right lower lobe due to infectious organism   Bronchiolitis     Time reflects when diagnosis was documented in both MDM as applicable and the Disposition within this note       Time User Action Codes Description Comment    7/19/2024  2:18 PM Skylar Becerril Add [J18.9] Pneumonia of right lower lobe due to infectious organism     7/19/2024  2:19 PM Skylar Becerril Add [J21.9] Bronchiolitis           ED Disposition       ED Disposition   Discharge    Condition   Stable    Date/Time   Fri Jul 19, 2024 1420    Comment   Haroldo Abreu discharge to home/self care.                   Follow-up Information       Follow up With Specialties Details Why Contact Info Additional Information    Sherlyn Victoria, DO Family Medicine Go to  As needed [address] [place] [id number] [phone]        [facility] Emergency Medicine Go to  If symptoms worsen [address] [facility] [address] [phone] [facility], [address] [place], [place] [address]            Discharge Medication List as of 7/19/2024  2:20 PM        START taking these medications    Details   doxycycline hyclate (VIBRAMYCIN) 100 mg capsule Take 1 capsule (100 mg total) by mouth 2 (two) times a day for 10 days, Starting Fri 7/19/2024, Until Mon 7/29/2024, Normal           CONTINUE these medications which have NOT CHANGED    Details   ketoconazole (NIZORAL) 2 % cream Apply 2x/day to bottom of feet and in between  toes for 3 weeks. Apply on facial redness/rash as needed., Normal      lisinopril (ZESTRIL) 20 mg tablet Take 1 tablet (20 mg total) by mouth daily, Starting Tue 5/28/2024, Normal      Multiple Vitamins-Minerals (MULTIVITAMIN ADULTS PO) Take 1 capsule by mouth daily, Historical Med      RABEprazole (ACIPHEX) 20 MG tablet Take 1 tablet (20 mg total) by mouth daily, Starting Tue 6/4/2024, Normal      tadalafil (CIALIS) 5 MG tablet Take 1 tablet (5 mg total) by mouth in the morning, Starting Thu 5/9/2024, Normal      triamcinolone (KENALOG) 0.025 % cream Apply topically 2 (two) times a day Apply sparingly 2-4 times a day for up to one week., Starting Mon 4/4/2022, Normal             No discharge procedures on file.    PDMP Review         Value Time User    PDMP Reviewed  Yes 1/23/2023 12:47 PM Sherlyn Victoria DO            ED Provider  Electronically Signed by             Skylar Becerril PA-C  07/19/24 [id number]

## 2024-07-21 LAB — BACTERIA BLD CULT: NORMAL

## 2024-07-22 PROBLEM — E04.1 THYROID NODULE: Status: ACTIVE | Noted: 2024-07-22

## 2024-07-22 LAB
A PHAGOCYTOPH IGG TITR SER IF: NEGATIVE {TITER}
A PHAGOCYTOPH IGM TITR SER IF: NEGATIVE {TITER}
E CHAFFEENSIS IGG TITR SER IF: NEGATIVE {TITER}
E CHAFFEENSIS IGM TITR SER IF: NEGATIVE {TITER}
RESULT/COMMENT: NORMAL

## 2024-07-23 LAB
B MICROTI IGG TITR SER: NORMAL {TITER}
B MICROTI IGM TITR SER: NORMAL {TITER}
RESULT/COMMENT: NORMAL

## 2024-07-24 LAB — BACTERIA BLD CULT: NORMAL

## 2024-07-25 DIAGNOSIS — R79.82 ELEVATED C-REACTIVE PROTEIN (CRP): ICD-10-CM

## 2024-07-25 DIAGNOSIS — M25.50 ARTHRALGIA, UNSPECIFIED JOINT: Primary | ICD-10-CM

## 2024-07-25 DIAGNOSIS — E04.1 THYROID NODULE: ICD-10-CM

## 2024-07-25 RX ORDER — MELOXICAM 15 MG/1
15 TABLET ORAL DAILY PRN
Qty: 30 TABLET | Refills: 3 | Status: SHIPPED | OUTPATIENT
Start: 2024-07-25

## 2024-07-28 LAB
ATRIAL RATE: 79 BPM
P AXIS: 25 DEGREES
PR INTERVAL: 124 MS
QRS AXIS: -53 DEGREES
QRSD INTERVAL: 90 MS
QT INTERVAL: 364 MS
QTC INTERVAL: 417 MS
T WAVE AXIS: 22 DEGREES
VENTRICULAR RATE: 79 BPM

## 2024-07-28 PROCEDURE — 93010 ELECTROCARDIOGRAM REPORT: CPT | Performed by: INTERNAL MEDICINE

## 2024-08-05 ENCOUNTER — OFFICE VISIT (OUTPATIENT)
Dept: GASTROENTEROLOGY | Facility: CLINIC | Age: 59
End: 2024-08-05
Payer: COMMERCIAL

## 2024-08-05 VITALS
HEIGHT: 69 IN | DIASTOLIC BLOOD PRESSURE: 74 MMHG | WEIGHT: 268 LBS | BODY MASS INDEX: 39.69 KG/M2 | SYSTOLIC BLOOD PRESSURE: 124 MMHG

## 2024-08-05 DIAGNOSIS — R13.10 DYSPHAGIA, UNSPECIFIED TYPE: ICD-10-CM

## 2024-08-05 DIAGNOSIS — R14.2 BELCHING SYMPTOM: ICD-10-CM

## 2024-08-05 DIAGNOSIS — Z86.010 HISTORY OF COLON POLYPS: ICD-10-CM

## 2024-08-05 DIAGNOSIS — K21.9 GASTROESOPHAGEAL REFLUX DISEASE, UNSPECIFIED WHETHER ESOPHAGITIS PRESENT: Primary | ICD-10-CM

## 2024-08-05 PROCEDURE — 99214 OFFICE O/P EST MOD 30 MIN: CPT | Performed by: INTERNAL MEDICINE

## 2024-08-05 RX ORDER — RABEPRAZOLE SODIUM 20 MG/1
20 TABLET, DELAYED RELEASE ORAL DAILY
Qty: 90 TABLET | Refills: 3 | Status: SHIPPED | OUTPATIENT
Start: 2024-08-05

## 2024-08-05 NOTE — LETTER
August 5, 2024     Sherlyn Victoria DO  [address] [place] [id number]    Patient: Haroldo Abreu   YOB: 1965   Date of Visit: 8/5/2024       Dear Dr. Victoria:    Thank you for referring Haroldo Abreu to me for evaluation. Below are my notes for this consultation.    If you have questions, please do not hesitate to call me. I look forward to following your patient along with you.         Sincerely,        Negra Frederick MD        CC: No Recipients    Negra Frederick MD  8/5/2024  6:34 PM  Sign when Signing Visit  [facility] - Outpatient Follow-up Note  Haroldo Abreu 58 y.o. male MRN: [id number]  Encounter: [id number]    ASSESSMENT AND PLAN:      1. Gastroesophageal reflux disease, unspecified whether esophagitis present  - RABEprazole (ACIPHEX) 20 MG tablet; Take 1 tablet (20 mg total) by mouth daily  Dispense: 90 tablet; Refill: 3  2. Belching symptom  3. Dysphagia, unspecified type  58-year-old male with history of GERD and dysphagia now with worsening symptoms including excessive belching.  Appears to be a result of recent acute illness that included rash myalgia muscle weakness pharyngitis and multiple joint pains (particularly fingers and knees).  Tested negative for Lyme.  Treated with steroids and NSAIDs which did not clearly help.  Appears to have had some improvement after treatment with doxycycline presumably to cover Lyme and possible underlying pneumonia.  Denies any alarm symptoms of dysphagia weight loss or bleeding.  Compliant with his PPI therapy.  Suspect exacerbation of upper GI symptoms are secondary to acute infection.  Also likely exacerbated by medical therapy with doxycycline and NSAIDs and steroids.  Continue reflux diet and precautions  Continue PPI  If dysphagia returns or upper GI symptoms worsen we will plan repeat EGD  If arthralgias and muscle weakness persist, will consider ID and rheumatologic consultations    4. History of  colon polyps  Up-to-date with polyp surveillance.  5-year follow-up recommended.  Next colonoscopy due January 2026      Followup Appointment: Call if symptoms worsen, otherwise routine follow-up in 1 year  ______________________________________________________________________    Chief Complaint   Patient presents with   • Follow-up     Pt states he was taking antibiotics and Meloxicam, heartburn symptoms flared. Antibiotics completed and discontinued Meloxicam.     HPI:  Was feeling tired, fatigue, joint pain, rash, sore throat about six weeks ago.  Palpitations and SOB.  Tested negative for Lyme.  Got course of steroids thinking it was autoimmune, no help.  Seen in ER  Thought to be Lyme given history  (was out golfing a few weeks before.  [place] told he had pneumonia.  Received doxycycline.  Then also with muscle weakness, got meloxicam.      Also bad belching on doxy.  Still with pain and ?swelling in hands and knees.  Hard to .  Just finished doxy a week ago.  No clear relief with doxy or meloxicam.  SOB and palpitations better after doxy.  Still with joint pain and trouble getting up due to knee pain, then better after starts to move.      Historical Information  Past Medical History:   Diagnosis Date   • Cancer (HCC) 2018   • Colon polyp    • COVID-19 12/2020   • GERD (gastroesophageal reflux disease)    • HL (hearing loss) 1974    Tumor   • Melanoma ([facility]) 2017    Dermatology and Moh's surgery in [place]. [facility]; Right lateral upper back   • Pneumonia 2/21/2023    I have had a cough since early Deecember   • Testicular cancer ([facility]) 2000    Dr monterroso and yony (urology)     Past Surgical History:   Procedure Laterality Date   • COLONOSCOPY      January 2021 small cecal serrated adenoma, sigmoid diverticulosis, internal hemorrhoids   • ORCHIECTOMY Right    • SKIN CANCER EXCISION     • TUMOR EXCISION      right ear as a child   • UPPER GASTROINTESTINAL ENDOSCOPY      January 2021: Schatzki ring  "biopsies negative for Johnson's or EOE, fundic gland polyps, mild gastritis negative for H pylori.   September 2014 Schatzki ring biopsies negative for Johnson's EOE or H pylori.     Social History     Substance and Sexual Activity   Alcohol Use Yes   • Alcohol/week: 1.0 standard drink of alcohol   • Types: 1 Cans of beer per week    Comment: socially     Social History     Substance and Sexual Activity   Drug Use Never     Social History     Tobacco Use   Smoking Status Never   • Passive exposure: Never   Smokeless Tobacco Never     Family History   Problem Relation Age of Onset   • Diabetes Mother    • Hypertension Mother    • Hypertension Father    • Heart disease Father    • Heart failure Father         Congestive   • No Known Problems Sister    • No Known Problems Brother    • No Known Problems Brother    • Colon polyps Neg Hx    • Colon cancer Neg Hx          Current Outpatient Medications:   •  ketoconazole (NIZORAL) 2 % cream  •  lisinopril (ZESTRIL) 20 mg tablet  •  Multiple Vitamins-Minerals (MULTIVITAMIN ADULTS PO)  •  RABEprazole (ACIPHEX) 20 MG tablet  •  tadalafil (CIALIS) 5 MG tablet  •  triamcinolone (KENALOG) 0.025 % cream  •  meloxicam (MOBIC) 15 mg tablet  No Known Allergies  Reviewed medications and allergies and updated as indicated    PHYSICAL EXAM:    Blood pressure 124/74, height 5' 8.5\" (1.74 m), weight 122 kg (268 lb). Body mass index is 40.16 kg/m².  General Appearance: NAD, cooperative, alert  Eyes: Anicteric, conjunctiva pink  ENT:  Normocephalic, atraumatic, normal mucosa.    Neck:  Supple, symmetrical, trachea midline  Resp:  Clear to auscultation bilaterally; no rales, rhonchi or wheezing; respirations unlabored   CV:  S1 S2, Regular rate and rhythm; no murmur, rub, or gallop.  GI:  Soft, non-tender, non-distended; normal bowel sounds; no masses, no organomegaly   Rectal: Deferred  Musculoskeletal: No cyanosis, clubbing or edema. Normal ROM.  Skin:  No jaundice, rashes, or lesions "   Heme/Lymph: No palpable cervical lymphadenopathy  Psych: Normal affect, good eye contact  Neuro: No gross deficits, AAOx3    Lab Results:   Lab Results   Component Value Date    WBC 14.03 (H) 07/19/2024    HGB 11.5 (L) 07/19/2024    HCT 35.3 (L) 07/19/2024    MCV 86 07/19/2024     07/19/2024     Lab Results   Component Value Date    K 4.4 07/19/2024     07/19/2024    CO2 30 07/19/2024    BUN 15 07/19/2024    CREATININE 0.82 07/19/2024    GLUF 79 12/01/2023    CALCIUM 8.9 07/19/2024    CORRECTEDCA 9.5 07/19/2024    AST 18 07/19/2024    ALT 20 07/19/2024    ALKPHOS 68 07/19/2024    EGFR 97 07/19/2024       Radiology Results:   CT chest without contrast    Result Date: 7/19/2024  Narrative: CT CHEST WITHOUT IV CONTRAST INDICATION: palpitations - sob. GEN WEAKNESS, SOB, BODY ACHES.   History of testicular cancer and melanoma. 4.1 cm aortic root on echo from March 2023. COMPARISON: CXR 4/10/2023 and chest CT 3/8/2023. TECHNIQUE: CT examination of the chest was performed without intravenous contrast. Multiplanar 2D reformatted images were created from the source data. This examination, like all CT scans performed in the [facility], was performed utilizing techniques to minimize radiation dose exposure, including the use of iterative reconstruction and automated exposure control. Radiation dose length product (DLP) for this visit: 745.73 mGy-cm FINDINGS: LUNGS: Mild ground glass opacity in the paraspinal right lower lobe. AIRWAYS: Mild diffuse bronchial wall thickening. PLEURA: Unremarkable. HEART/GREAT VESSELS: Normal heart size. Mild coronary artery calcification indicating atherosclerotic heart disease. Stable size of aortic root at 4.1 cm. MEDIASTINUM AND CHRISTA: Unremarkable. CHEST WALL AND LOWER NECK: Mild right axillary lymphadenopathy with nodes normal in size but slightly increased in number.  1.7 cm right thyroid nodule, stable since March 2023. UPPER ABDOMEN: Unremarkable. OSSEOUS  STRUCTURES: Moderate degenerative disease in the spine.     Impression: Mild paraspinal ground glass opacity in the right lower lobe, infectious or inflammatory. Mild diffuse bronchial wall thickening suggestive of bronchiolitis. Mild right axillary lymphadenopathy with nodes normal in size but increased in number, likely reactive. Mild coronary artery calcification indicating atherosclerotic heart disease. Stable 1.7 cm right thyroid nodule. Recommend further evaluation with ultrasound. Stable 4.1 cm aortic root. This study has been marked for immediate notification and follow up. Workstation performed: [id number]       Answers submitted by the patient for this visit:  Abdominal Pain Questionnaire (Submitted on 7/30/2024)  Chief Complaint: Abdominal pain  Pain - numeric: 0/10  anorexia: No  arthralgias: Yes  belching: Yes  constipation: No  diarrhea: No  dysuria: No  fever: No  flatus: No  frequency: No  headaches: No  hematochezia: No  hematuria: No  melena: No  myalgias: Yes  nausea: No  weight loss: No  vomiting: No  Aggravated by: nothing  Relieved by: nothing  Diagnostic workup: CT scan

## 2024-08-05 NOTE — PROGRESS NOTES
[facility] - Outpatient Follow-up Note  Haroldo Abreu 58 y.o. male MRN: [id number]  Encounter: [id number]    ASSESSMENT AND PLAN:      1. Gastroesophageal reflux disease, unspecified whether esophagitis present  - RABEprazole (ACIPHEX) 20 MG tablet; Take 1 tablet (20 mg total) by mouth daily  Dispense: 90 tablet; Refill: 3  2. Belching symptom  3. Dysphagia, unspecified type  58-year-old male with history of GERD and dysphagia now with worsening symptoms including excessive belching.  Appears to be a result of recent acute illness that included rash myalgia muscle weakness pharyngitis and multiple joint pains (particularly fingers and knees).  Tested negative for Lyme.  Treated with steroids and NSAIDs which did not clearly help.  Appears to have had some improvement after treatment with doxycycline presumably to cover Lyme and possible underlying pneumonia.  Denies any alarm symptoms of dysphagia weight loss or bleeding.  Compliant with his PPI therapy.  Suspect exacerbation of upper GI symptoms are secondary to acute infection.  Also likely exacerbated by medical therapy with doxycycline and NSAIDs and steroids.  Continue reflux diet and precautions  Continue PPI  If dysphagia returns or upper GI symptoms worsen we will plan repeat EGD  If arthralgias and muscle weakness persist, will consider ID and rheumatologic consultations    4. History of colon polyps  Up-to-date with polyp surveillance.  5-year follow-up recommended.  Next colonoscopy due January 2026      Followup Appointment: Call if symptoms worsen, otherwise routine follow-up in 1 year  ______________________________________________________________________    Chief Complaint   Patient presents with    Follow-up     Pt states he was taking antibiotics and Meloxicam, heartburn symptoms flared. Antibiotics completed and discontinued Meloxicam.     HPI:  Was feeling tired, fatigue, joint pain, rash, sore throat about  six weeks ago.  Palpitations and SOB.  Tested negative for Lyme.  Got course of steroids thinking it was autoimmune, no help.  Seen in ER  Thought to be Lyme given history  (was out golfing a few weeks before.  [place] told he had pneumonia.  Received doxycycline.  Then also with muscle weakness, got meloxicam.      Also bad belching on doxy.  Still with pain and ?swelling in hands and knees.  Hard to .  Just finished doxy a week ago.  No clear relief with doxy or meloxicam.  SOB and palpitations better after doxy.  Still with joint pain and trouble getting up due to knee pain, then better after starts to move.      Historical Information   Past Medical History:   Diagnosis Date    Cancer (HCC) 2018    Colon polyp     COVID-19 12/2020    GERD (gastroesophageal reflux disease)     HL (hearing loss) 1974    Tumor    Melanoma (HCC) 2017    Dermatology and Moh's surgery in [place]. [place]; Right lateral upper back    Pneumonia 2/21/2023    I have had a cough since early Deecember    Testicular cancer (HCC) 2000    Dr monterroso and yony (urology)     Past Surgical History:   Procedure Laterality Date    COLONOSCOPY      January 2021 small cecal serrated adenoma, sigmoid diverticulosis, internal hemorrhoids    ORCHIECTOMY Right     SKIN CANCER EXCISION      TUMOR EXCISION      right ear as a child    UPPER GASTROINTESTINAL ENDOSCOPY      January 2021: Schatzki ring biopsies negative for Johnson's or EOE, fundic gland polyps, mild gastritis negative for H pylori.   September 2014 Schatzki ring biopsies negative for Johnson's EOE or H pylori.     Social History     Substance and Sexual Activity   Alcohol Use Yes    Alcohol/week: 1.0 standard drink of alcohol    Types: 1 Cans of beer per week    Comment: socially     Social History     Substance and Sexual Activity   Drug Use Never     Social History     Tobacco Use   Smoking Status Never    Passive exposure: Never   Smokeless Tobacco Never     Family History  "  Problem Relation Age of Onset    Diabetes Mother     Hypertension Mother     Hypertension Father     Heart disease Father     Heart failure Father         Congestive    No Known Problems Sister     No Known Problems Brother     No Known Problems Brother     Colon polyps Neg Hx     Colon cancer Neg Hx          Current Outpatient Medications:     ketoconazole (NIZORAL) 2 % cream    lisinopril (ZESTRIL) 20 mg tablet    Multiple Vitamins-Minerals (MULTIVITAMIN ADULTS PO)    RABEprazole (ACIPHEX) 20 MG tablet    tadalafil (CIALIS) 5 MG tablet    triamcinolone (KENALOG) 0.025 % cream    meloxicam (MOBIC) 15 mg tablet  No Known Allergies  Reviewed medications and allergies and updated as indicated    PHYSICAL EXAM:    Blood pressure 124/74, height 5' 8.5\" (1.74 m), weight 122 kg (268 lb). Body mass index is 40.16 kg/m².  General Appearance: NAD, cooperative, alert  Eyes: Anicteric, conjunctiva pink  ENT:  Normocephalic, atraumatic, normal mucosa.    Neck:  Supple, symmetrical, trachea midline  Resp:  Clear to auscultation bilaterally; no rales, rhonchi or wheezing; respirations unlabored   CV:  S1 S2, Regular rate and rhythm; no murmur, rub, or gallop.  GI:  Soft, non-tender, non-distended; normal bowel sounds; no masses, no organomegaly   Rectal: Deferred  Musculoskeletal: No cyanosis, clubbing or edema. Normal ROM.  Skin:  No jaundice, rashes, or lesions   Heme/Lymph: No palpable cervical lymphadenopathy  Psych: Normal affect, good eye contact  Neuro: No gross deficits, AAOx3    Lab Results:   Lab Results   Component Value Date    WBC 14.03 (H) 07/19/2024    HGB 11.5 (L) 07/19/2024    HCT 35.3 (L) 07/19/2024    MCV 86 07/19/2024     07/19/2024     Lab Results   Component Value Date    K 4.4 07/19/2024     07/19/2024    CO2 30 07/19/2024    BUN 15 07/19/2024    CREATININE 0.82 07/19/2024    GLUF 79 12/01/2023    CALCIUM 8.9 07/19/2024    CORRECTEDCA 9.5 07/19/2024    AST 18 07/19/2024    ALT 20 07/19/2024 "    ALKPHOS 68 07/19/2024    EGFR 97 07/19/2024       Radiology Results:   CT chest without contrast    Result Date: 7/19/2024  Narrative: CT CHEST WITHOUT IV CONTRAST INDICATION: palpitations - sob. GEN WEAKNESS, SOB, BODY ACHES.   History of testicular cancer and melanoma. 4.1 cm aortic root on echo from March 2023. COMPARISON: CXR 4/10/2023 and chest CT 3/8/2023. TECHNIQUE: CT examination of the chest was performed without intravenous contrast. Multiplanar 2D reformatted images were created from the source data. This examination, like all CT scans performed in the [facility], was performed utilizing techniques to minimize radiation dose exposure, including the use of iterative reconstruction and automated exposure control. Radiation dose length product (DLP) for this visit: 745.73 mGy-cm FINDINGS: LUNGS: Mild ground glass opacity in the paraspinal right lower lobe. AIRWAYS: Mild diffuse bronchial wall thickening. PLEURA: Unremarkable. HEART/GREAT VESSELS: Normal heart size. Mild coronary artery calcification indicating atherosclerotic heart disease. Stable size of aortic root at 4.1 cm. MEDIASTINUM AND CHRISTA: Unremarkable. CHEST WALL AND LOWER NECK: Mild right axillary lymphadenopathy with nodes normal in size but slightly increased in number.  1.7 cm right thyroid nodule, stable since March 2023. UPPER ABDOMEN: Unremarkable. OSSEOUS STRUCTURES: Moderate degenerative disease in the spine.     Impression: Mild paraspinal ground glass opacity in the right lower lobe, infectious or inflammatory. Mild diffuse bronchial wall thickening suggestive of bronchiolitis. Mild right axillary lymphadenopathy with nodes normal in size but increased in number, likely reactive. Mild coronary artery calcification indicating atherosclerotic heart disease. Stable 1.7 cm right thyroid nodule. Recommend further evaluation with ultrasound. Stable 4.1 cm aortic root. This study has been marked for immediate notification  and follow up. Workstation performed: [id number]       Answers submitted by the patient for this visit:  Abdominal Pain Questionnaire (Submitted on 7/30/2024)  Chief Complaint: Abdominal pain  Pain - numeric: 0/10  anorexia: No  arthralgias: Yes  belching: Yes  constipation: No  diarrhea: No  dysuria: No  fever: No  flatus: No  frequency: No  headaches: No  hematochezia: No  hematuria: No  melena: No  myalgias: Yes  nausea: No  weight loss: No  vomiting: No  Aggravated by: nothing  Relieved by: nothing  Diagnostic workup: CT scan

## 2024-08-06 DIAGNOSIS — R79.82 ELEVATED C-REACTIVE PROTEIN (CRP): Primary | ICD-10-CM

## 2024-08-06 DIAGNOSIS — M25.50 ARTHRALGIA, UNSPECIFIED JOINT: ICD-10-CM

## 2024-08-14 ENCOUNTER — APPOINTMENT (OUTPATIENT)
Dept: LAB | Facility: HOSPITAL | Age: 59
End: 2024-08-14
Payer: COMMERCIAL

## 2024-08-14 DIAGNOSIS — M25.50 PAIN IN JOINT, MULTIPLE SITES: ICD-10-CM

## 2024-08-14 LAB
ABO GROUP BLD: NORMAL
BASOPHILS # BLD AUTO: 0.05 THOUSANDS/ÂΜL (ref 0–0.1)
BASOPHILS NFR BLD AUTO: 0 % (ref 0–1)
BLD GP AB SCN SERPL QL: NEGATIVE
CRP SERPL QL: 101.1 MG/L
EOSINOPHIL # BLD AUTO: 0.18 THOUSAND/ÂΜL (ref 0–0.61)
EOSINOPHIL NFR BLD AUTO: 2 % (ref 0–6)
ERYTHROCYTE [DISTWIDTH] IN BLOOD BY AUTOMATED COUNT: 13.3 % (ref 11.6–15.1)
ERYTHROCYTE [SEDIMENTATION RATE] IN BLOOD: 77 MM/HOUR (ref 0–19)
HCT VFR BLD AUTO: 33.2 % (ref 36.5–49.3)
HGB BLD-MCNC: 10.7 G/DL (ref 12–17)
IGA SERPL-MCNC: 324 MG/DL (ref 66–433)
IGG SERPL-MCNC: 1468 MG/DL (ref 635–1741)
IGM SERPL-MCNC: 35 MG/DL (ref 45–281)
IMM GRANULOCYTES # BLD AUTO: 0.08 THOUSAND/UL (ref 0–0.2)
IMM GRANULOCYTES NFR BLD AUTO: 1 % (ref 0–2)
LYMPHOCYTES # BLD AUTO: 1.74 THOUSANDS/ÂΜL (ref 0.6–4.47)
LYMPHOCYTES NFR BLD AUTO: 15 % (ref 14–44)
MAGNESIUM SERPL-MCNC: 2.1 MG/DL (ref 1.9–2.7)
MCH RBC QN AUTO: 28 PG (ref 26.8–34.3)
MCHC RBC AUTO-ENTMCNC: 32.2 G/DL (ref 31.4–37.4)
MCV RBC AUTO: 87 FL (ref 82–98)
MONOCYTES # BLD AUTO: 0.87 THOUSAND/ÂΜL (ref 0.17–1.22)
MONOCYTES NFR BLD AUTO: 8 % (ref 4–12)
NEUTROPHILS # BLD AUTO: 8.4 THOUSANDS/ÂΜL (ref 1.85–7.62)
NEUTS SEG NFR BLD AUTO: 74 % (ref 43–75)
NRBC BLD AUTO-RTO: 0 /100 WBCS
PLATELET # BLD AUTO: 359 THOUSANDS/UL (ref 149–390)
PMV BLD AUTO: 10.9 FL (ref 8.9–12.7)
RBC # BLD AUTO: 3.82 MILLION/UL (ref 3.88–5.62)
RH BLD: POSITIVE
SPECIMEN EXPIRATION DATE: NORMAL
WBC # BLD AUTO: 11.32 THOUSAND/UL (ref 4.31–10.16)

## 2024-08-14 PROCEDURE — 83735 ASSAY OF MAGNESIUM: CPT

## 2024-08-14 PROCEDURE — 36415 COLL VENOUS BLD VENIPUNCTURE: CPT

## 2024-08-14 PROCEDURE — 85652 RBC SED RATE AUTOMATED: CPT

## 2024-08-14 PROCEDURE — 86430 RHEUMATOID FACTOR TEST QUAL: CPT

## 2024-08-14 PROCEDURE — 86618 LYME DISEASE ANTIBODY: CPT

## 2024-08-14 PROCEDURE — 82784 ASSAY IGA/IGD/IGG/IGM EACH: CPT

## 2024-08-14 PROCEDURE — 86140 C-REACTIVE PROTEIN: CPT

## 2024-08-14 PROCEDURE — 86900 BLOOD TYPING SEROLOGIC ABO: CPT

## 2024-08-14 PROCEDURE — 86200 CCP ANTIBODY: CPT

## 2024-08-14 PROCEDURE — 86850 RBC ANTIBODY SCREEN: CPT

## 2024-08-14 PROCEDURE — 86038 ANTINUCLEAR ANTIBODIES: CPT

## 2024-08-14 PROCEDURE — 86901 BLOOD TYPING SEROLOGIC RH(D): CPT

## 2024-08-14 PROCEDURE — 85025 COMPLETE CBC W/AUTO DIFF WBC: CPT

## 2024-08-15 ENCOUNTER — OFFICE VISIT (OUTPATIENT)
Dept: UROLOGY | Facility: CLINIC | Age: 59
End: 2024-08-15
Payer: COMMERCIAL

## 2024-08-15 VITALS
WEIGHT: 264 LBS | DIASTOLIC BLOOD PRESSURE: 70 MMHG | SYSTOLIC BLOOD PRESSURE: 128 MMHG | HEART RATE: 109 BPM | HEIGHT: 69 IN | BODY MASS INDEX: 39.1 KG/M2 | OXYGEN SATURATION: 98 %

## 2024-08-15 DIAGNOSIS — Z12.5 SCREENING FOR PROSTATE CANCER: ICD-10-CM

## 2024-08-15 DIAGNOSIS — N39.43 POST-VOID DRIBBLING: Primary | ICD-10-CM

## 2024-08-15 LAB
ANA SER QL IA: NEGATIVE
B BURGDOR IGG+IGM SER QL IA: NEGATIVE
RHEUMATOID FACT SER QL LA: NEGATIVE

## 2024-08-15 PROCEDURE — 99213 OFFICE O/P EST LOW 20 MIN: CPT | Performed by: PHYSICIAN ASSISTANT

## 2024-08-15 RX ORDER — PREDNISONE 20 MG/1
TABLET ORAL
COMMUNITY
Start: 2024-08-13

## 2024-08-15 NOTE — PROGRESS NOTES
8/15/2024      Chief Complaint   Patient presents with   • Follow-up   • Post-void dribbling         Assessment and Plan    58 y.o. male managed by AP team    1. Post-void dribbling  Assessment & Plan:  Continue tadalafil 5mg daily  2. Screening for prostate cancer  -     PSA, Total Screen; Future; Expected date: 08/15/2025    f/u 1 yr psa/yeimy    History of Present Illness  Haroldo Abreu is a 58 y.o. male here for evaluation of 3-month med check after initiation of daily Cialis 5 mg for mild urinary frequency and postvoid dribbling.  He has seen some improvement still has durable few times a week.  Nocturia once if at all.  He has been sick for the last 6 weeks with some other infectious/inflammatory syndrome has been on and off steroids and antibiotics, he questions possible Lyme disease.        Review of Systems   Constitutional: Negative.    Respiratory: Negative.     Cardiovascular: Negative.    Genitourinary:  Negative for decreased urine volume, difficulty urinating, dysuria, flank pain, frequency, hematuria and urgency.   Musculoskeletal: Negative.            AUA SYMPTOM SCORE      Flowsheet Row Most Recent Value   AUA SYMPTOM SCORE    How often have you had a sensation of not emptying your bladder completely after you finished urinating? 1 (P)     How often have you had to urinate again less than two hours after you finished urinating? 1 (P)     How often have you found you stopped and started again several times when you urinate? 1 (P)     How often have you found it difficult to postpone urination? 0 (P)     How often have you had a weak urinary stream? 0 (P)     How often have you had to push or strain to begin urination? 0 (P)     How many times did you most typically get up to urinate from the time you went to bed at night until the time you got up in the morning? 2 (P)     Quality of Life: If you were to spend the rest of your life with your urinary condition just the way it is now, how would you  "feel about that? 2 (P)     AUA SYMPTOM SCORE 5 (P)               Vitals  Vitals:    08/15/24 1005   BP: 128/70   BP Location: Right arm   Patient Position: Sitting   Cuff Size: Adult   Pulse: (!) 109   SpO2: 98%   Weight: 120 kg (264 lb)   Height: 5' 8.5\" (1.74 m)       Physical Exam  Vitals and nursing note reviewed.   Constitutional:       General: He is not in acute distress.     Appearance: He is well-developed. He is not diaphoretic.   HENT:      Head: Normocephalic and atraumatic.   Pulmonary:      Effort: Pulmonary effort is normal.   Musculoskeletal:         General: No edema.   Skin:     General: Skin is warm and dry.   Neurological:      Mental Status: He is alert and oriented to person, place, and time.      Gait: Gait normal.   Psychiatric:         Mood and Affect: Mood and affect normal.         Speech: Speech normal.         Behavior: Behavior normal.           Past History  Past Medical History:   Diagnosis Date   • Cancer (HCC) 2018   • Colon polyp    • COVID-19 12/2020   • GERD (gastroesophageal reflux disease)    • HL (hearing loss) 1974    Tumor   • Melanoma (HCC) 2017    Dermatology and Moh's surgery in [place]. [place]; Right lateral upper back   • Pneumonia 2/21/2023    I have had a cough since early Deecember   • Testicular cancer (HCC) 2000    Dr monterroso and yony (urology)     Social History     Socioeconomic History   • Marital status: /Civil Union     Spouse name: None   • Number of children: None   • Years of education: None   • Highest education level: None   Occupational History   • None   Tobacco Use   • Smoking status: Never     Passive exposure: Never   • Smokeless tobacco: Never   Vaping Use   • Vaping status: Never Used   Substance and Sexual Activity   • Alcohol use: Yes     Alcohol/week: 1.0 standard drink of alcohol     Types: 1 Cans of beer per week     Comment: socially   • Drug use: Never   • Sexual activity: Yes     Partners: Female     Birth " "control/protection: None   Other Topics Concern   • None   Social History Narrative    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 [place]        2 children    Likes to golf     Social Determinants of Health     Financial Resource Strain: Not on file   Food Insecurity: Not on file   Transportation Needs: Not on file   Physical Activity: Not on file   Stress: Not on file   Social Connections: Not on file   Intimate Partner Violence: Not on file   Housing Stability: Not on file     Social History     Tobacco Use   Smoking Status Never   • Passive exposure: Never   Smokeless Tobacco Never     Family History   Problem Relation Age of Onset   • Diabetes Mother    • Hypertension Mother    • Hypertension Father    • Heart disease Father    • Heart failure Father         Congestive   • No Known Problems Sister    • No Known Problems Brother    • No Known Problems Brother    • Colon polyps Neg Hx    • Colon cancer Neg Hx        The following portions of the patient's history were reviewed and updated as appropriate: allergies, current medications, past medical history, past social history, past surgical history and problem list.    Results  No results found for this or any previous visit (from the past 1 hour(s)).]  Lab Results   Component Value Date    PSA 0.40 05/24/2024    PSA 0.4 12/17/2021     Lab Results   Component Value Date    CALCIUM 8.9 07/19/2024    K 4.4 07/19/2024    CO2 30 07/19/2024     07/19/2024    BUN 15 07/19/2024    CREATININE 0.82 07/19/2024     Lab Results   Component Value Date    WBC 11.32 (H) 08/14/2024    HGB 10.7 (L) 08/14/2024    HCT 33.2 (L) 08/14/2024    MCV 87 08/14/2024     08/14/2024       Portions of the above record have been created with voice recognition software via dictation. Occasional wrong word or \"sound alike\" substitution may have occurred due to the inherent limitations of voice recognition software. Read the chart carefully and " recognize, using context, where substitution may have occurred.

## 2024-08-16 LAB — CCP AB SER IA-ACNC: 1.1

## 2024-08-21 ENCOUNTER — APPOINTMENT (OUTPATIENT)
Dept: URGENT CARE | Facility: CLINIC | Age: 59
End: 2024-08-21

## 2024-09-03 ENCOUNTER — TELEPHONE (OUTPATIENT)
Dept: GASTROENTEROLOGY | Facility: CLINIC | Age: 59
End: 2024-09-03

## 2024-09-03 DIAGNOSIS — D64.9 ANEMIA, UNSPECIFIED TYPE: Primary | ICD-10-CM

## 2024-09-03 NOTE — TELEPHONE ENCOUNTER
Discussion with patient's wife Kim.  PCP is referring patient back to GI due to worsening anemia.  GI symptoms actually improving with less upper GI symptoms.  Still with some bright red blood per rectum suspicious for hemorrhoidal bleeding but no other complaints.  Reviewed labs hemoglobin has drifted from 11 range to 10 range.  Interestingly C-reactive protein remains elevated and sed rate is also elevated.  Has appointment with but has not seen rheumatology yet given concerns about an underlying rheumatologic disorder with the patient's constellation of symptoms and labs.  Advise continuing with present meds, continue Aciphex.  Limit NSAIDs is much as possible.  Rheumatology consultation as planned.  Repeat CBC in about 2 weeks.  If hemoglobin continues to drift lower we will plan EGD and colonoscopy to assess for possible blood loss anemia.

## 2024-09-14 PROBLEM — Z12.5 SCREENING FOR PROSTATE CANCER: Status: RESOLVED | Noted: 2024-08-15 | Resolved: 2024-09-14

## 2024-09-20 ENCOUNTER — APPOINTMENT (OUTPATIENT)
Dept: LAB | Facility: HOSPITAL | Age: 59
End: 2024-09-20
Payer: COMMERCIAL

## 2024-09-20 DIAGNOSIS — D64.9 ANEMIA, UNSPECIFIED TYPE: ICD-10-CM

## 2024-09-20 LAB
ERYTHROCYTE [DISTWIDTH] IN BLOOD BY AUTOMATED COUNT: 14.8 % (ref 11.6–15.1)
HCT VFR BLD AUTO: 38.1 % (ref 36.5–49.3)
HGB BLD-MCNC: 12.2 G/DL (ref 12–17)
MCH RBC QN AUTO: 28 PG (ref 26.8–34.3)
MCHC RBC AUTO-ENTMCNC: 32 G/DL (ref 31.4–37.4)
MCV RBC AUTO: 88 FL (ref 82–98)
PLATELET # BLD AUTO: 257 THOUSANDS/UL (ref 149–390)
PMV BLD AUTO: 10.6 FL (ref 8.9–12.7)
RBC # BLD AUTO: 4.35 MILLION/UL (ref 3.88–5.62)
WBC # BLD AUTO: 7.59 THOUSAND/UL (ref 4.31–10.16)

## 2024-09-20 PROCEDURE — 85027 COMPLETE CBC AUTOMATED: CPT

## 2024-09-20 PROCEDURE — 36415 COLL VENOUS BLD VENIPUNCTURE: CPT

## 2024-09-25 NOTE — RESULT ENCOUNTER NOTE
Hemoglobin is actually improved.  Now 12.2.  Unless there are active GI symptoms or continued bleeding, would not repeat endoscopy at this time.  Recommend repeat CBC in 6 to 8 weeks.  Rheumatology consultation as planned.

## 2024-09-25 NOTE — PROGRESS NOTES
Rheumatology Initial Outpatient Visit  Name: Haroldo Abreu      : 1965      MRN: [id number]  Encounter Provider: Kiera Kimball MD  Encounter Date: 2024   Encounter department: [facility] [place]    Assessment & Plan  Reactive arthritis, unspecified site (HCC)  59-year-old male who presents for further evaluation of acute episode of joint pain associated with rash.  Abrupt onset of severe inflammatory arthritis primarily affecting the knees, ankles, and wrists/hands which was associated with a migratory rash and sore throat.  Prior to the onset of symptoms he had received a vaccination as well as had exposure to COVID.  Extensive workup most significant for very elevated inflammatory markers, but otherwise without a unifying diagnosis.  He had moderate response to prednisone with improvement in his symptoms, though still continues to have some knee pain and discomfort in his hand/wrist.  Throughout episode there was no fevers.  Exam today only significant for a small right knee effusion and 2+ lower extremity pitting edema which may be chronic per patient.    Leading differential at this time is a reactive arthritis to either the vaccination or COVID exposure.  Based upon his previous symptoms and extensive workup, there does not seem to be any evidence of a connective tissue disease such as lupus and presentation not consistent with rheumatoid arthritis.  With the very elevated inflammatory markers, could also consider vasculitis.  ANCA vasculitis can present with arthralgias and with the lower extremity edema and prior history of pneumonia, could consider atypical presentation though urine studies checked in July were normal and renal function has been stable.    We discussed that reactive arthritis is a diagnosis of exclusion so we will obtain additional serologies as below.  We will also repeat his inflammatory markers to determine if they have improved since his symptoms  have also started to improve at this point in time.  We discussed that for most patients reactive arthritis is a self-limited illness that eventually resolves.  However for some patients it may become a more chronic arthritis that requires ongoing therapy, with the highest risk in those who are HLA-B27 positive.    We will put him back on scheduled Mobic and continue for at least 2 weeks to see if there is an improvement in his joint pain.  If he is unable to tolerate the Mobic or does not get significant improvement we can consider additional longer taper of prednisone but will try to avoid.  Will arrange for close follow-up in 4 weeks.  Will consider aspiration of the knee if the effusion has not resolved at this point, but given his symptoms are already improving and his recent steroid exposure aspiration of the knee is likely not to add much diagnostically at this time.  I have also asked him to avoid any vaccinations for the next few months until we ensure the disease is improving.    Orders:    Ambulatory Referral to Rheumatology    Comprehensive metabolic panel; Future    Sedimentation rate, automated; Future    C-reactive protein; Future    Anti-neutrophilic cytoplasmic antibody; Future    Hepatitis B core antibody, total; Future    Hepatitis B surface antibody; Future    Hepatitis B surface antigen; Future    Hepatitis C antibody; Future    Urinalysis with microscopic; Future    Protein / creatinine ratio, urine; Future    HLA-B27 antigen; Future      History of Present Illness     Haroldo Abreu is a 59 y.o. male who presents for evaluation of joint pain. PMHx includes GERD, melanoma, testicular Ca, HTN.  Patient and wife report that patient received the shingles vaccine on May 28.  Around mid June his wife developed COVID.  In early July he developed a red migratory rash that was present on his chest, abdomen, and upper arms.  The rash was not raised and seemed to be migratory between the various areas.   Around this time he also started to develop a persistent sore throat and fatigue.  Soon after this he developed abrupt onset of severe joint pain which would affect his hands, knees, and ankles.  His symptoms were so severe that he was unable to stand up or walk.  They do report that his fingers appeared to be swollen during this time.  He continued to have the rash and sore throat during this time.  Additionally, he started to develop more shortness of breath.  He also noticed hematochezia which was a previous problem he has had, but seem to get worse during this time.    He was seen by his primary care provider for his symptoms and had multiple labs checked for various viral infections but nothing came back to explain his symptoms.  He was treated with a Medrol Dosepak which did not seem to help.  He was also seen in the emergency department and was found to have possible pneumonia.  He was started on  doxycycline for the pneumonia as well as possible tick borne illness.  At the same time he was also started on meloxicam.  He reports that he started to develop more severe acid reflux and belching so he stopped taking the meloxicam but continued to complete the course of pneumonia.  He did not feel significantly better after completing the doxycycline so he did see a physician at a different [facility] practice who prescribed a 10-day course of prednisone.  He reports that he did feel much better on the prednisone though did not 100% relief of symptoms.    Since then, he has felt better although not totally back to his baseline.  He still feels tired and fatigued.  He still experiences pain in his bilateral knees which is present all day long.  He still has some difficulty with his hands as he feels like he does not have  strength and feels like it is hard to use his hands to open jars for example.  At night he does experience throbbing ankle pain of his right ankle.  He does not have a significant amount of  "stiffness in his joints currently.  The only other new symptom he has experienced is an intermittent black dot in his left eye, but no episodes of red painful eye or changes to his vision.  No description of dactylitis or urethritis.    Other history includes an episode of pneumonia about 2 years ago but no history of recurrent pneumonia or sinus infection.  He does have sensorineural hearing loss but this occurred when he was a child.  He had testicular cancer in his 30s.  He also had melanoma a few years ago.      Review of Systems  Complete ROS conducted as per HPI.      Objective     /84   Pulse 69   Ht 5' 8.5\" (1.74 m)   Wt 123 kg (272 lb)   SpO2 98%   BMI 40.76 kg/m²     Physical Exam  Physical Exam  Constitutional: well appearing, no acute distress  HEENT: normocephalic, sclera clear, no visible oral or nasal ulcers  Neck: supple, no palpable cervical adenopathy  CV: regular rate and rhythm, no murmur  Pulm: normal respiratory effort, lungs clear to auscultation b/l  Skin: no rashes, no skin thickening  Extremities: warm and well perfused, +2 pitting edema to midshin  MSK:  - Observation: Slight fullness of the right knee  -Palpation: No significant tenderness to palpation  - Synovitis: Absent  - Effusion: Small right knee effusion  - ROM: Normal    Labs and Imaging  I have personally reviewed pertinent labs and imaging.     CBC - previous leukocytosis with neutrophilia, now resolved  CMP nml (7/2024)    BANDAR, SSA, SSB, RF, CCP negative    CRP 57 --> 100.6 --> 101.2  ESR 77    UA 7/2024 w/p blood or protein    Bld culture x 1 negative 7/2024  EBV IgM negative, IgG positive indiicating prior infection  Parvo IgM negative, IgG positive indicating prior infection  Lyme/Tick panel negative    CT Chest:  Mild paraspinal ground glass opacity in the right lower lobe, infectious or inflammatory.     Mild diffuse bronchial wall thickening suggestive of bronchiolitis.     Mild right axillary lymphadenopathy " with nodes normal in size but increased in number, likely reactive.     Mild coronary artery calcification indicating atherosclerotic heart disease.     Stable 1.7 cm right thyroid nodule. Recommend further evaluation with ultrasound.     Stable 4.1 cm aortic root.     This study has been marked for immediate notification and follow up.

## 2024-09-27 ENCOUNTER — CONSULT (OUTPATIENT)
Dept: RHEUMATOLOGY | Facility: CLINIC | Age: 59
End: 2024-09-27
Payer: COMMERCIAL

## 2024-09-27 VITALS
OXYGEN SATURATION: 98 % | HEIGHT: 69 IN | WEIGHT: 272 LBS | HEART RATE: 69 BPM | BODY MASS INDEX: 40.29 KG/M2 | DIASTOLIC BLOOD PRESSURE: 84 MMHG | SYSTOLIC BLOOD PRESSURE: 144 MMHG

## 2024-09-27 DIAGNOSIS — M02.30 REACTIVE ARTHRITIS, UNSPECIFIED SITE (HCC): ICD-10-CM

## 2024-09-27 DIAGNOSIS — R79.82 ELEVATED C-REACTIVE PROTEIN (CRP): ICD-10-CM

## 2024-09-27 PROCEDURE — 99245 OFF/OP CONSLTJ NEW/EST HI 55: CPT | Performed by: STUDENT IN AN ORGANIZED HEALTH CARE EDUCATION/TRAINING PROGRAM

## 2024-09-30 ENCOUNTER — APPOINTMENT (OUTPATIENT)
Dept: LAB | Facility: HOSPITAL | Age: 59
End: 2024-09-30
Payer: COMMERCIAL

## 2024-09-30 DIAGNOSIS — M02.30 REACTIVE ARTHRITIS, UNSPECIFIED SITE (HCC): ICD-10-CM

## 2024-09-30 LAB
ALBUMIN SERPL BCG-MCNC: 4 G/DL (ref 3.5–5)
ALP SERPL-CCNC: 56 U/L (ref 34–104)
ALT SERPL W P-5'-P-CCNC: 12 U/L (ref 7–52)
ANION GAP SERPL CALCULATED.3IONS-SCNC: 5 MMOL/L (ref 4–13)
AST SERPL W P-5'-P-CCNC: 16 U/L (ref 13–39)
BACTERIA UR QL AUTO: ABNORMAL /HPF
BILIRUB SERPL-MCNC: 0.58 MG/DL (ref 0.2–1)
BILIRUB UR QL STRIP: NEGATIVE
BUN SERPL-MCNC: 15 MG/DL (ref 5–25)
CALCIUM SERPL-MCNC: 9 MG/DL (ref 8.4–10.2)
CHLORIDE SERPL-SCNC: 105 MMOL/L (ref 96–108)
CLARITY UR: CLEAR
CO2 SERPL-SCNC: 29 MMOL/L (ref 21–32)
COLOR UR: ABNORMAL
CREAT SERPL-MCNC: 0.78 MG/DL (ref 0.6–1.3)
CREAT UR-MCNC: 13.7 MG/DL
CRP SERPL QL: 7.7 MG/L
ERYTHROCYTE [SEDIMENTATION RATE] IN BLOOD: 30 MM/HOUR (ref 0–19)
GFR SERPL CREATININE-BSD FRML MDRD: 98 ML/MIN/1.73SQ M
GLUCOSE SERPL-MCNC: 79 MG/DL (ref 65–140)
GLUCOSE UR STRIP-MCNC: NEGATIVE MG/DL
HBV CORE AB SER QL: NORMAL
HBV SURFACE AB SER-ACNC: >500 MIU/ML
HBV SURFACE AG SER QL: NORMAL
HCV AB SER QL: NORMAL
HGB UR QL STRIP.AUTO: NEGATIVE
KETONES UR STRIP-MCNC: NEGATIVE MG/DL
LEUKOCYTE ESTERASE UR QL STRIP: NEGATIVE
NITRITE UR QL STRIP: NEGATIVE
NON-SQ EPI CELLS URNS QL MICRO: ABNORMAL /HPF
PH UR STRIP.AUTO: 7 [PH]
POTASSIUM SERPL-SCNC: 4.1 MMOL/L (ref 3.5–5.3)
PROT SERPL-MCNC: 7.2 G/DL (ref 6.4–8.4)
PROT UR STRIP-MCNC: NEGATIVE MG/DL
PROT UR-MCNC: <4 MG/DL
RBC #/AREA URNS AUTO: ABNORMAL /HPF
SODIUM SERPL-SCNC: 139 MMOL/L (ref 135–147)
SP GR UR STRIP.AUTO: <1.005 (ref 1–1.03)
UROBILINOGEN UR STRIP-ACNC: <2 MG/DL
WBC #/AREA URNS AUTO: ABNORMAL /HPF

## 2024-09-30 PROCEDURE — 82570 ASSAY OF URINE CREATININE: CPT

## 2024-09-30 PROCEDURE — 80053 COMPREHEN METABOLIC PANEL: CPT

## 2024-09-30 PROCEDURE — 85652 RBC SED RATE AUTOMATED: CPT

## 2024-09-30 PROCEDURE — 86706 HEP B SURFACE ANTIBODY: CPT

## 2024-09-30 PROCEDURE — 86140 C-REACTIVE PROTEIN: CPT

## 2024-09-30 PROCEDURE — 87340 HEPATITIS B SURFACE AG IA: CPT

## 2024-09-30 PROCEDURE — 86037 ANCA TITER EACH ANTIBODY: CPT

## 2024-09-30 PROCEDURE — 36415 COLL VENOUS BLD VENIPUNCTURE: CPT

## 2024-09-30 PROCEDURE — 84156 ASSAY OF PROTEIN URINE: CPT

## 2024-09-30 PROCEDURE — 83520 IMMUNOASSAY QUANT NOS NONAB: CPT

## 2024-09-30 PROCEDURE — 86704 HEP B CORE ANTIBODY TOTAL: CPT

## 2024-09-30 PROCEDURE — 81374 HLA I TYPING 1 ANTIGEN LR: CPT

## 2024-09-30 PROCEDURE — 81001 URINALYSIS AUTO W/SCOPE: CPT

## 2024-09-30 PROCEDURE — 86803 HEPATITIS C AB TEST: CPT

## 2024-10-02 LAB
C-ANCA TITR SER IF: NORMAL TITER
MYELOPEROXIDASE AB SER IA-ACNC: <0.2 UNITS (ref 0–0.9)
P-ANCA ATYPICAL TITR SER IF: NORMAL TITER
P-ANCA TITR SER IF: NORMAL TITER
PROTEINASE3 AB SER IA-ACNC: <0.2 UNITS (ref 0–0.9)

## 2024-10-07 LAB — HLA-B27 QL NAA+PROBE: NEGATIVE

## 2024-10-18 NOTE — PROGRESS NOTES
Rheumatology Follow-up Visit  Name: Haroldo Abreu      : 1965      MRN: [id number]  Encounter Provider: Kiera Kimball MD  Encounter Date: 10/22/2024   Encounter department: [facility] [address] [place] [address]    Assessment & Plan  Reactive arthritis, unspecified site (HCC)  59-year-old male who presents for follow-up of reactive arthritis.  Overall, he has had slow improvement in his symptoms while continuing on Mobic.  He reports some mild right greater than left knee pain with kneeling, or standing from seated position that improves within a few steps.  Small cool left knee effusion on exam. Recommend continue with Mobic for now. We will get knee xrays to evaluate for any OA which could also contribute to pain and repeat inflammatory markers to ensure they are improving. Follow-up in 6-8 weeks. If doing well at that time, then will consider tapering Mobic of stopping.   Orders:    XR knee 3 vw right non injury; Future    XR knee 3 vw left non injury; Future    C-reactive protein; Future    Sedimentation rate, automated; Future    meloxicam (MOBIC) 15 mg tablet; Take 1 tablet (15 mg total) by mouth daily      History of Present Illness     Haroldo Abreu is a 59 y.o. male who presents for follow-up suspected reactive arthritis.     Interval History:  Patient reports overall he is doing well. He has some mild L > r knee pain when he rises from seated position or when going from kneeling to standing. No significant stiffness. Swelling improved. Few episodes of hand tingling at night - discussed carpal tunnel.     Permanent History:  Presented with abrupt onset of severe inflammatory arthritis primarily affecting the knees, ankles, and wrists/hands which was associated with a migratory rash and sore throat.  Prior to the onset of symptoms he had received a vaccination as well as had exposure to COVID.  Extensive workup significant for very elevated inflammatory markers, but otherwise unremarkable.  "Leading differential at this time is a reactive arthritis (HLA-B27 negative). Patient resumed on Mobic at initial visit.     Review of Systems  Complete ROS conducted as per HPI. In addition, denies:  Fever  Photosensitive rash  Sicca symptoms  Recurrent oral ulcers  Muscle weakness  Uveitis  Dactylitis  Chest pain  SOB  Pleurisy  Gross hematuria  Foamy urine  Raynaud's  Joint issues other than noted above        Objective     /80   Pulse 75   Ht 5' 8.5\" (1.74 m)   Wt 124 kg (274 lb)   SpO2 97%   BMI 41.06 kg/m²     Physical Exam  Physical Exam  Constitutional: well appearing, no acute distress  HEENT: normocephalic, sclera clear, no visible oral or nasal ulcers  Neck: supple, no palpable cervical adenopathy  CV: regular rate and rhythm, no murmur  Pulm: normal respiratory effort, lungs clear to auscultation b/l  Skin: no rashes, no skin thickening  Extremities: warm and well perfused, no edema  MSK:small cool L knee effusion, otherwise no significant synovitis or effusions on exam    Labs and Imaging  I have personally reviewed pertinent labs and imaging.     "

## 2024-10-22 ENCOUNTER — OFFICE VISIT (OUTPATIENT)
Age: 59
End: 2024-10-22
Payer: COMMERCIAL

## 2024-10-22 VITALS
HEART RATE: 75 BPM | DIASTOLIC BLOOD PRESSURE: 80 MMHG | BODY MASS INDEX: 40.58 KG/M2 | HEIGHT: 69 IN | WEIGHT: 274 LBS | SYSTOLIC BLOOD PRESSURE: 128 MMHG | OXYGEN SATURATION: 97 %

## 2024-10-22 DIAGNOSIS — M02.30 REACTIVE ARTHRITIS, UNSPECIFIED SITE (HCC): Primary | ICD-10-CM

## 2024-10-22 PROCEDURE — 99214 OFFICE O/P EST MOD 30 MIN: CPT | Performed by: STUDENT IN AN ORGANIZED HEALTH CARE EDUCATION/TRAINING PROGRAM

## 2024-10-22 RX ORDER — MELOXICAM 15 MG/1
15 TABLET ORAL DAILY
COMMUNITY
End: 2024-10-22 | Stop reason: SDUPTHER

## 2024-10-22 RX ORDER — TADALAFIL 5 MG/1
TABLET ORAL EVERY 24 HOURS
COMMUNITY

## 2024-10-22 RX ORDER — RABEPRAZOLE SODIUM 20 MG/1
TABLET, DELAYED RELEASE ORAL EVERY 24 HOURS
COMMUNITY

## 2024-10-22 RX ORDER — MELOXICAM 15 MG/1
15 TABLET ORAL DAILY
Qty: 30 TABLET | Refills: 2 | Status: SHIPPED | OUTPATIENT
Start: 2024-10-22

## 2024-10-22 RX ORDER — LISINOPRIL 20 MG/1
TABLET ORAL EVERY 24 HOURS
COMMUNITY

## 2024-10-22 RX ORDER — KETOCONAZOLE 20 MG/G
1 CREAM TOPICAL EVERY 24 HOURS
COMMUNITY

## 2024-11-06 ENCOUNTER — HOSPITAL ENCOUNTER (OUTPATIENT)
Dept: RADIOLOGY | Facility: HOSPITAL | Age: 59
Discharge: HOME | End: 2024-11-06
Payer: COMMERCIAL

## 2024-11-06 ENCOUNTER — LAB (OUTPATIENT)
Dept: LAB | Facility: HOSPITAL | Age: 59
End: 2024-11-06
Payer: COMMERCIAL

## 2024-11-06 DIAGNOSIS — D64.9 ANEMIA, UNSPECIFIED TYPE: ICD-10-CM

## 2024-11-06 DIAGNOSIS — M02.30 REACTIVE ARTHRITIS, UNSPECIFIED SITE (HCC): ICD-10-CM

## 2024-11-06 LAB
CRP SERPL QL: 5.1 MG/L
ERYTHROCYTE [DISTWIDTH] IN BLOOD BY AUTOMATED COUNT: 13.2 % (ref 11.6–15.1)
ERYTHROCYTE [SEDIMENTATION RATE] IN BLOOD: 24 MM/HOUR (ref 0–19)
HCT VFR BLD AUTO: 38.5 % (ref 36.5–49.3)
HGB BLD-MCNC: 13 G/DL (ref 12–17)
MCH RBC QN AUTO: 28.8 PG (ref 26.8–34.3)
MCHC RBC AUTO-ENTMCNC: 33.8 G/DL (ref 31.4–37.4)
MCV RBC AUTO: 85 FL (ref 82–98)
PLATELET # BLD AUTO: 236 THOUSANDS/UL (ref 149–390)
PMV BLD AUTO: 11.4 FL (ref 8.9–12.7)
RBC # BLD AUTO: 4.52 MILLION/UL (ref 3.88–5.62)
WBC # BLD AUTO: 7.11 THOUSAND/UL (ref 4.31–10.16)

## 2024-11-06 PROCEDURE — 36415 COLL VENOUS BLD VENIPUNCTURE: CPT

## 2024-11-06 PROCEDURE — 73562 X-RAY EXAM OF KNEE 3: CPT

## 2024-11-06 PROCEDURE — 85027 COMPLETE CBC AUTOMATED: CPT

## 2024-11-06 PROCEDURE — 86140 C-REACTIVE PROTEIN: CPT

## 2024-11-06 PROCEDURE — 85652 RBC SED RATE AUTOMATED: CPT

## 2024-11-07 DIAGNOSIS — D64.9 ANEMIA, UNSPECIFIED TYPE: Primary | ICD-10-CM

## 2024-11-07 NOTE — RESULT ENCOUNTER NOTE
Please tell patient:   Hemoglobin continues to improve.  In normal range at 13 presently.  Unless there are clinical signs of rebleeding, would repeat in 6 months.

## 2024-12-06 ENCOUNTER — OFFICE VISIT (OUTPATIENT)
Dept: FAMILY MEDICINE CLINIC | Facility: CLINIC | Age: 59
End: 2024-12-06
Payer: COMMERCIAL

## 2024-12-06 VITALS
HEIGHT: 69 IN | WEIGHT: 275 LBS | TEMPERATURE: 97.8 F | BODY MASS INDEX: 40.73 KG/M2 | RESPIRATION RATE: 18 BRPM | SYSTOLIC BLOOD PRESSURE: 130 MMHG | DIASTOLIC BLOOD PRESSURE: 82 MMHG | OXYGEN SATURATION: 98 % | HEART RATE: 82 BPM

## 2024-12-06 DIAGNOSIS — E04.1 THYROID NODULE: ICD-10-CM

## 2024-12-06 DIAGNOSIS — M17.0 PRIMARY OSTEOARTHRITIS OF BOTH KNEES: ICD-10-CM

## 2024-12-06 DIAGNOSIS — I10 ESSENTIAL HYPERTENSION: Primary | ICD-10-CM

## 2024-12-06 DIAGNOSIS — K21.9 GASTROESOPHAGEAL REFLUX DISEASE, UNSPECIFIED WHETHER ESOPHAGITIS PRESENT: ICD-10-CM

## 2024-12-06 PROCEDURE — 99213 OFFICE O/P EST LOW 20 MIN: CPT | Performed by: FAMILY MEDICINE

## 2024-12-06 NOTE — PROGRESS NOTES
Name: Haroldo Abreu      : 1965      MRN: [id number]  Encounter Provider: Sherlyn Victoria DO  Encounter Date: 2024   Encounter department: [facility]    Assessment & Plan  Essential hypertension  Bp at goal on current meds  Continue same.   Check cmp prior to next OV  Orders:  •  Lipid panel  •  Comprehensive metabolic panel    Primary osteoarthritis of both knees  Reviewed his rheum consultation as well as the Xray of bilateral knees done in November  Has f/u with rheum next week  On meloxicam       Thyroid nodule  Seen on CT of chest done in ED in July  Check US of thyroid.   Orders:  •  US thyroid; Future    Gastroesophageal reflux disease, unspecified whether esophagitis present  Stable on aciphex            History of Present Illness     HPI  Patient is a 59 year old male with hypertension, dilated aortic root, GERD, and history of melanoma who is being seen today for routine f/u on his blood pressure    Following with rheumatology for reactive arthritis and is on meloxicam for this. Better overall but still having a lot of pain in his knees. Wondering if maybe some OA in knees as well. Due to f/u with rheum next week. Had xrays done in November and showed moderate medical compartment arthritis in left knee and moderate to severe in right knee.     Seen in ED in July for pneumonia and had CT of chest which showed Stable 1.7 cm right thyroid nodule. Recommend further evaluation with ultrasound     Declines flu vaccine today noting that his rheumatologist told him not to have this done.   Review of Systems  Past Medical History:   Diagnosis Date   • Cancer (HCC) 2018   • Colon polyp    • COVID-19 2020   • GERD (gastroesophageal reflux disease)    • HL (hearing loss) 1974    Tumor   • Melanoma (HCC) 2017    Dermatology and Moh's surgery in [place]; Right lateral upper back   • Pneumonia 2023    I have had a cough since early    •  Testicular cancer (HCC) 2000    Dr monterroso and yony (urology)     Past Surgical History:   Procedure Laterality Date   • COLONOSCOPY      January 2021 small cecal serrated adenoma, sigmoid diverticulosis, internal hemorrhoids   • ORCHIECTOMY Right    • SKIN CANCER EXCISION     • TUMOR EXCISION      right ear as a child   • UPPER GASTROINTESTINAL ENDOSCOPY      January 2021: Schatzki ring biopsies negative for Johnson's or EOE, fundic gland polyps, mild gastritis negative for H pylori.   September 2014 Schatzki ring biopsies negative for Johnson's EOE or H pylori.     Family History   Problem Relation Age of Onset   • Diabetes Mother    • Hypertension Mother    • Hypertension Father    • Heart disease Father    • Heart failure Father         Congestive   • No Known Problems Sister    • No Known Problems Brother    • No Known Problems Brother    • Colon polyps Neg Hx    • Colon cancer Neg Hx      Social History     Tobacco Use   • Smoking status: Never     Passive exposure: Never   • Smokeless tobacco: Never   Vaping Use   • Vaping status: Never Used   Substance and Sexual Activity   • Alcohol use: Yes     Alcohol/week: 1.0 standard drink of alcohol     Types: 1 Cans of beer per week     Comment: socially   • Drug use: Never   • Sexual activity: Yes     Partners: Female     Birth control/protection: None     Current Outpatient Medications on File Prior to Visit   Medication Sig   • ketoconazole (NIZORAL) 2 % cream Apply 2x/day to bottom of feet and in between toes for 3 weeks. Apply on facial redness/rash as needed.   • lisinopril (ZESTRIL) 20 mg tablet Take 1 tablet (20 mg total) by mouth daily   • meloxicam (MOBIC) 15 mg tablet Take 1 tablet (15 mg total) by mouth daily   • Multiple Vitamins-Minerals (MULTIVITAMIN ADULTS PO) Take 1 capsule by mouth daily   • RABEprazole (ACIPHEX) 20 MG tablet Take 1 tablet (20 mg total) by mouth daily   • tadalafil (CIALIS) 5 MG tablet Take 1 tablet (5 mg total) by mouth in the  "morning   • triamcinolone (KENALOG) 0.025 % cream Apply topically 2 (two) times a day Apply sparingly 2-4 times a day for up to one week.   • [DISCONTINUED] ketoconazole (NIZORAL) 2 % cream 1 Application every 24 hours (Patient not taking: Reported on 10/22/2024)   • [DISCONTINUED] lisinopril (ZESTRIL) 20 mg tablet every 24 hours (Patient not taking: Reported on 10/22/2024)   • [DISCONTINUED] RABEprazole (ACIPHEX) 20 MG tablet every 24 hours (Patient not taking: Reported on 10/22/2024)   • [DISCONTINUED] tadalafil (CIALIS) 5 MG tablet every 24 hours (Patient not taking: Reported on 10/22/2024)     No Known Allergies  Immunization History   Administered Date(s) Administered   • COVID-19 PFIZER VACCINE 0.3 ML IM 03/18/2021, 04/12/2021, 12/06/2021   • INFLUENZA 10/20/2022   • Influenza Quadrivalent Recombinant Preservative Free IM 10/28/2021   • Tdap 01/14/2022   • Zoster Vaccine Recombinant 05/28/2024     Objective   /82   Pulse 82   Temp 97.8 °F (36.6 °C) (Tympanic)   Resp 18   Ht 5' 8.5\" (1.74 m)   Wt 125 kg (275 lb)   SpO2 98%   BMI 41.21 kg/m²     Physical Exam  Vitals and nursing note reviewed.   Constitutional:       General: He is not in acute distress.     Appearance: Normal appearance. He is obese. He is not ill-appearing, toxic-appearing or diaphoretic.   HENT:      Head: Normocephalic and atraumatic.   Neck:      Comments: No thyromegaly  Pulmonary:      Effort: Pulmonary effort is normal.      Breath sounds: Normal breath sounds.   Abdominal:      General: Abdomen is flat. Bowel sounds are normal.      Palpations: Abdomen is soft.   Musculoskeletal:      Cervical back: Normal range of motion and neck supple.      Right lower leg: No edema.      Left lower leg: No edema.   Lymphadenopathy:      Cervical: No cervical adenopathy.   Neurological:      Mental Status: He is alert.   Psychiatric:         Mood and Affect: Mood normal.     "

## 2024-12-06 NOTE — ASSESSMENT & PLAN NOTE
Reviewed his rheum consultation as well as the Xray of bilateral knees done in November  Has f/u with rheum next week  On meloxicam

## 2024-12-09 NOTE — PROGRESS NOTES
Rheumatology Follow-up Visit  Name: Haroldo Abreu      : 1965      MRN: [id number]  Encounter Provider: Kiera Kimball MD  Encounter Date: 2024   Encounter department: [facility] [address] [place] [address]    Assessment & Plan  Reactive arthritis, unspecified site (HCC)  59-year-old male who presents for follow-up of reactive arthritis.  Fortunately, he has not had any recurrence of his initial presenting symptoms of inflammatory arthritis or rash.  At this time, I would say that his reactive arthritis has resolved.      His only lingering joint complaint is bilateral knee pain with activity which is likely due to primary osteoarthritis of the bilateral knees.  Discussed osteoarthritis treatment recommendations.  Will place referral to orthopedics for further recommendations regarding primary knee osteoarthritis.  Discussed that he does not specifically need the Mobic anymore for the reactive arthritis since the symptoms have resolved, however if he stops it and notices that the knee pain gets worse he could continue the Mobic for primary knee osteoarthritis.  Will tentatively plan a follow-up in 3 months, but discussed that if he does not get any recurrence of the inflammatory arthritis or skin rash he may cancel this appointment and follow-up with PCP for ongoing management of primary osteoarthritis.       Primary osteoarthritis of both knees    Orders:    Ambulatory Referral to Orthopedic Surgery; Future      History of Present Illness     Haroldo Abreu is a 59 y.o. male who presents for follow-up suspected reactive arthritis.     Interval History:  Patient reports overall he is doing well.  Continues to have bilateral knee pain.  Worst is when he is going from squatting to standing.    Permanent History:  Presented with abrupt onset of severe inflammatory arthritis primarily affecting the knees, ankles, and wrists/hands which was associated with a migratory rash and sore throat.  Prior to  "the onset of symptoms he had received a vaccination as well as had exposure to COVID.  Extensive workup significant for very elevated inflammatory markers, but otherwise unremarkable. Leading differential at this time is a reactive arthritis (HLA-B27 negative). Patient resumed on Mobic at initial visit which improved his symptoms. Inflammatory markers improved. Xray of knees with bilateral medial compartment OA.     Review of Systems  Complete ROS conducted as per HPI. In addition, denies:  Fever  Photosensitive rash  Sicca symptoms  Recurrent oral ulcers  Muscle weakness  Uveitis  Dactylitis  Chest pain  SOB  Pleurisy  Gross hematuria  Foamy urine  Raynaud's  Joint issues other than noted above    Objective     /64 (BP Location: Right arm, Patient Position: Sitting, Cuff Size: Adult)   Pulse 73   Temp (!) 97.3 °F (36.3 °C) (Tympanic)   Ht 5' 8.5\" (1.74 m)   Wt 125 kg (275 lb 9.2 oz)   SpO2 98%   BMI 41.29 kg/m²     Physical Exam  Physical Exam  Constitutional: well appearing, no acute distress  HEENT: normocephalic, sclera clear, no visible oral or nasal ulcers  Neck: supple, no palpable cervical adenopathy  CV: regular rate and rhythm, no murmur  Pulm: normal respiratory effort, lungs clear to auscultation b/l  Skin: no rashes, no skin thickening  Extremities: warm and well perfused, no edema  MSK: No synovitis or effusions    Labs and Imaging  I have personally reviewed pertinent labs and imaging.     "

## 2024-12-11 ENCOUNTER — OFFICE VISIT (OUTPATIENT)
Age: 59
End: 2024-12-11

## 2024-12-11 VITALS
OXYGEN SATURATION: 98 % | WEIGHT: 275.57 LBS | HEIGHT: 69 IN | BODY MASS INDEX: 40.82 KG/M2 | TEMPERATURE: 97.3 F | HEART RATE: 73 BPM | DIASTOLIC BLOOD PRESSURE: 64 MMHG | SYSTOLIC BLOOD PRESSURE: 132 MMHG

## 2024-12-11 DIAGNOSIS — M02.30 REACTIVE ARTHRITIS, UNSPECIFIED SITE (HCC): Primary | ICD-10-CM

## 2024-12-11 DIAGNOSIS — M17.0 PRIMARY OSTEOARTHRITIS OF BOTH KNEES: ICD-10-CM

## 2024-12-26 ENCOUNTER — NURSE TRIAGE (OUTPATIENT)
Age: 59
End: 2024-12-26

## 2024-12-26 DIAGNOSIS — M02.30 REACTIVE ARTHRITIS, UNSPECIFIED SITE (HCC): Primary | ICD-10-CM

## 2024-12-27 ENCOUNTER — APPOINTMENT (OUTPATIENT)
Dept: LAB | Facility: HOSPITAL | Age: 59
End: 2024-12-27
Payer: COMMERCIAL

## 2024-12-27 ENCOUNTER — PATIENT MESSAGE (OUTPATIENT)
Age: 59
End: 2024-12-27

## 2024-12-27 DIAGNOSIS — M02.30 REACTIVE ARTHRITIS, UNSPECIFIED SITE (HCC): ICD-10-CM

## 2024-12-27 DIAGNOSIS — R05.2 SUBACUTE COUGH: Primary | ICD-10-CM

## 2024-12-27 LAB
BASOPHILS # BLD AUTO: 0.02 THOUSANDS/ÂΜL (ref 0–0.1)
BASOPHILS NFR BLD AUTO: 0 % (ref 0–1)
CRP SERPL QL: 106.9 MG/L
EOSINOPHIL # BLD AUTO: 0.15 THOUSAND/ÂΜL (ref 0–0.61)
EOSINOPHIL NFR BLD AUTO: 2 % (ref 0–6)
ERYTHROCYTE [DISTWIDTH] IN BLOOD BY AUTOMATED COUNT: 13 % (ref 11.6–15.1)
ERYTHROCYTE [SEDIMENTATION RATE] IN BLOOD: 55 MM/HOUR (ref 0–19)
FERRITIN SERPL-MCNC: 215 NG/ML (ref 24–336)
HCT VFR BLD AUTO: 38.7 % (ref 36.5–49.3)
HGB BLD-MCNC: 12.6 G/DL (ref 12–17)
IMM GRANULOCYTES # BLD AUTO: 0.07 THOUSAND/UL (ref 0–0.2)
IMM GRANULOCYTES NFR BLD AUTO: 1 % (ref 0–2)
LYMPHOCYTES # BLD AUTO: 1.33 THOUSANDS/ÂΜL (ref 0.6–4.47)
LYMPHOCYTES NFR BLD AUTO: 14 % (ref 14–44)
MCH RBC QN AUTO: 27.6 PG (ref 26.8–34.3)
MCHC RBC AUTO-ENTMCNC: 32.6 G/DL (ref 31.4–37.4)
MCV RBC AUTO: 85 FL (ref 82–98)
MONOCYTES # BLD AUTO: 0.92 THOUSAND/ÂΜL (ref 0.17–1.22)
MONOCYTES NFR BLD AUTO: 10 % (ref 4–12)
NEUTROPHILS # BLD AUTO: 6.95 THOUSANDS/ÂΜL (ref 1.85–7.62)
NEUTS SEG NFR BLD AUTO: 73 % (ref 43–75)
NRBC BLD AUTO-RTO: 0 /100 WBCS
PLATELET # BLD AUTO: 262 THOUSANDS/UL (ref 149–390)
PMV BLD AUTO: 11 FL (ref 8.9–12.7)
RBC # BLD AUTO: 4.57 MILLION/UL (ref 3.88–5.62)
WBC # BLD AUTO: 9.44 THOUSAND/UL (ref 4.31–10.16)

## 2024-12-27 PROCEDURE — 86140 C-REACTIVE PROTEIN: CPT

## 2024-12-27 PROCEDURE — 82728 ASSAY OF FERRITIN: CPT

## 2024-12-27 PROCEDURE — 85025 COMPLETE CBC W/AUTO DIFF WBC: CPT

## 2024-12-27 PROCEDURE — 85652 RBC SED RATE AUTOMATED: CPT

## 2024-12-27 PROCEDURE — 36415 COLL VENOUS BLD VENIPUNCTURE: CPT

## 2024-12-27 RX ORDER — PREDNISONE 10 MG/1
20 TABLET ORAL DAILY
Qty: 60 TABLET | Refills: 0 | Status: SHIPPED | OUTPATIENT
Start: 2024-12-27

## 2024-12-27 NOTE — TELEPHONE ENCOUNTER
Spoke with pt and made him aware of orders Dr. Kimball placed for labs and to get those done prior to starting Prendiosne. While on the prednisone to stop Mobic. And checked to see if pt can come in on Tuesday at 12pm per Dr. Kimball. Pt states he can make it and will be there and will get labs done and take Prednisone.

## 2024-12-27 NOTE — TELEPHONE ENCOUNTER
Please let him know I received the pictures. I placed updated lab orders in his chart. Please go to lab to get these done. I will send Rx for prednisone, but please get labs done prior to starting prednisone to make sure labs are accurate. Stop Mobic while on prednisone. Can he come Tuesday at 12:00pm? If he does develop fever prior to then, please keep track of how high and time of day fever occurring.

## 2024-12-27 NOTE — PATIENT COMMUNICATION
Called and spoke with pt. He is experiencing rash since Sunday. States it was much worse yesterday but slightly better today. Also experiencing swollen joints (specifically the wrists and right ankle) as well as tingling in the fingers. Denies fever or chills. Did offer pt opening today at 3:15 but he is unable to make this appt. He can come in Tuesday morning. Unsure what time was available as I do not see any openings in schedule.     He will upload photos of rash/swollen joints to [employer].

## 2024-12-27 NOTE — TELEPHONE ENCOUNTER
"Reason for Disposition  • Mild localized rash    Answer Assessment - Initial Assessment Questions  1. APPEARANCE of RASH: \"Describe the rash.\"       Red  2. LOCATION: \"Where is the rash located?\"       Abdomen, arms  3. NUMBER: \"How many spots are there?\"       Unspecified  4. SIZE: \"How big are the spots?\" (Inches, centimeters or compare to size of a coin)       Unspecified  5. ONSET: \"When did the rash start?\"       Sunday  6. ITCHING: \"Does the rash itch?\" If Yes, ask: \"How bad is the itch?\"  (Scale 0-10; or none, mild, moderate, severe)        7. PAIN: \"Does the rash hurt?\" If Yes, ask: \"How bad is the pain?\"  (Scale 0-10; or none, mild, moderate, severe)      No  8. OTHER SYMPTOMS: \"Do you have any other symptoms?\" (e.g., fever)      Swollen joints, specifically wrists and right ankle. Tingling in hands. Denies fever.    Protocols used: Rash or Redness - Localized-Adult-[place]    "

## 2024-12-28 ENCOUNTER — APPOINTMENT (OUTPATIENT)
Dept: LAB | Facility: HOSPITAL | Age: 59
End: 2024-12-28
Payer: COMMERCIAL

## 2024-12-28 DIAGNOSIS — M02.30 REITER'S DISEASE (HCC): ICD-10-CM

## 2024-12-28 LAB
ALBUMIN SERPL BCG-MCNC: 3.7 G/DL (ref 3.5–5)
ALP SERPL-CCNC: 61 U/L (ref 34–104)
ALT SERPL W P-5'-P-CCNC: 15 U/L (ref 7–52)
ANION GAP SERPL CALCULATED.3IONS-SCNC: 6 MMOL/L (ref 4–13)
AST SERPL W P-5'-P-CCNC: 16 U/L (ref 13–39)
BILIRUB SERPL-MCNC: 0.62 MG/DL (ref 0.2–1)
BUN SERPL-MCNC: 18 MG/DL (ref 5–25)
CALCIUM SERPL-MCNC: 8.3 MG/DL (ref 8.4–10.2)
CHLORIDE SERPL-SCNC: 105 MMOL/L (ref 96–108)
CHOLEST SERPL-MCNC: 163 MG/DL (ref ?–200)
CO2 SERPL-SCNC: 26 MMOL/L (ref 21–32)
CREAT SERPL-MCNC: 0.81 MG/DL (ref 0.6–1.3)
GFR SERPL CREATININE-BSD FRML MDRD: 97 ML/MIN/1.73SQ M
GLUCOSE P FAST SERPL-MCNC: 96 MG/DL (ref 65–99)
HDLC SERPL-MCNC: 42 MG/DL
LDLC SERPL CALC-MCNC: 99 MG/DL (ref 0–100)
NONHDLC SERPL-MCNC: 121 MG/DL
POTASSIUM SERPL-SCNC: 4.2 MMOL/L (ref 3.5–5.3)
PROT SERPL-MCNC: 7.4 G/DL (ref 6.4–8.4)
SODIUM SERPL-SCNC: 137 MMOL/L (ref 135–147)
TRIGL SERPL-MCNC: 112 MG/DL (ref ?–150)

## 2024-12-29 LAB — B BURGDOR IGG+IGM SER QL IA: NEGATIVE

## 2024-12-30 ENCOUNTER — RESULTS FOLLOW-UP (OUTPATIENT)
Dept: FAMILY MEDICINE CLINIC | Facility: CLINIC | Age: 59
End: 2024-12-30

## 2024-12-30 ENCOUNTER — RESULTS FOLLOW-UP (OUTPATIENT)
Age: 59
End: 2024-12-30

## 2024-12-30 NOTE — PATIENT COMMUNICATION
Pt called in very confuse as to why he is being schedule a month out . When  wrote to him she will review results for tomorrow appt . I advise the patient he is schedule for 1/31 patient stated he will like to be seen tomorrow. He can't not wait a month to be seen with the pain he is having . Please advise

## 2024-12-30 NOTE — PROGRESS NOTES
Rheumatology Follow-up Visit  Name: Haroldo Abreu      : 1965      MRN: [id number]  Encounter Provider: Kiera Kimball MD  Encounter Date: 2024   Encounter department: [facility] [address]    Assessment & Plan  Rash  59-year-old male who presents for follow-up.  Patient was initially seen in September after he had developed an acute illness characterized by rash, inflammatory arthritis, sore throat, and cough.  Extensive workup at that time was only significant for elevated inflammatory markers.  Symptoms improved with a course of steroids and eventually resolved by the time he was seen in Rheumatology. Patient was relatively asymptomatic up until 1 week ago when he developed recurrence of his initial presentation including inflammatory arthritis, rash, sore throat, and cough.  He has had no fevers or night sweats with either of the episodes.  Repeat inflammatory markers are again significantly elevated.  Initially, consideration was for reactive process related to a possible infectious exposure he had preceding the first episode.  With this episode, he was around his wife and grandkids that were sick preceding the onset of the symptoms but suspect that there is a different underlying process given his symptoms are exactly the same as the first episode.  The lack of fever would make AOSD unlikely and would also make other auto inflammatory etiologies unlikely as well.  Other consideration could be Sweet's syndrome, although again these patients usually also have fever.  Could consider connective tissue disease such as SLE versus DM, however BANDAR negative x 2 and there is no photosensitive component to the rash.  Infectious etiology also seems unlikely given the duration of symptoms and that he has no risk factors for atypical infections.  Sarcoidosis could be considered although prior CT of the chest only showed mild right axillary lymphadenopathy which was thought to be secondary  to pneumonia at that time.    Discussed recommendations with patient at this time.  I recommend that we refer him to dermatology to undergo biopsy of the rash.  This could help with further diagnostics but also could help rule out certain diagnoses (such as SLE or DM or sarcoid) depending on the results.  May also consider repeating chest imaging given the recurrence of the cough.  Patient reports    I have placed him back on prednisone and would likely plan to add methotrexate pending the results of skin biopsy.  Patient was provided some material on methotrexate today.  We will keep his follow-up appointment scheduled for now but may adjust this pending the results of biopsy.  Patient will also let me know should he develop a fever or any other new symptoms.    Orders:    Ambulatory Referral to Dermatology; Future    Reactive arthritis, unspecified site (HCC)    Orders:    Ambulatory Referral to Dermatology; Future      History of Present Illness     Haroldo Abreu is a 59 y.o. male who presents for follow-up suspected reactive arthritis.     Interval History:  Last week he had recurrence of his initial presenting symptoms.  Initially began with some mild sore throat and then the rash started.  Rash is present on his chest, back, bilateral upper arms.  It had also come down into the forearm area but that seems to have resolved now.  Additionally, he started to develop recurrence of the inflammatory joint symptoms.  Pain and stiffness and swelling of the hands knees and ankles.  He has been taking prednisone 20 mg the past couple of days but this has not improved his symptoms thus far.    Permanent History:  Presented with abrupt onset of severe inflammatory arthritis primarily affecting the knees, ankles, and wrists/hands which was associated with a migratory rash and sore throat.  Prior to the onset of symptoms he had received a vaccination as well as had exposure to COVID.  Extensive workup significant for very  "elevated inflammatory markers, but otherwise unremarkable. Leading differential at this time is a reactive arthritis (HLA-B27 negative). Patient resumed on Mobic at initial visit which improved his symptoms. Inflammatory markers improved. Xray of knees with bilateral medial compartment OA.     Review of Systems   Constitutional:  Positive for fatigue. Negative for fever.   HENT:  Positive for congestion and sore throat. Negative for rhinorrhea.    Eyes:  Negative for pain.   Respiratory:  Positive for cough and shortness of breath.    Gastrointestinal:  Negative for diarrhea and vomiting.   Skin:  Positive for rash.     Objective     /90 (BP Location: Right arm, Patient Position: Sitting, Cuff Size: Standard)   Pulse (!) 107   Temp 98 °F (36.7 °C)   Ht 5' 8.5\" (1.74 m)   Wt 124 kg (274 lb)   SpO2 98%   BMI 41.06 kg/m²     Physical Exam  Physical Exam  Constitutional: well appearing, no acute distress  HEENT: normocephalic, sclera clear, no visible oral or nasal ulcers  Neck: supple, no palpable cervical adenopathy  CV: regular rate and rhythm, no murmur  Pulm: normal respiratory effort, lungs clear to auscultation b/l  Skin: + Raised erythematous rash on the bilateral upper arms and chest  Extremities: warm and well perfused, no edema  MSK: + Synovitis of the right second and third MCPs with small bilateral warm knee effusions    Labs and Imaging  I have personally reviewed pertinent labs and imaging.     "

## 2024-12-31 ENCOUNTER — OFFICE VISIT (OUTPATIENT)
Age: 59
End: 2024-12-31
Payer: COMMERCIAL

## 2024-12-31 VITALS
HEART RATE: 107 BPM | WEIGHT: 274 LBS | OXYGEN SATURATION: 98 % | TEMPERATURE: 98 F | BODY MASS INDEX: 40.58 KG/M2 | HEIGHT: 69 IN | SYSTOLIC BLOOD PRESSURE: 150 MMHG | DIASTOLIC BLOOD PRESSURE: 90 MMHG

## 2024-12-31 DIAGNOSIS — R21 RASH: Primary | ICD-10-CM

## 2024-12-31 DIAGNOSIS — M02.30 REACTIVE ARTHRITIS, UNSPECIFIED SITE (HCC): ICD-10-CM

## 2024-12-31 PROCEDURE — 99214 OFFICE O/P EST MOD 30 MIN: CPT | Performed by: STUDENT IN AN ORGANIZED HEALTH CARE EDUCATION/TRAINING PROGRAM

## 2025-01-01 LAB — A PHAGOCYTOPH DNA BLD QL NAA+PROBE: NEGATIVE

## 2025-01-02 ENCOUNTER — OFFICE VISIT (OUTPATIENT)
Dept: DERMATOLOGY | Facility: CLINIC | Age: 60
End: 2025-01-02
Payer: COMMERCIAL

## 2025-01-02 ENCOUNTER — HOSPITAL ENCOUNTER (OUTPATIENT)
Dept: RADIOLOGY | Facility: HOSPITAL | Age: 60
End: 2025-01-02
Payer: COMMERCIAL

## 2025-01-02 ENCOUNTER — RESULTS FOLLOW-UP (OUTPATIENT)
Age: 60
End: 2025-01-02

## 2025-01-02 VITALS — TEMPERATURE: 97.3 F | WEIGHT: 268 LBS | BODY MASS INDEX: 40.16 KG/M2

## 2025-01-02 DIAGNOSIS — M02.30 REACTIVE ARTHRITIS, UNSPECIFIED SITE (HCC): ICD-10-CM

## 2025-01-02 DIAGNOSIS — M13.80 SERONEGATIVE INFLAMMATORY ARTHRITIS: Primary | ICD-10-CM

## 2025-01-02 DIAGNOSIS — R05.2 SUBACUTE COUGH: ICD-10-CM

## 2025-01-02 DIAGNOSIS — D48.5 NEOPLASM OF UNCERTAIN BEHAVIOR OF SKIN: Primary | ICD-10-CM

## 2025-01-02 DIAGNOSIS — Z85.820 HISTORY OF MELANOMA: ICD-10-CM

## 2025-01-02 LAB — BACTERIA BLD CULT: NORMAL

## 2025-01-02 PROCEDURE — 71046 X-RAY EXAM CHEST 2 VIEWS: CPT

## 2025-01-02 PROCEDURE — 88313 SPECIAL STAINS GROUP 2: CPT | Performed by: STUDENT IN AN ORGANIZED HEALTH CARE EDUCATION/TRAINING PROGRAM

## 2025-01-02 PROCEDURE — 11104 PUNCH BX SKIN SINGLE LESION: CPT | Performed by: DERMATOLOGY

## 2025-01-02 PROCEDURE — 99204 OFFICE O/P NEW MOD 45 MIN: CPT | Performed by: DERMATOLOGY

## 2025-01-02 PROCEDURE — 88341 IMHCHEM/IMCYTCHM EA ADD ANTB: CPT | Performed by: STUDENT IN AN ORGANIZED HEALTH CARE EDUCATION/TRAINING PROGRAM

## 2025-01-02 PROCEDURE — 88305 TISSUE EXAM BY PATHOLOGIST: CPT | Performed by: STUDENT IN AN ORGANIZED HEALTH CARE EDUCATION/TRAINING PROGRAM

## 2025-01-02 PROCEDURE — 88342 IMHCHEM/IMCYTCHM 1ST ANTB: CPT | Performed by: STUDENT IN AN ORGANIZED HEALTH CARE EDUCATION/TRAINING PROGRAM

## 2025-01-02 RX ORDER — TRIAMCINOLONE ACETONIDE 1 MG/G
CREAM TOPICAL
Qty: 45 G | Refills: 3 | Status: SHIPPED | OUTPATIENT
Start: 2025-01-02

## 2025-01-02 NOTE — PATIENT INSTRUCTIONS
"HISTORY OF MELANOMA        Assessment and Plan:  Based on a thorough discussion of this condition and the management approach to it (including a comprehensive discussion of the known risks, side effects and potential benefits of treatment), the patient (family) agrees to implement the following specific plan:     Will continue to monitor for recurrence.  Recommend sunscreen with SPF 30 or higher daily with a wide hat, and sun protective clothing.   Continue following up every 6 months for skin examinations.      What happens at follow-up?  The main purpose of follow-up is to detect recurrences early (metastatic melanoma), but it also offers an opportunity to diagnose a new primary melanoma at the first possible opportunity. A second invasive melanoma occurs in 5-10% of melanoma patients and a new melanoma in situ is diagnosed in more than 20% of melanoma patients.     Our practice makes the following recommendations for follow-up for patients with invasive melanoma.  At-least \"monthly\" self-skin examinations   Routine skin checks by a board certified dermatologist  Follow-up intervals are \"every 3 months\" within 2 years of a new melanoma diagnosis; \"every 6 months\" between 2-4 years of a new melanoma diagnosis; and \"annually\" after 4 years of a new melanoma diagnosis  Individual patient's needs should be considered before an appropriate follow-up is offered  Provide education and support to help the patient adjust to their illness     Follow-up appointments should include:  A check of the scar where the primary melanoma was removed  Checking the regional lymph nodes  A general skin examination  A full physical examination at least annually by your primary care physician     In those with more advanced primary disease, follow-up may include:  Blood tests  Imaging: ultrasound, X-ray, CT, MRI and PET scan.     Most tests are not worthwhile for patients with stage 1 or 2 melanoma unless there are signs or symptoms of " disease recurrence or metastasis. No tests are necessary for healthy patients who have remained well for five years or longer after removal of their melanoma.     What is the outlook for patients with melanoma?  Melanoma in situ is cured by excision because it has no potential to spread around the body.  The risk of spread and ultimate death from invasive melanoma depends on several factors, but the main one is the Breslow thickness of the melanoma at the time it was surgically removed.  Metastases are rare for melanomas < 0.75 mm and the risk for tumours 0.75-1 mm thick is about 5%. The risk steadily increases with thickness so that melanomas > 4 mm have a risk of metastasis of about 40%.     Melanoma is a potentially serious type of skin cancer, in which there is uncontrolled growth of melanocytes (pigment cells). Melanoma is sometimes called malignant melanoma.  Normal melanocytes are found in the basal layer of the epidermis (the outer layer of skin). Melanocytes produce a protein called melanin, which protects skin cells by absorbing ultraviolet (UV) radiation. Melanocytes are found in equal numbers in black and white skin, but melanocytes in black skin produce much more melanin. People with dark brown or black skin are very much less likely to be damaged by UV radiation than those with white skin.           RASH    Assessment and Plan:  Based on a thorough discussion of this condition and the management approach to it (including a comprehensive discussion of the known risks, side effects and potential benefits of treatment), the patient (family) agrees to implement the following specific plan:  Punch bx taken today  Apply Triamcinolone 1 % cream twice daily up to 2 weeks to all areas of concern    Plan:  1. Instructed to keep the wound dry and covered for 24-48h and clean thereafter.  2. Warning signs of infection were reviewed.    3. Recommended that the patient use acetaminophen as needed for pain  4.  Sutures if any should be removed in 14 days      Standard post-procedure care has been explained and has been included in written form within the patient's copy of Informed Consent.

## 2025-01-02 NOTE — PROGRESS NOTES
"[facility] Note     Patient Name: Haroldo Abreu  Encounter Date: 01/2/25     Have you been cared for by a [facility] D[facility]atologist in the last 3 years and, if so, which description applies to you?    Yes.  I have been here within the last 3 years, and my medical history has NOT changed since that time.  I am MALE/not capable of bearing children.    REVIEW OF SYSTEMS:  Have you recently had or currently have any of the following? No changes in my recent health.   PAST MEDICAL HISTORY:  Have you personally ever had or currently have any of the following?  If \"YES,\" then please provide more detail. No changes in my medical history.   HISTORY OF IMMUNOSUPPRESSION: Do you have a history of any of the following:  Systemic Immunosuppression such as Diabetes, Biologic or Immunotherapy, Chemotherapy, Organ Transplantation, Bone Marrow Transplantation or Prednisone?  Yes radiation in 2000 for testicular cancer      Answering \"YES\" requires the addition of the dotphrase \"IMMUNOSUPPRESSED\" as the first diagnosis of the patient's visit.   FAMILY HISTORY:  Any \"first degree relatives\" (parent, brother, sister, or child) with the following?    No changes in my family's known health.   PATIENT EXPERIENCE:    Do you want the Dermatologist to perform a COMPLETE skin exam today including a clinical examination under the \"bra and underwear\" areas?  NO  If necessary, do we have your permission to call and leave a detailed message on your Preferred Phone number that includes your specific medical information?  Yes      No Known Allergies   Current Outpatient Medications:     ketoconazole (NIZORAL) 2 % cream, Apply 2x/day to bottom of feet and in between toes for 3 weeks. Apply on facial redness/rash as needed., Disp: 60 g, Rfl: 0    lisinopril (ZESTRIL) 20 mg tablet, Take 1 tablet (20 mg total) by mouth daily, Disp: 100 tablet, Rfl: 3    Multiple Vitamins-Minerals (MULTIVITAMIN ADULTS PO), Take 1 capsule by mouth " daily, Disp: , Rfl:     predniSONE 10 mg tablet, Take 2 tablets (20 mg total) by mouth daily Until follow-up, Disp: 60 tablet, Rfl: 0    RABEprazole (ACIPHEX) 20 MG tablet, Take 1 tablet (20 mg total) by mouth daily, Disp: 90 tablet, Rfl: 3    tadalafil (CIALIS) 5 MG tablet, Take 1 tablet (5 mg total) by mouth in the morning, Disp: 90 tablet, Rfl: 3    triamcinolone (KENALOG) 0.025 % cream, Apply topically 2 (two) times a day Apply sparingly 2-4 times a day for up to one week., Disp: 30 g, Rfl: 0    meloxicam (MOBIC) 15 mg tablet, Take 1 tablet (15 mg total) by mouth daily (Patient not taking: Reported on 12/31/2024), Disp: 30 tablet, Rfl: 2          Whom besides the patient is providing clinical information about today's encounter?   NO ADDITIONAL HISTORIAN (patient alone provided history)    Physical Exam and Assessment/Plan by Diagnosis:      HISTORY OF MELANOMA     Physical Exam:  Anatomic Location Affected:  Right upper lateral back  Morphological Description of Scar:  well healed scar  Year Treated: 2017             TNM Classification: T1B  Suspected Recurrence: no  Regional adenopathy: no     Additional History of Present Condition:   Patient had a history of melanoma on March 24, 2017.  Patient is being seen every year.  He states he is not aware of any new lesions, but has many moles and freckles.      Assessment and Plan:  Based on a thorough discussion of this condition and the management approach to it (including a comprehensive discussion of the known risks, side effects and potential benefits of treatment), the patient (family) agrees to implement the following specific plan:     Will continue to monitor for recurrence.  Recommend sunscreen with SPF 30 or higher daily with a wide hat, and sun protective clothing.   Continue following up every 6 months for skin examinations.      What happens at follow-up?  The main purpose of follow-up is to detect recurrences early (metastatic melanoma), but it also  "offers an opportunity to diagnose a new primary melanoma at the first possible opportunity. A second invasive melanoma occurs in 5-10% of melanoma patients and a new melanoma in situ is diagnosed in more than 20% of melanoma patients.     Our practice makes the following recommendations for follow-up for patients with invasive melanoma.  At-least \"monthly\" self-skin examinations   Routine skin checks by a board certified dermatologist  Follow-up intervals are \"every 3 months\" within 2 years of a new melanoma diagnosis; \"every 6 months\" between 2-4 years of a new melanoma diagnosis; and \"annually\" after 4 years of a new melanoma diagnosis  Individual patient's needs should be considered before an appropriate follow-up is offered  Provide education and support to help the patient adjust to their illness     Follow-up appointments should include:  A check of the scar where the primary melanoma was removed  Checking the regional lymph nodes  A general skin examination  A full physical examination at least annually by your primary care physician     In those with more advanced primary disease, follow-up may include:  Blood tests  Imaging: ultrasound, X-ray, CT, MRI and PET scan.     Most tests are not worthwhile for patients with stage 1 or 2 melanoma unless there are signs or symptoms of disease recurrence or metastasis. No tests are necessary for healthy patients who have remained well for five years or longer after removal of their melanoma.     What is the outlook for patients with melanoma?  Melanoma in situ is cured by excision because it has no potential to spread around the body.  The risk of spread and ultimate death from invasive melanoma depends on several factors, but the main one is the Breslow thickness of the melanoma at the time it was surgically removed.  Metastases are rare for melanomas < 0.75 mm and the risk for tumours 0.75-1 mm thick is about 5%. The risk steadily increases with thickness so that " melanomas > 4 mm have a risk of metastasis of about 40%.     Melanoma is a potentially serious type of skin cancer, in which there is uncontrolled growth of melanocytes (pigment cells). Melanoma is sometimes called malignant melanoma.  Normal melanocytes are found in the basal layer of the epidermis (the outer layer of skin). Melanocytes produce a protein called melanin, which protects skin cells by absorbing ultraviolet (UV) radiation. Melanocytes are found in equal numbers in black and white skin, but melanocytes in black skin produce much more melanin. People with dark brown or black skin are very much less likely to be damaged by UV radiation than those with white skin.           Neoplasm of uncertain behavior of skin    Physical Exam:  (Anatomic Location); (Size and Morphological Description); (Differential Diagnosis):  Trunk: 4 mm punch of erythematous macular rash; Ddx: rule out lupus vs DM vs contact vs other    Additional History of Present Condition:    Patient was referred to dermatology at the concern of his rheumatologist for an erythematous rash on the chest and upper extremities. Patient has had significant joint pain in his knees and wrists. So far, rheumatologic workup has been negative, negative BANDAR x2. Patient notes that rash started 2 days prior to yaima. He has had this in the past in June where it resolved on its own. Rheumatologist did give patient script for prednisone 20mg daily. Patient had cough and URI symptoms prior to rash onset. Rheum also discussed possibility of a sarcoidosis picture.       Assessment and Plan:  Based on a thorough discussion of this condition and the management approach to it (including a comprehensive discussion of the known risks, side effects and potential benefits of treatment), the patient (family) agrees to implement the following specific plan:  4mm Punch biopsy taken today to rule out lupus vs DM  Apply Triamcinolone 1 % cream twice daily up to 2 weeks  to chest and upper extremities to help with itch and redness  Will follow biopsy results, and notify rheumatologist. I reviewed the note from rheumatology        PROCEDURE NOTE:  PUNCH BIOPSY      Performing Physician:     Anatomic Location; Clinical Description with size (cm); Pre-Op Diagnosis:    ATTENTION:  DERMPATH GROUP    SPECIMEN A; Skin; Anatomic Location: left upper chest; Procedure/Protocol: Skin Specimen (submit in [facility]):Punch Biopsy (when a punch biopsy tool is used; simple closure is included) (CPT 61962; each additional punch biopsy is CPT 07007)   59 y.o. year old  Male with a Morphological Description:4 mm of erymathous macular rash   Differential Diagnosis and/or Specific Clinical Question:  rash  Rule out:  lupus vs DM          Anesthesia: 1% xylocaine with epi       Topical anesthesia: None       Indications: To indicate diagnosis and management plan.    Procedure Details     Patient informed of the risks (including bleeding,scaring and infection) and benefits of the procedure explained. Verbal and written informed consent obtained. The area was prepped and draped in the usual fashion. Anesthesia was obtained with 1% lidocaine with epinephrine. The skin was then stretched perpendicular to the skin tension lines and a punch biopsy to an appropriate sampling depth was obtained with a 4 mm punch with a forceps and iris scissors.     Hemostasis was obtained with 4-0 Prolene x 2 sutures.     Complications:  None      Specimen has been sent for review by Dermatopathology.      Plan:  1. Instructed to keep the wound dry and covered for 24-48h and clean thereafter.  2. Warning signs of infection were reviewed.    3. Recommended that the patient use acetaminophen as needed for pain  4. Sutures if any should be removed in 14 days      Standard post-procedure care has been explained and has been included in written form within the patient's copy of Informed Consent.       Scribe Attestation       I,:  Neli Sands am acting as a scribe while in the presence of the attending physician.:       I,:  Rajesh Holt MD personally performed the services described in this documentation    as scribed in my presence.:             Mai De Jesus MD  Dermatology, PGY-2

## 2025-01-07 PROCEDURE — 88342 IMHCHEM/IMCYTCHM 1ST ANTB: CPT | Performed by: STUDENT IN AN ORGANIZED HEALTH CARE EDUCATION/TRAINING PROGRAM

## 2025-01-07 PROCEDURE — 88305 TISSUE EXAM BY PATHOLOGIST: CPT | Performed by: STUDENT IN AN ORGANIZED HEALTH CARE EDUCATION/TRAINING PROGRAM

## 2025-01-07 PROCEDURE — 88341 IMHCHEM/IMCYTCHM EA ADD ANTB: CPT | Performed by: STUDENT IN AN ORGANIZED HEALTH CARE EDUCATION/TRAINING PROGRAM

## 2025-01-07 PROCEDURE — 88313 SPECIAL STAINS GROUP 2: CPT | Performed by: STUDENT IN AN ORGANIZED HEALTH CARE EDUCATION/TRAINING PROGRAM

## 2025-01-08 ENCOUNTER — RESULTS FOLLOW-UP (OUTPATIENT)
Dept: DERMATOLOGY | Facility: CLINIC | Age: 60
End: 2025-01-08

## 2025-01-08 RX ORDER — PREDNISONE 10 MG/1
TABLET ORAL
Qty: 112 TABLET | Refills: 0 | Status: SHIPPED | OUTPATIENT
Start: 2025-01-08 | End: 2025-02-05 | Stop reason: SDUPTHER

## 2025-01-08 RX ORDER — FOLIC ACID 1 MG/1
1 TABLET ORAL DAILY
Qty: 30 TABLET | Refills: 6 | Status: SHIPPED | OUTPATIENT
Start: 2025-01-08

## 2025-01-08 RX ORDER — METHOTREXATE 2.5 MG/1
15 TABLET ORAL WEEKLY
Qty: 24 TABLET | Refills: 6 | Status: SHIPPED | OUTPATIENT
Start: 2025-01-08 | End: 2025-01-09

## 2025-01-08 NOTE — RESULT ENCOUNTER NOTE
DERMATOPATHOLOGY RESULT NOTE    Results reviewed by ordering physician.  Called patient to personally discuss results. No answer, left voicemail with result.      Instructions for Clinical Derm Team:   (remember to route Result Note to appropriate staff):    None    Result & Plan by Specimen:    Specimen A: benign  Plan: monitor and reassured, benign         Case Report  Surgical Pathology Report                         Case: [id number]                                  Authorizing Provider:  Mai De Jesus MD           Collected:           01/02/2025 1046              Ordering Location:     [facility]      Received:            01/02/2025 1046                                     [place]                                                                      Pathologist:           Ying Villanueva MD                                                          Specimen:    Skin, Other, A; left upper chest                                                        Final Diagnosis  A. Skin, left upper chest, punch biopsy:    Superficial to deep perivascular lymphocytic infiltrate with scattered eosinophils (see note).    Note: In the appropriate clinical context, the histopathologic findings are compatible with hypersensitivity reaction (e.g., to a drug, arthropod assault, other). Clinical pathologic correlation is advised. Pathogenic microorganisms are not seen with PAS stain. Multiple levels examined. Colloidal iron stain was reviewed. CD3, CD20, , , and CD68 immunostains were reviewed; findings diagnostic of hematologic malignancy or a lymphoproliferative disorder are not appreciated. Significant lymphocytic cytologic atypia is not seen. Spirochete immunostain is pending. If the rash were to progress/persist despite therapy, additional sampling should be sought to exclude development of a more significant disease.      Electronically signed by Ying Villanueva MD on 1/7/2025 at 1656 EST  Additional  "Information   All reported additional testing was performed with appropriately reactive controls.  These tests were developed and their performance characteristics determined by [facility] or appropriate performing facility, though some tests may be performed on tissues which have not been validated for performance characteristics (such as staining performed on alcohol exposed cell blocks and decalcified tissues).  Results should be interpreted with caution and in the context of the patients' clinical condition. These tests may not be cleared or approved by the U.S. Food and Drug Administration, though the FDA has determined that such clearance or approval is not necessary. These tests are used for clinical purposes and they should not be regarded as investigational or for research. This laboratory has been approved by [place] 88, designated as a high-complexity laboratory and is qualified to perform these tests.  .  Gross Description   A. The specimen is received in formalin, labeled with the patient's name and hospital number, and is designated \" left upper chest\".  The specimen consists of a punch biopsy of pale-white skin measuring 0.5 x 0.4 cm with an underlying soft tissue to a depth of 0.8 cm.  The epithelial surfaces exhibits a tan pigmented macule measuring 0.2 x 0.2 cm.  The skin surface is inked red and the margin of resection is inked green.  The specimen is bisected.  Entirely submitted. One cassette.  Between sponges    Note: The estimated total formalin fixation time based upon information provided by the submitting clinician and the standard processing schedule is under 72 hours.      Jeff  Clinical Information         "

## 2025-01-09 RX ORDER — METHOTREXATE 25 MG/ML
15 INJECTION, SOLUTION INTRAMUSCULAR; INTRATHECAL; INTRAVENOUS; SUBCUTANEOUS
Qty: 4 ML | Refills: 6 | Status: SHIPPED | OUTPATIENT
Start: 2025-01-09 | End: 2025-02-05 | Stop reason: SDUPTHER

## 2025-01-09 RX ORDER — SYRINGE WITH NEEDLE, 1 ML 28GX1/2"
SYRINGE, EMPTY DISPOSABLE MISCELLANEOUS
Qty: 10 EACH | Refills: 6 | Status: SHIPPED | OUTPATIENT
Start: 2025-01-09

## 2025-01-16 ENCOUNTER — CLINICAL SUPPORT (OUTPATIENT)
Dept: DERMATOLOGY | Facility: CLINIC | Age: 60
End: 2025-01-16

## 2025-01-16 ENCOUNTER — NURSE TRIAGE (OUTPATIENT)
Age: 60
End: 2025-01-16

## 2025-01-16 DIAGNOSIS — Z48.02 ENCOUNTER FOR REMOVAL OF SUTURES: Primary | ICD-10-CM

## 2025-01-16 PROCEDURE — RECHECK

## 2025-01-16 NOTE — PROGRESS NOTES
"Suture removal    Date/Time: 1/16/2025 9:45 AM    Performed by: Marlee Silva RN  Authorized by: Josi Kruger MD  Universal Protocol:  procedure performed by consultantConsent: Verbal consent obtained. Written consent not obtained.  Risks and benefits: risks, benefits and alternatives were discussed  Consent given by: patient  Time out: Immediately prior to procedure a \"time out\" was called to verify the correct patient, procedure, equipment, support staff and site/side marked as required.  Timeout called at: 1/16/2025 9:33 AM.  Patient understanding: patient states understanding of the procedure being performed  Patient consent: the patient's understanding of the procedure matches consent given  Procedure consent: procedure consent matches procedure scheduled  Relevant documents: relevant documents not present or verified  Test results: test results not available  Site marked: the operative site was not marked  Radiology Images displayed and confirmed. If images not available, report reviewed: imaging studies not available  Patient identity confirmed: verbally with patient      Patient location:  Clinic  Location:     Laterality:  Left    Location:  Trunk    Trunk location:  Chest (upper)  Procedure details:     Tools used:  Suture removal kit    Wound appearance:  No sign(s) of infection, good wound healing, clean, moist and pink    Number of sutures removed:  2  Post-procedure details:     Post-removal:  Band-Aid applied (Vaseline applied)    Patient tolerance of procedure:  Tolerated well, no immediate complications      Before suture removal     After suture removal   "

## 2025-01-16 NOTE — TELEPHONE ENCOUNTER
"Patient call:  Pt stated provider: Dr. Kimball - scheduled with SETH Rice    Actionable item: Appointment with pulm    Chief complaint:     Returning call to triage. Initially messaged Dr. Kimball with Rheumatology.   Reports sore throat which onset several weeks ago which progressed to a cough. White mucous productive + GUADALUPE has developed. Denies fever or other symptoms.     Mentioned last occurrence to have presented to ER with PNA.       Dispo: Due to hx and currently unexplained symptoms, scheduled for urgent follow up with pulm. Advised, Jose to call [facility] with worsening symptoms.  Agrees with plan.   All questions answered.       Reason for Disposition   Cough has been present for > 3 weeks    Answer Assessment - Initial Assessment Questions  1. ONSET: \"When did the cough begin?\"       Several weeks ago  2. SEVERITY: \"How bad is the cough today?\"       persistent  3. SPUTUM: \"Describe the color of your sputum\" (e.g., none, dry cough; clear, white, yellow, green)      white  4. HEMOPTYSIS: \"Are you coughing up any blood?\" If Yes, ask: \"How much?\" (e.g., flecks, streaks, tablespoons, etc.)      denies  5. DIFFICULTY BREATHING: \"Are you having difficulty breathing?\" If Yes, ask: \"How bad is it?\" (e.g., mild, moderate, severe)       GUADALUPE  6. FEVER: \"Do you have a fever?\" If Yes, ask: \"What is your temperature, how was it measured, and when did it start?\"      denies  7. CARDIAC HISTORY: \"Do you have any history of heart disease?\" (e.g., heart attack, congestive heart failure)       Please see chart  8. LUNG HISTORY: \"Do you have any history of lung disease?\"  (e.g., pulmonary embolus, asthma, emphysema)      Please see chart  9. PE RISK FACTORS: \"Do you have a history of blood clots?\" (or: recent major surgery, recent prolonged travel, bedridden)      -  10. OTHER SYMPTOMS: \"Do you have any other symptoms?\" (e.g., runny nose, wheezing, chest pain)        Please see note above    Protocols used: Cough-Adult-[place]    "

## 2025-01-19 DIAGNOSIS — M02.30 REACTIVE ARTHRITIS, UNSPECIFIED SITE (HCC): ICD-10-CM

## 2025-01-20 ENCOUNTER — OFFICE VISIT (OUTPATIENT)
Age: 60
End: 2025-01-20
Payer: COMMERCIAL

## 2025-01-20 VITALS
BODY MASS INDEX: 41.06 KG/M2 | HEIGHT: 69 IN | TEMPERATURE: 98.4 F | DIASTOLIC BLOOD PRESSURE: 82 MMHG | HEART RATE: 85 BPM | SYSTOLIC BLOOD PRESSURE: 156 MMHG | WEIGHT: 277.2 LBS | OXYGEN SATURATION: 95 %

## 2025-01-20 DIAGNOSIS — R05.2 SUBACUTE COUGH: Primary | ICD-10-CM

## 2025-01-20 PROCEDURE — 99213 OFFICE O/P EST LOW 20 MIN: CPT | Performed by: NURSE PRACTITIONER

## 2025-01-20 RX ORDER — MELOXICAM 15 MG/1
15 TABLET ORAL DAILY
Qty: 30 TABLET | Refills: 5 | Status: SHIPPED | OUTPATIENT
Start: 2025-01-20

## 2025-01-20 RX ORDER — BENZONATATE 200 MG/1
200 CAPSULE ORAL 3 TIMES DAILY PRN
Qty: 30 CAPSULE | Refills: 0 | Status: SHIPPED | OUTPATIENT
Start: 2025-01-20

## 2025-01-20 NOTE — ASSESSMENT & PLAN NOTE
On exam today his chest is clear and he has improved on prescribed oral steroids. I did review recent CXR as well as CT chest dated 7/19/24 which did not demonstrate concern for ILD but did show mild GGO right lower lobe, and he received treatment for pneumonia from the ER. Discussed with Jose I think this is a subacute cough following recent illness - wife and grandkids were sick. Not clear to me the relationship to skin rash and he is following derm/rheum for that issue. We will continue to follow for now.  Will obtain PFT to establish baseline  Sputum culture - deferred given lack of acute symptoms  Complete prednisone as directed  Phill Mims  Call with worsening symptoms

## 2025-01-20 NOTE — PROGRESS NOTES
Pulmonary Follow-Up Note   Haroldo Abreu 59 y.o. male MRN: [id number]  1/20/2025      Assessment/Plan:    Problem List Items Addressed This Visit       Subacute cough - Primary    On exam today his chest is clear and he has improved on prescribed oral steroids. I did review recent CXR as well as CT chest dated 7/19/24 which did not demonstrate concern for ILD but did show mild GGO right lower lobe, and he received treatment for pneumonia from the ER. Discussed with Jose I think this is a subacute cough following recent illness - wife and grandkids were sick. Not clear to me the relationship to skin rash and he is following derm/rheum for that issue. We will continue to follow for now.  Will obtain PFT to establish baseline  Sputum culture - deferred given lack of acute symptoms  Complete prednisone as directed  Phill Mims  Call with worsening symptoms         Relevant Medications    benzonatate (TESSALON) 200 MG capsule    Other Relevant Orders    Complete PFT with post bronchodilator     Vaccines: up to date    Return in about 3 months (around 4/20/2025).    All of Jose's questions were answered prior to leaving the office today.  He is aware to call our office with any further questions or concerns.    History of Present Illness   Reason for Visit: Follow up  Chief Complaint: cough x3 weeks  HPI: Haroldo Abreu is a 59 y.o. male with PMHx testicular ca, melanoma, GERD, HTN, hearing loss who presents to the office today reporting cough, shortness of breath, sore throat for the past 3 weeks. Also has inflammatory arthritis and skin rash at the same time. He has not been seen since May 2023. In the past he developed pneumonia and pericardial effusion with similar symptoms.    Given the similarity of symptoms - and presence of positive inflammatory markers on recent lab studies - he was seen at rheumatology who prescribed prednisone. Rheumatology workup has been negative. He was also referred to Derm for biopsy  of the concurrent skin rash which revealed deep perivascular lymphocytic infiltrate with scattered eosinophils, consistent with hypersensitivity reaction.    He is still coughing despite prednisone taper, productive for occasional white sputum. He has no SOB or wheezing. No tight chest. Tried OTC remedies such as Mucinex and Robitussin - no clear improvement.    Review of Systems  Please note that a 14-point review of systems was performed to include Constitutional, HEENT, Respiratory, CVS, GI, , Musculoskeletal, Integumentary, Neurologic, Rheumatologic, Endocrinologic, Psychiatric, Lymphatic, and Hematologic/Oncologic systems were reviewed and are negative unless otherwise stated in HPI. Positive and negative findings pertinent to this evaluation are incorporated into the history of present illness.       Historical Information   Past Medical History:   Diagnosis Date    Cancer (HCC) 2018    Colon polyp     COVID-19 12/2020    GERD (gastroesophageal reflux disease)     HL (hearing loss) 1974    Tumor    Melanoma ([facility]) 2017    Dermatology and Moh's surgery in [place]. Afua; Right lateral upper back    Pneumonia 2/21/2023    I have had a cough since early Deecember    Testicular cancer (HCC) 2000    Dr monterroso and yony (urology)     Past Surgical History:   Procedure Laterality Date    COLONOSCOPY      January 2021 small cecal serrated adenoma, sigmoid diverticulosis, internal hemorrhoids    ORCHIECTOMY Right     SKIN CANCER EXCISION      TUMOR EXCISION      right ear as a child    UPPER GASTROINTESTINAL ENDOSCOPY      January 2021: Schatzki ring biopsies negative for Johnson's or EOE, fundic gland polyps, mild gastritis negative for H pylori.   September 2014 Schatzki ring biopsies negative for Johnson's EOE or H pylori.     Family History   Problem Relation Age of Onset    Diabetes Mother     Hypertension Mother     Hypertension Father     Heart disease Father     Heart failure Father          Congestive    No Known Problems Sister     No Known Problems Brother     No Known Problems Brother     Colon polyps Neg Hx     Colon cancer Neg Hx      Social History   Social History     Substance and Sexual Activity   Alcohol Use Yes    Alcohol/week: 1.0 standard drink of alcohol    Types: 1 Cans of beer per week    Comment: socially     Social History     Substance and Sexual Activity   Drug Use Never     Social History     Tobacco Use   Smoking Status Never    Passive exposure: Never   Smokeless Tobacco Never     E-Cigarette/Vaping    E-Cigarette Use Never User      E-Cigarette/Vaping Substances    Nicotine No     THC No     CBD No     Flavoring No     Other No     Unknown No        Meds/Allergies     Current Outpatient Medications:     benzonatate (TESSALON) 200 MG capsule, Take 1 capsule (200 mg total) by mouth 3 (three) times a day as needed for cough, Disp: 30 capsule, Rfl: 0    folic acid (KP Folic Acid) 1 mg tablet, Take 1 tablet (1 mg total) by mouth daily, Disp: 30 tablet, Rfl: 6    ketoconazole (NIZORAL) 2 % cream, Apply 2x/day to bottom of feet and in between toes for 3 weeks. Apply on facial redness/rash as needed., Disp: 60 g, Rfl: 0    lisinopril (ZESTRIL) 20 mg tablet, Take 1 tablet (20 mg total) by mouth daily, Disp: 100 tablet, Rfl: 3    methotrexate 50 MG/2ML injection, Inject 0.6 mL (15 mg total) under the skin every 7 days, Disp: 4 mL, Rfl: 6    Multiple Vitamins-Minerals (MULTIVITAMIN ADULTS PO), Take 1 capsule by mouth daily, Disp: , Rfl:     predniSONE 10 mg tablet, Take 4 tablets (40 mg total) by mouth daily for 7 days, THEN 3 tablets (30 mg total) daily for 14 days, THEN 2 tablets (20 mg total) daily for 14 days, THEN 1 tablet (10 mg total) daily for 14 days., Disp: 112 tablet, Rfl: 0    RABEprazole (ACIPHEX) 20 MG tablet, Take 1 tablet (20 mg total) by mouth daily, Disp: 90 tablet, Rfl: 3    tadalafil (CIALIS) 5 MG tablet, Take 1 tablet (5 mg total) by mouth in the morning, Disp: 90  "tablet, Rfl: 3    triamcinolone (KENALOG) 0.025 % cream, Apply topically 2 (two) times a day Apply sparingly 2-4 times a day for up to one week., Disp: 30 g, Rfl: 0    triamcinolone (KENALOG) 0.1 % cream, Apply twice daily for up to 2 weeks to all areas of concern, Disp: 45 g, Rfl: 3    Tuberculin-Allergy Syringes (B-D ALLERGY SYRINGE 1CC/28G) 28G X 1/2\" 1 ML MISC, Use every 7 days Use to admin SQ MTX weekly, Disp: 10 each, Rfl: 6    meloxicam (MOBIC) 15 mg tablet, TAKE 1 TABLET (15 MG TOTAL) BY MOUTH DAILY. (Patient not taking: Reported on 1/20/2025), Disp: 30 tablet, Rfl: 5  No Known Allergies    Vitals: Blood pressure 156/82, pulse 85, temperature 98.4 °F (36.9 °C), temperature source Tympanic, height 5' 8.5\" (1.74 m), weight 126 kg (277 lb 3.2 oz), SpO2 95%. Body mass index is 41.54 kg/m². Oxygen Therapy  SpO2: 95 %  Oxygen Therapy: None (Room air)      Physical Exam  Vitals reviewed.   Constitutional:       Appearance: Normal appearance.   HENT:      Head: Normocephalic.      Nose: Nose normal.      Mouth/Throat:      Mouth: Mucous membranes are moist.      Pharynx: Oropharynx is clear.   Cardiovascular:      Rate and Rhythm: Normal rate and regular rhythm.      Pulses: Normal pulses.      Heart sounds: Normal heart sounds.   Pulmonary:      Effort: Pulmonary effort is normal.      Breath sounds: Normal breath sounds.   Musculoskeletal:         General: Normal range of motion.   Skin:     General: Skin is warm.      Capillary Refill: Capillary refill takes less than 2 seconds.   Neurological:      General: No focal deficit present.      Mental Status: He is alert and oriented to person, place, and time.   Psychiatric:         Mood and Affect: Mood normal.         Behavior: Behavior normal.         Labs:   Lab Results   Component Value Date    WBC 9.44 12/27/2024    HGB 12.6 12/27/2024    HCT 38.7 12/27/2024    MCV 85 12/27/2024     12/27/2024    EOSPCT 2 12/27/2024    [facility]ABS 0.15 12/27/2024    [facility]     Lab Results   Component Value Date    CALCIUM 8.3 (L) 12/28/2024    K 4.2 12/28/2024    CO2 26 12/28/2024     12/28/2024    BUN 18 12/28/2024    CREATININE 0.81 12/28/2024     No results found for: \"IGE\", \"RAST\"  Lab Results   Component Value Date    ALT 15 12/28/2024    AST 16 12/28/2024    ALKPHOS 61 12/28/2024     Component      Latest Ref Rng 12/27/2024   C-REACTIVE PROTEIN      <3.0 mg/L 106.9 (H)    Sed Rate      0 - 19 mm/hour 55 (H)       Legend:  (H) High      Imaging and other studies: Results Review Statement: I reviewed radiology reports from this admission including: chest xray and CT chest.  CXR 1/2/25  No acute cardiopulmonary disease.     CT chest 7/19/24  Mild paraspinal ground glass opacity in the right lower lobe, infectious or inflammatory.     Mild diffuse bronchial wall thickening suggestive of bronchiolitis.     Mild right axillary lymphadenopathy with nodes normal in size but increased in number, likely reactive.     Mild coronary artery calcification indicating atherosclerotic heart disease.     Stable 1.7 cm right thyroid nodule. Recommend further evaluation with ultrasound.     Stable 4.1 cm aortic root.      STEH Ortiz  [facility]        Portions of the record may have been created with voice recognition software.  Occasional wrong word or \"sound a like\" substitutions may have occurred due to the inherent limitations of voice recognition software.  Read the chart carefully and recognize, using context, where substitutions have occurred or contact the dictating provider.  "

## 2025-02-05 ENCOUNTER — RESULTS FOLLOW-UP (OUTPATIENT)
Age: 60
End: 2025-02-05

## 2025-02-05 ENCOUNTER — APPOINTMENT (OUTPATIENT)
Dept: LAB | Facility: HOSPITAL | Age: 60
End: 2025-02-05
Payer: COMMERCIAL

## 2025-02-05 DIAGNOSIS — M13.80 SERONEGATIVE INFLAMMATORY ARTHRITIS: ICD-10-CM

## 2025-02-05 LAB
ALBUMIN SERPL BCG-MCNC: 3.8 G/DL (ref 3.5–5)
ALP SERPL-CCNC: 42 U/L (ref 34–104)
ALT SERPL W P-5'-P-CCNC: 14 U/L (ref 7–52)
ANION GAP SERPL CALCULATED.3IONS-SCNC: 7 MMOL/L (ref 4–13)
AST SERPL W P-5'-P-CCNC: 13 U/L (ref 13–39)
BASOPHILS # BLD AUTO: 0.05 THOUSANDS/ΜL (ref 0–0.1)
BASOPHILS NFR BLD AUTO: 1 % (ref 0–1)
BILIRUB SERPL-MCNC: 0.52 MG/DL (ref 0.2–1)
BUN SERPL-MCNC: 19 MG/DL (ref 5–25)
CALCIUM SERPL-MCNC: 8.6 MG/DL (ref 8.4–10.2)
CHLORIDE SERPL-SCNC: 104 MMOL/L (ref 96–108)
CO2 SERPL-SCNC: 29 MMOL/L (ref 21–32)
CREAT SERPL-MCNC: 0.91 MG/DL (ref 0.6–1.3)
EOSINOPHIL # BLD AUTO: 0.15 THOUSAND/ΜL (ref 0–0.61)
EOSINOPHIL NFR BLD AUTO: 2 % (ref 0–6)
ERYTHROCYTE [DISTWIDTH] IN BLOOD BY AUTOMATED COUNT: 15.9 % (ref 11.6–15.1)
GFR SERPL CREATININE-BSD FRML MDRD: 91 ML/MIN/1.73SQ M
GLUCOSE P FAST SERPL-MCNC: 80 MG/DL (ref 65–99)
HCT VFR BLD AUTO: 38 % (ref 36.5–49.3)
HGB BLD-MCNC: 12.3 G/DL (ref 12–17)
IMM GRANULOCYTES # BLD AUTO: 0.05 THOUSAND/UL (ref 0–0.2)
IMM GRANULOCYTES NFR BLD AUTO: 1 % (ref 0–2)
LYMPHOCYTES # BLD AUTO: 2.08 THOUSANDS/ΜL (ref 0.6–4.47)
LYMPHOCYTES NFR BLD AUTO: 24 % (ref 14–44)
MCH RBC QN AUTO: 28.4 PG (ref 26.8–34.3)
MCHC RBC AUTO-ENTMCNC: 32.4 G/DL (ref 31.4–37.4)
MCV RBC AUTO: 88 FL (ref 82–98)
MONOCYTES # BLD AUTO: 0.92 THOUSAND/ΜL (ref 0.17–1.22)
MONOCYTES NFR BLD AUTO: 11 % (ref 4–12)
NEUTROPHILS # BLD AUTO: 5.53 THOUSANDS/ΜL (ref 1.85–7.62)
NEUTS SEG NFR BLD AUTO: 61 % (ref 43–75)
NRBC BLD AUTO-RTO: 0 /100 WBCS
PLATELET # BLD AUTO: 216 THOUSANDS/UL (ref 149–390)
PMV BLD AUTO: 10.3 FL (ref 8.9–12.7)
POTASSIUM SERPL-SCNC: 4.3 MMOL/L (ref 3.5–5.3)
PROT SERPL-MCNC: 6.6 G/DL (ref 6.4–8.4)
RBC # BLD AUTO: 4.33 MILLION/UL (ref 3.88–5.62)
SODIUM SERPL-SCNC: 140 MMOL/L (ref 135–147)
TRIGL SERPL-MCNC: 132 MG/DL (ref ?–150)
WBC # BLD AUTO: 8.78 THOUSAND/UL (ref 4.31–10.16)

## 2025-02-05 PROCEDURE — 36415 COLL VENOUS BLD VENIPUNCTURE: CPT

## 2025-02-05 PROCEDURE — 85025 COMPLETE CBC W/AUTO DIFF WBC: CPT

## 2025-02-05 RX ORDER — PREDNISONE 10 MG/1
TABLET ORAL
Qty: 50 TABLET | Refills: 0 | Status: SHIPPED | OUTPATIENT
Start: 2025-02-05 | End: 2025-03-17

## 2025-02-05 RX ORDER — METHOTREXATE 25 MG/ML
20 INJECTION, SOLUTION INTRAMUSCULAR; INTRATHECAL; INTRAVENOUS; SUBCUTANEOUS
Qty: 4 ML | Refills: 6 | Status: SHIPPED | OUTPATIENT
Start: 2025-02-05 | End: 2025-05-06

## 2025-02-19 PROBLEM — R05.2 SUBACUTE COUGH: Status: RESOLVED | Noted: 2025-01-20 | Resolved: 2025-02-19

## 2025-03-03 DIAGNOSIS — N40.1 BENIGN PROSTATIC HYPERPLASIA WITH POST-VOID DRIBBLING: ICD-10-CM

## 2025-03-03 DIAGNOSIS — N39.43 BENIGN PROSTATIC HYPERPLASIA WITH POST-VOID DRIBBLING: ICD-10-CM

## 2025-03-03 DIAGNOSIS — K21.9 GASTROESOPHAGEAL REFLUX DISEASE, UNSPECIFIED WHETHER ESOPHAGITIS PRESENT: ICD-10-CM

## 2025-03-03 DIAGNOSIS — L21.9 SEBORRHEIC DERMATITIS: ICD-10-CM

## 2025-03-03 DIAGNOSIS — I10 ESSENTIAL HYPERTENSION: ICD-10-CM

## 2025-03-04 RX ORDER — TADALAFIL 5 MG/1
5 TABLET ORAL DAILY
Qty: 90 TABLET | Refills: 0 | Status: SHIPPED | OUTPATIENT
Start: 2025-03-04

## 2025-03-04 RX ORDER — LISINOPRIL 20 MG/1
20 TABLET ORAL DAILY
Qty: 100 TABLET | Refills: 1 | Status: SHIPPED | OUTPATIENT
Start: 2025-03-04

## 2025-03-04 RX ORDER — KETOCONAZOLE 20 MG/G
CREAM TOPICAL
Qty: 60 G | Refills: 0 | Status: SHIPPED | OUTPATIENT
Start: 2025-03-04

## 2025-03-04 NOTE — TELEPHONE ENCOUNTER
Patient last seen by eddy on 1/2/2025. Has a follow up in May 2025. Requesting refill of Ketoconazole cream

## 2025-03-05 RX ORDER — RABEPRAZOLE SODIUM 20 MG/1
20 TABLET, DELAYED RELEASE ORAL DAILY
Qty: 90 TABLET | Refills: 1 | Status: SHIPPED | OUTPATIENT
Start: 2025-03-05

## 2025-03-10 NOTE — PROGRESS NOTES
"Rheumatology Follow-up Visit  Name: Haroldo Abreu      : 1965      MRN: [id number]  Encounter Provider: Kiera Kimball MD  Encounter Date: 3/17/2025   Encounter department: [facility] [address] [place] [address]    Assessment & Plan  Seronegative inflammatory arthritis  59-year-old male who presents for follow-up.  Patient has been doing significantly better on methotrexate and has not tapered off of steroids.  Recommend to continue with methotrexate 20 mg weekly and daily folic acid.  Continue lab monitoring every 3 months.  If he has recurrence of the inflammatory Tritus or rash, we will plan to increase methotrexate to 25 mg weekly but otherwise plan to keep current dose.  Patient will follow-up in 3 months.  Orders:    methotrexate 50 MG/2ML injection; Inject 0.8 mL (20 mg total) under the skin every 7 days    folic acid ( Folic Acid) 1 mg tablet; Take 1 tablet (1 mg total) by mouth daily    Tuberculin-Allergy Syringes (B-D ALLERGY SYRINGE 1CC/28G) 28G X 1/2\" 1 ML MISC; Use every 7 days Use to admin SQ MTX weekly    High risk medication use         Long-term use of immunosuppressant medication           History of Present Illness     Haroldo Abreu is a 59 y.o. male who presents for follow-up suspected reactive arthritis.     Interval History:  Patient reports overall he has been doing very well.  He reports he stopped steroids about 10 days ago and has not had any recurrence of symptoms.  His joints have been doing very well.  Some mild knee pain related to his OA.  He has not noticed the rash recur.  No sore throat fever cough.  No issues with the methotrexate.    Permanent History:  Presented 2024 with abrupt onset of severe inflammatory arthritis primarily affecting the knees, ankles, and wrists/hands which was associated with a migratory rash and sore throat.  Prior to the onset of symptoms he had received a vaccination as well as had exposure to COVID.  Extensive workup significant for " "very elevated inflammatory markers, but otherwise unremarkable. Leading differential initially was for reactive arthritis (HLA-B27 negative). Patient resumed on Mobic at initial visit which improved his symptoms. Inflammatory markers improved. Xray of knees with bilateral medial compartment OA.  He had a second recurrence of his initial presenting symptoms in December 2024.  He underwent biopsy of the rash which showed superficial to deep perivascular lymphocytic infiltrate with scattered eosinophils.  He was started on SQ methotrexate.    Complete ROS conducted as per HPI. In addition, denies:  Fever  Photosensitive rash  Sicca symptoms  Recurrent oral ulcers  Muscle weakness  Uveitis  Dactylitis  Chest pain  SOB  Pleurisy  Gross hematuria  Foamy urine  Raynaud's  Joint issues other than noted above      Objective     /72 (BP Location: Right arm, Patient Position: Sitting, Cuff Size: Adult)   Pulse 78   Temp (!) 97.2 °F (36.2 °C) (Tympanic)   Ht 5' 8.5\" (1.74 m)   Wt 128 kg (282 lb)   SpO2 98%   BMI 42.25 kg/m²     Physical Exam  Physical Exam  Constitutional: well appearing, no acute distress  HEENT: normocephalic, sclera clear, no visible oral or nasal ulcers  Neck: supple, no palpable cervical adenopathy  CV: regular rate and rhythm, no murmur  Pulm: normal respiratory effort, lungs clear to auscultation b/l  Skin: No appreciable rash  Extremities: warm and well perfused, no edema  MSK: Small cool right knee effusion but otherwise no synovitis    Labs and Imaging  I have personally reviewed pertinent labs and imaging.     "

## 2025-03-17 ENCOUNTER — OFFICE VISIT (OUTPATIENT)
Age: 60
End: 2025-03-17
Payer: COMMERCIAL

## 2025-03-17 VITALS
OXYGEN SATURATION: 98 % | HEART RATE: 78 BPM | WEIGHT: 282 LBS | TEMPERATURE: 97.2 F | BODY MASS INDEX: 41.77 KG/M2 | SYSTOLIC BLOOD PRESSURE: 136 MMHG | DIASTOLIC BLOOD PRESSURE: 72 MMHG | HEIGHT: 69 IN

## 2025-03-17 DIAGNOSIS — Z79.60 LONG-TERM USE OF IMMUNOSUPPRESSANT MEDICATION: ICD-10-CM

## 2025-03-17 DIAGNOSIS — Z79.899 HIGH RISK MEDICATION USE: ICD-10-CM

## 2025-03-17 DIAGNOSIS — M13.80 SERONEGATIVE INFLAMMATORY ARTHRITIS: Primary | ICD-10-CM

## 2025-03-17 PROCEDURE — 99214 OFFICE O/P EST MOD 30 MIN: CPT | Performed by: STUDENT IN AN ORGANIZED HEALTH CARE EDUCATION/TRAINING PROGRAM

## 2025-03-17 RX ORDER — SYRINGE WITH NEEDLE, 1 ML 28GX1/2"
SYRINGE, EMPTY DISPOSABLE MISCELLANEOUS
Qty: 10 EACH | Refills: 6 | Status: SHIPPED | OUTPATIENT
Start: 2025-03-17

## 2025-03-17 RX ORDER — METHOTREXATE 25 MG/ML
20 INJECTION, SOLUTION INTRAMUSCULAR; INTRATHECAL; INTRAVENOUS; SUBCUTANEOUS
Qty: 4 ML | Refills: 6 | Status: SHIPPED | OUTPATIENT
Start: 2025-03-17 | End: 2025-06-15

## 2025-03-17 RX ORDER — NEEDLES, SAFETY 22GX1 1/2"
NEEDLE, DISPOSABLE MISCELLANEOUS
COMMUNITY
Start: 2025-01-10

## 2025-03-17 RX ORDER — FOLIC ACID 1 MG/1
1 TABLET ORAL DAILY
Qty: 30 TABLET | Refills: 6 | Status: SHIPPED | OUTPATIENT
Start: 2025-03-17

## 2025-04-28 ENCOUNTER — OFFICE VISIT (OUTPATIENT)
Age: 60
End: 2025-04-28
Payer: COMMERCIAL

## 2025-04-28 VITALS
BODY MASS INDEX: 40.88 KG/M2 | HEART RATE: 86 BPM | DIASTOLIC BLOOD PRESSURE: 74 MMHG | WEIGHT: 276 LBS | SYSTOLIC BLOOD PRESSURE: 134 MMHG | HEIGHT: 69 IN | TEMPERATURE: 97.7 F | OXYGEN SATURATION: 97 %

## 2025-04-28 DIAGNOSIS — R05.2 SUBACUTE COUGH: Primary | ICD-10-CM

## 2025-04-28 DIAGNOSIS — R29.818 SUSPECTED SLEEP APNEA: ICD-10-CM

## 2025-04-28 DIAGNOSIS — R06.09 DOE (DYSPNEA ON EXERTION): ICD-10-CM

## 2025-04-28 DIAGNOSIS — K21.9 GASTROESOPHAGEAL REFLUX DISEASE, UNSPECIFIED WHETHER ESOPHAGITIS PRESENT: ICD-10-CM

## 2025-04-28 PROCEDURE — 99213 OFFICE O/P EST LOW 20 MIN: CPT | Performed by: NURSE PRACTITIONER

## 2025-04-28 NOTE — ASSESSMENT & PLAN NOTE
Samm currently 5, stop-BANG currently 5. Will reevaluate at next visit and discuss in more detail.

## 2025-04-28 NOTE — PROGRESS NOTES
Follow-up  Visit - Pulmonary Medicine   Name: Haroldo Abreu      : 1965      MRN: [id number]  Encounter Provider: SETH Ortiz  Encounter Date: 2025   Encounter department: [facility]  :  Assessment & Plan  Subacute cough  Cough is resolved with no further issue.  He is following with Rheumatology for seronegative inflammatory arthritis and on methotrexate weekly         Gastroesophageal reflux disease, unspecified whether esophagitis present  Symptoms are controlled on daily PPI         GUADALUPE (dyspnea on exertion)  No clear trigger and I suspect maybe has some chronic level of shortness of breath. This may be multifactorial - deconditioning, obesity. At this time his Benton Sleepiness score is 5 (low risk for sleep apnea); Stop-BANG score is 5 (high risk) and he has no report of apneas. We will discuss at next visit.    I have asked him to schedule the PFT ordered prior.    Not clear to me why recurrent illness tends to leave him with lingering symptoms such as the prior subacute cough and the current shortness of breath.         Suspected sleep apnea  Benton currently 5, stop-BANG currently 5. Will reevaluate at next visit and discuss in more detail.            Return in about 6 months (around 10/28/2025).    History of Present Illness   Haroldo Abreu is a 59 y.o. male with PMHx testicular ca, melanoma, GERD, HTN, hearing loss. He has a prior history of subacute cough since early 2025 and returns for review of recent test results.    He had a stomach illness last week and since then he feels short of breath and low energy. This is improved over the weekend although he does feel short of breath in the AM only. He feels these symptoms recur every time he gets sick with something. Cough is completely resolved. He gets short of breath showering for example, and becomes aware of his heart racing. He has no sleep disturbance related to his breathing. Feels  "well rested during the day but does get tired at times.    He has given a score of 5 on Platinum Sleepiness Scale today. No report of apneas or sleep disturbance related to breathing. Does awaken during the night to urinate.    Review of Systems   Constitutional:  Negative for appetite change and fever.   HENT:  Positive for sore throat. Negative for ear pain, postnasal drip, rhinorrhea, sneezing and trouble swallowing.    Respiratory:  Positive for shortness of breath.    Cardiovascular:  Negative for chest pain.   Neurological:  Negative for headaches.   All other systems reviewed and are negative.        Current Outpatient Medications on File Prior to Visit   Medication Sig Dispense Refill    folic acid ( Folic Acid) 1 mg tablet Take 1 tablet (1 mg total) by mouth daily 30 tablet 6    ketoconazole (NIZORAL) 2 % cream Apply 2x/day to bottom of feet and in between toes for 3 weeks. Apply on facial redness/rash as needed. 60 g 0    lisinopril (ZESTRIL) 20 mg tablet Take 1 tablet (20 mg total) by mouth daily 100 tablet 1    methotrexate 50 MG/2ML injection Inject 0.8 mL (20 mg total) under the skin every 7 days 4 mL 6    Multiple Vitamins-Minerals (MULTIVITAMIN ADULTS PO) Take 1 capsule by mouth daily      RABEprazole (ACIPHEX) 20 MG tablet Take 1 tablet (20 mg total) by mouth daily 90 tablet 1    tadalafil (CIALIS) 5 MG tablet Take 1 tablet (5 mg total) by mouth in the morning 90 tablet 0    triamcinolone (KENALOG) 0.025 % cream Apply topically 2 (two) times a day Apply sparingly 2-4 times a day for up to one week. 30 g 0    Tuberculin-Allergy Syringes (B-D ALLERGY SYRINGE 1CC/28G) 28G X 1/2\" 1 ML MISC Use every 7 days Use to admin SQ MTX weekly 10 each 6    [DISCONTINUED] B-D TB SYRINGE 1CC/27GX1/2\" 27G X 1/2\" 1 ML MISC USE EVERY 7 DAYS TO ADMINISTER SUBCUTANEOUS METHOTREXATE WEEKLY (Patient not taking: Reported on 4/28/2025)      [DISCONTINUED] triamcinolone (KENALOG) 0.1 % cream Apply twice daily for up to 2 " "weeks to all areas of concern (Patient not taking: Reported on 4/28/2025) 45 g 3     No current facility-administered medications on file prior to visit.      Social History     Tobacco Use    Smoking status: Never     Passive exposure: Never    Smokeless tobacco: Never   Vaping Use    Vaping status: Never Used   Substance and Sexual Activity    Alcohol use: Yes     Alcohol/week: 1.0 standard drink of alcohol     Types: 1 Cans of beer per week     Comment: socially    Drug use: Never    Sexual activity: Yes     Partners: Female     Birth control/protection: None        Medical History Reviewed by provider this encounter:  Tobacco  Allergies  Meds  Problems  Med Hx  Surg Hx  Fam Hx     .    Objective   /74 (BP Location: Left arm, Patient Position: Sitting, Cuff Size: Standard)   Pulse 86   Temp 97.7 °F (36.5 °C) (Tympanic)   Ht 5' 8.5\" (1.74 m)   Wt 125 kg (276 lb)   SpO2 97%   BMI 41.36 kg/m²     Physical Exam  Vitals reviewed.   Constitutional:       Appearance: Normal appearance. He is obese.   HENT:      Head: Normocephalic.      Nose: Nose normal.      Mouth/Throat:      Mouth: Mucous membranes are moist.      Pharynx: Oropharynx is clear.   Cardiovascular:      Rate and Rhythm: Normal rate and regular rhythm.      Pulses: Normal pulses.      Heart sounds: Normal heart sounds.   Pulmonary:      Effort: Pulmonary effort is normal.      Breath sounds: Normal breath sounds.   Musculoskeletal:         General: Normal range of motion.   Skin:     General: Skin is warm.      Capillary Refill: Capillary refill takes less than 2 seconds.   Neurological:      General: No focal deficit present.      Mental Status: He is alert and oriented to person, place, and time.   Psychiatric:         Mood and Affect: Mood normal.         Behavior: Behavior normal.         Diagnostic Data:  Labs: I personally reviewed the most recent laboratory data pertinent to today's visit.  Lab Results   Component Value Date    " "WBC 8.78 02/05/2025    HGB 12.3 02/05/2025    HCT 38.0 02/05/2025    MCV 88 02/05/2025     02/05/2025    EOSPCT 2 02/05/2025    EOSABS 0.15 02/05/2025    MONOPCT 11 02/05/2025     Lab Results   Component Value Date    CALCIUM 8.6 02/05/2025    K 4.3 02/05/2025    CO2 29 02/05/2025     02/05/2025    BUN 19 02/05/2025    CREATININE 0.91 02/05/2025     No results found for: \"IGE\", \"RAST\"  Lab Results   Component Value Date    ALT 14 02/05/2025    AST 13 02/05/2025    ALKPHOS 42 02/05/2025         SETH Ortiz      Answers submitted by the patient for this visit:  Pulmonology Questionnaire (Submitted on 4/27/2025)  Chief Complaint: Primary symptoms  Chronicity: recurrent  When did you first notice your symptoms?: more than 1 month ago  How often do your symptoms occur?: intermittently  Since you first noticed this problem, how has it changed?: resolved  Do you have shortness of breath that occurs with effort or exertion?: Yes  Do you have ear congestion?: No  Do you have heartburn?: No  Do you have fatigue?: Yes  Do you have nasal congestion?: No  Do you have shortness of breath when lying flat?: No  Do you have shortness of breath when you wake up?: Yes  Do you have sweats?: No  Have you experienced weight loss?: No  Which of the following makes your symptoms worse?: strenuous activity  Which of the following makes your symptoms better?: rest    "

## 2025-04-28 NOTE — ASSESSMENT & PLAN NOTE
No clear trigger and I suspect maybe has some chronic level of shortness of breath. This may be multifactorial - deconditioning, obesity. At this time his Gordon Sleepiness score is 5 (low risk for sleep apnea); Stop-BANG score is 5 (high risk) and he has no report of apneas. We will discuss at next visit.    I have asked him to schedule the PFT ordered prior.    Not clear to me why recurrent illness tends to leave him with lingering symptoms such as the prior subacute cough and the current shortness of breath.

## 2025-04-28 NOTE — ASSESSMENT & PLAN NOTE
Cough is resolved with no further issue.  He is following with Rheumatology for seronegative inflammatory arthritis and on methotrexate weekly

## 2025-05-05 ENCOUNTER — OFFICE VISIT (OUTPATIENT)
Dept: DERMATOLOGY | Facility: CLINIC | Age: 60
End: 2025-05-05
Payer: COMMERCIAL

## 2025-05-05 VITALS — WEIGHT: 276 LBS | TEMPERATURE: 97.6 F | BODY MASS INDEX: 41.36 KG/M2

## 2025-05-05 DIAGNOSIS — L20.89 OTHER ATOPIC DERMATITIS: ICD-10-CM

## 2025-05-05 DIAGNOSIS — D22.9 MULTIPLE MELANOCYTIC NEVI: ICD-10-CM

## 2025-05-05 DIAGNOSIS — D18.01 CHERRY ANGIOMA: ICD-10-CM

## 2025-05-05 DIAGNOSIS — Z85.820 HISTORY OF MELANOMA: Primary | ICD-10-CM

## 2025-05-05 DIAGNOSIS — L82.1 SEBORRHEIC KERATOSIS: ICD-10-CM

## 2025-05-05 DIAGNOSIS — D23.9 BLUE NEVUS: ICD-10-CM

## 2025-05-05 DIAGNOSIS — L81.4 LENTIGINES: ICD-10-CM

## 2025-05-05 DIAGNOSIS — Z12.83 SKIN EXAM, SCREENING FOR CANCER: ICD-10-CM

## 2025-05-05 DIAGNOSIS — Z12.83 SCREENING FOR MALIGNANT NEOPLASM OF SKIN: ICD-10-CM

## 2025-05-05 PROCEDURE — 99214 OFFICE O/P EST MOD 30 MIN: CPT | Performed by: REGISTERED NURSE

## 2025-05-05 RX ORDER — TRIAMCINOLONE ACETONIDE 0.25 MG/G
CREAM TOPICAL 2 TIMES DAILY
Qty: 30 G | Refills: 3 | Status: SHIPPED | OUTPATIENT
Start: 2025-05-05

## 2025-05-05 NOTE — PROGRESS NOTES
"[facility] Note     Patient Name: Haroldo Abreu  Encounter Date: 5/5/2025       Have you been cared for by a [facility] Dermatologist in the last 3 years and, if so, which description applies to you? Yes. I have been here within the last 3 years, and my medical history has NOT changed since that time. I am not of child-bearing potential.     REVIEW OF SYSTEMS:  Have you recently had or currently have any of the following? No changes in my recent health.   PAST MEDICAL HISTORY:  Have you personally ever had or currently have any of the following?  If \"YES,\" then please provide more detail. No changes in my medical history.   HISTORY OF IMMUNOSUPPRESSION: Do you have a history of any of the following:  Systemic Immunosuppression such as Diabetes, Biologic or Immunotherapy, Chemotherapy, Organ Transplantation, Bone Marrow Transplantation or Prednisone?  No     Answering \"YES\" requires the addition of the dotphrase \"IMMUNOSUPPRESSED\" as the first diagnosis of the patient's visit.   FAMILY HISTORY:  Any \"first degree relatives\" (parent, brother, sister, or child) with the following?    No changes in my family's known health.   PATIENT EXPERIENCE:    Do you want the Dermatologist to perform a COMPLETE skin exam today including a clinical examination under the \"bra and underwear\" areas?  Yes  If necessary, do we have your permission to call and leave a detailed message on your Preferred Phone number that includes your specific medical information?  Yes      No Known Allergies   Current Outpatient Medications:     folic acid (KP Folic Acid) 1 mg tablet, Take 1 tablet (1 mg total) by mouth daily, Disp: 30 tablet, Rfl: 6    ketoconazole (NIZORAL) 2 % cream, Apply 2x/day to bottom of feet and in between toes for 3 weeks. Apply on facial redness/rash as needed., Disp: 60 g, Rfl: 0    lisinopril (ZESTRIL) 20 mg tablet, Take 1 tablet (20 mg total) by mouth daily, Disp: 100 tablet, Rfl: 1    methotrexate 50 " "MG/2ML injection, Inject 0.8 mL (20 mg total) under the skin every 7 days, Disp: 4 mL, Rfl: 6    Multiple Vitamins-Minerals (MULTIVITAMIN ADULTS PO), Take 1 capsule by mouth daily, Disp: , Rfl:     RABEprazole (ACIPHEX) 20 MG tablet, Take 1 tablet (20 mg total) by mouth daily, Disp: 90 tablet, Rfl: 1    tadalafil (CIALIS) 5 MG tablet, Take 1 tablet (5 mg total) by mouth in the morning, Disp: 90 tablet, Rfl: 0    triamcinolone (KENALOG) 0.025 % cream, Apply topically 2 (two) times a day Apply sparingly 2-4 times a day for up to one week., Disp: 30 g, Rfl: 0    Tuberculin-Allergy Syringes (B-D ALLERGY SYRINGE 1CC/28G) 28G X 1/2\" 1 ML MISC, Use every 7 days Use to admin SQ MTX weekly, Disp: 10 each, Rfl: 6        Whom besides the patient is providing clinical information about today's encounter?   NO ADDITIONAL HISTORIAN (patient alone provided history)    Physical Exam and Assessment/Plan by Diagnosis:    HISTORY OF MELANOMA    Physical Exam:  Anatomic Location Affected:  Right upper lateral back  Morphological Description of Scar:  well healed scar  Year Treated: 2017             TNM Classification: T1B  Suspected Recurrence: no  Regional adenopathy: no    Additional History of Present Condition:   Patient had a history of melanoma on March 24, 2017.  Patient is being seen every year.  He states he is not aware of any new lesions, but has scattered moles and freckles.    Assessment and Plan:  Based on a thorough discussion of this condition and the management approach to it (including a comprehensive discussion of the known risks, side effects and potential benefits of treatment), the patient (family) agrees to implement the following specific plan:    Will continue to monitor for recurrence.  Recommend sunscreen with SPF 30 or higher daily with a wide hat, and sun protective clothing.   Continue following up every 6 months for skin examinations.     What happens at follow-up?  The main purpose of follow-up is to " "detect recurrences early (metastatic melanoma), but it also offers an opportunity to diagnose a new primary melanoma at the first possible opportunity. A second invasive melanoma occurs in 5-10% of melanoma patients and a new melanoma in situ is diagnosed in more than 20% of melanoma patients.     Our practice makes the following recommendations for follow-up for patients with invasive melanoma.  At-least \"monthly\" self-skin examinations   Routine skin checks by a board certified dermatologist  Follow-up intervals are \"every 3 months\" within 2 years of a new melanoma diagnosis; \"every 6 months\" between 2-4 years of a new melanoma diagnosis; and \"annually\" after 4 years of a new melanoma diagnosis  Individual patient's needs should be considered before an appropriate follow-up is offered  Provide education and support to help the patient adjust to their illness     Follow-up appointments should include:  A check of the scar where the primary melanoma was removed  Checking the regional lymph nodes  A general skin examination  A full physical examination at least annually by your primary care physician     In those with more advanced primary disease, follow-up may include:  Blood tests  Imaging: ultrasound, X-ray, CT, MRI and PET scan.     Most tests are not worthwhile for patients with stage 1 or 2 melanoma unless there are signs or symptoms of disease recurrence or metastasis. No tests are necessary for healthy patients who have remained well for five years or longer after removal of their melanoma.     What is the outlook for patients with melanoma?  Melanoma in situ is cured by excision because it has no potential to spread around the body.  The risk of spread and ultimate death from invasive melanoma depends on several factors, but the main one is the Breslow thickness of the melanoma at the time it was surgically removed.  Metastases are rare for melanomas < 0.75 mm and the risk for tumours 0.75-1 mm thick is " about 5%. The risk steadily increases with thickness so that melanomas > 4 mm have a risk of metastasis of about 40%.     Melanoma is a potentially serious type of skin cancer, in which there is uncontrolled growth of melanocytes (pigment cells). Melanoma is sometimes called malignant melanoma.  Normal melanocytes are found in the basal layer of the epidermis (the outer layer of skin). Melanocytes produce a protein called melanin, which protects skin cells by absorbing ultraviolet (UV) radiation. Melanocytes are found in equal numbers in black and white skin, but melanocytes in black skin produce much more melanin. People with dark brown or black skin are very much less likely to be damaged by UV radiation than those with white skin.     SEBORRHEIC KERATOSES  - Relevant exam: Scattered over the trunk/extremities are waxy brown to black plaques and papules with stuck on appearance  - Exam and clinical history consistent with seborrheic keratoses  - Counseled that these are benign growths that do not require treatment  - Counseled that removal of lesions is considered cosmetic and so would incur a fee should patient elect to move forward.   - Patient to hold on treatments for now but will inform us should they desire additional treatments    MELANOCYTIC NEVI  -Relevant exam: Scattered over the trunk/extremities are homogenously pigmented brown macules and papules. ELM performed and without concerning findings.  - Exam and clinical history consistent with melanocytic nevi  - Educated on the ABCDE's of melanoma; handout provided  - Counseled to return to clinic prior to scheduled appointment should any of these lesions change or should any new lesions of concern arise  - Counseled on use of sun protection daily. Reviewed latest FDA sunscreen guidelines, including use of broad spectrum (UVA and UVB blocking) sunscreen or sun protective clothing with SPF 30-50 every 2-3 hours and reapplied after exposure to water; use of  photoprotective clothing, including a broad brim hat and UPF rated clothing if outdoors for several hours; avoid use of tanning beds as these pose significant risk for melanoma and skin cancer.    LENTIGINES  OTHER SKIN CHANGES DUE TO CHRONIC EXPOSURE TO NONIONIZING RADIATION  - Relevant exam: Over sun exposed areas are brown macules. ELM performed and without concerning findings.  - Exam and clinical history consistent with lentigines.  - Educated that these are indicative of prior sun exposure.   - Counseled to return to clinic prior to scheduled appointment should any of these lesions change or should any new lesions of concern arise.  - Recommended use of sunscreen as above and below.  - Counseled on use of sun protection daily. Reviewed latest FDA sunscreen guidelines, including use of broad spectrum (UVA and UVB blocking) sunscreen or sun protective clothing with SPF 30-50 every 2-3 hours and reapplied after exposure to water; use of photoprotective clothing, including a broad brim hat and UPF rated clothing if outdoors for several hours; avoid use of tanning beds as these pose significant risk for melanoma and skin cancer.    CHERRY ANGIOMAS  - Relevant exam: Scattered over the trunk/extremities are red papules  - Exam and clinical history consistent with cherry angiomas  - Educated that these are benign  - Educated that removal is considered aesthetic and would incur a fee.  - Patient does not wish to pursue removal at this time but will contact us should this change.    BLUE NEVUS  Physical Exam:  Anatomic Location Affected:  left flank   Morphological Description:  grey macule   Pertinent Positives:  Pertinent Negatives:    Additional History of Present Condition:  noticed on exams     Assessment and Plan:  Based on a thorough discussion of this condition and the management approach to it (including a comprehensive discussion of the known risks, side effects and potential benefits of treatment), the  patient (family) agrees to implement the following specific plan:  Advised patient monitors for changes including color, size, symmetry, ulceration, bleeding and or tenderness.        Scribe Attestation      I,:  Jordin Shaevr MA am acting as a scribe while in the presence of the attending physician.:       I,:  Danilo Pate MD personally performed the services described in this documentation    as scribed in my presence.:

## 2025-05-06 ENCOUNTER — OFFICE VISIT (OUTPATIENT)
Dept: UROLOGY | Facility: CLINIC | Age: 60
End: 2025-05-06
Payer: COMMERCIAL

## 2025-05-06 VITALS
HEART RATE: 69 BPM | BODY MASS INDEX: 42.06 KG/M2 | WEIGHT: 284 LBS | SYSTOLIC BLOOD PRESSURE: 142 MMHG | OXYGEN SATURATION: 98 % | DIASTOLIC BLOOD PRESSURE: 80 MMHG | HEIGHT: 69 IN

## 2025-05-06 DIAGNOSIS — N40.1 BENIGN PROSTATIC HYPERPLASIA WITH POST-VOID DRIBBLING: ICD-10-CM

## 2025-05-06 DIAGNOSIS — Z85.47 HISTORY OF TESTICULAR CANCER: Primary | ICD-10-CM

## 2025-05-06 DIAGNOSIS — Z12.5 SCREENING FOR PROSTATE CANCER: ICD-10-CM

## 2025-05-06 DIAGNOSIS — N39.43 BENIGN PROSTATIC HYPERPLASIA WITH POST-VOID DRIBBLING: ICD-10-CM

## 2025-05-06 PROCEDURE — 99213 OFFICE O/P EST LOW 20 MIN: CPT | Performed by: PHYSICIAN ASSISTANT

## 2025-05-06 RX ORDER — ALFUZOSIN HYDROCHLORIDE 10 MG/1
10 TABLET, EXTENDED RELEASE ORAL DAILY
Qty: 90 TABLET | Refills: 3 | Status: SHIPPED | OUTPATIENT
Start: 2025-05-06

## 2025-05-06 NOTE — ASSESSMENT & PLAN NOTE
Lab Results   Component Value Date    PSA 0.40 05/24/2024    PSA 0.4 12/17/2021     psa due this year    Orders:    PSA, Total Screen; Future

## 2025-05-06 NOTE — ASSESSMENT & PLAN NOTE
cialis 5mg daily still notes some postvoid dribbling. feels meds are about the same for symptoms no better or worse. considering alternative  hold the cialis for a month and trial alfuzosin in its place. he will let me know after a few weeks which he finds better  Orders:    alfuzosin (UROXATRAL) 10 mg 24 hr tablet; Take 1 tablet (10 mg total) by mouth daily

## 2025-05-06 NOTE — ASSESSMENT & PLAN NOTE
orchiectomy with adjuvant radiation therapy 2000 ([facility])  no evidence of disease recurrence on formal surveillance through 2007, with repeat in 2023 ct scan and tumor markers all negative

## 2025-05-06 NOTE — PROGRESS NOTES
Name: Haroldo Abreu      : 1965      MRN: [id number]  Encounter Provider: Tanvi Gaines PA-C  Encounter Date: 2025   Encounter department: [facility]  :  Assessment & Plan  History of testicular cancer  orchiectomy with adjuvant radiation therapy  ([facility])  no evidence of disease recurrence on formal surveillance through , with repeat in  ct scan and tumor markers all negative       Benign prostatic hyperplasia with post-void dribbling  cialis 5mg daily still notes some postvoid dribbling. feels meds are about the same for symptoms no better or worse. considering alternative  hold the cialis for a month and trial alfuzosin in its place. he will let me know after a few weeks which he finds better  Orders:    alfuzosin (UROXATRAL) 10 mg 24 hr tablet; Take 1 tablet (10 mg total) by mouth daily    Screening for prostate cancer  Lab Results   Component Value Date    PSA 0.40 2024    PSA 0.4 2021     psa due this year    Orders:    PSA, Total Screen; Future        History of Present Illness   Haroldo Abreu is a 59 y.o. male who presents annual follow-up prostate cancer screening and mild lower urinary tract bother with urinary frequency and postvoid dribbling.  He is on daily tadalafil 5 mg with some improvement still has some postvoid dribbling which is bothersome.    Remote testicular cancer treated with inguinal right orchiectomy in  adjuvant radiation therapy with no chemotherapy.  Surveillance CT abdomen pelvis with contrast and tumor markers for at least 7 years posttreatment with no recurrence.  CT chest in  showed no evidence of disease, that was a follow-up for a pneumonia.  He has begun colorectal screening with 2 colonoscopies thus far.    AUA SYMPTOM SCORE      Flowsheet Row Most Recent Value   AUA SYMPTOM SCORE    How often have you had a sensation of not emptying your bladder completely after you finished urinating? 2  "(P)     How often have you had to urinate again less than two hours after you finished urinating? 2 (P)     How often have you found you stopped and started again several times when you urinate? 3 (P)     How often have you found it difficult to postpone urination? 0 (P)     How often have you had a weak urinary stream? 1 (P)     How often have you had to push or strain to begin urination? 0 (P)     How many times did you most typically get up to urinate from the time you went to bed at night until the time you got up in the morning? 1 (P)     Quality of Life: If you were to spend the rest of your life with your urinary condition just the way it is now, how would you feel about that? 3 (P)     AUA SYMPTOM SCORE 9 (P)            Review of Systems   Constitutional: Negative.    Respiratory: Negative.     Cardiovascular: Negative.    Genitourinary:  Negative for decreased urine volume, difficulty urinating, dysuria, flank pain, frequency, hematuria and urgency.   Musculoskeletal: Negative.           Objective   /80 (BP Location: Right arm, Patient Position: Sitting, Cuff Size: Adult)   Pulse 69   Ht 5' 9\" (1.753 m)   Wt 129 kg (284 lb)   SpO2 98%   BMI 41.94 kg/m²     Physical Exam  Vitals and nursing note reviewed.   Constitutional:       General: He is not in acute distress.     Appearance: He is well-developed. He is not diaphoretic.   HENT:      Head: Normocephalic and atraumatic.   Pulmonary:      Effort: Pulmonary effort is normal.   Musculoskeletal:      Right lower leg: No edema.      Left lower leg: No edema.   Skin:     General: Skin is warm.   Neurological:      Mental Status: He is alert and oriented to person, place, and time.           Results   Lab Results   Component Value Date    PSA 0.40 05/24/2024    PSA 0.4 12/17/2021     Lab Results   Component Value Date    CALCIUM 8.6 02/05/2025    K 4.3 02/05/2025    CO2 29 02/05/2025     02/05/2025    BUN 19 02/05/2025    CREATININE 0.91 " 02/05/2025     Lab Results   Component Value Date    WBC 8.78 02/05/2025    HGB 12.3 02/05/2025    HCT 38.0 02/05/2025    MCV 88 02/05/2025     02/05/2025       Office Urine Dip  No results found for this or any previous visit (from the past hour).

## 2025-05-14 ENCOUNTER — APPOINTMENT (OUTPATIENT)
Dept: LAB | Facility: HOSPITAL | Age: 60
End: 2025-05-14
Payer: COMMERCIAL

## 2025-05-14 ENCOUNTER — RESULTS FOLLOW-UP (OUTPATIENT)
Dept: FAMILY MEDICINE CLINIC | Facility: CLINIC | Age: 60
End: 2025-05-14

## 2025-05-14 DIAGNOSIS — I10 ESSENTIAL HYPERTENSION: ICD-10-CM

## 2025-05-14 DIAGNOSIS — M13.80 SERONEGATIVE INFLAMMATORY ARTHRITIS: ICD-10-CM

## 2025-05-14 DIAGNOSIS — D64.9 ANEMIA, UNSPECIFIED TYPE: ICD-10-CM

## 2025-05-14 DIAGNOSIS — Z12.5 SCREENING FOR PROSTATE CANCER: ICD-10-CM

## 2025-05-14 LAB
ALBUMIN SERPL BCG-MCNC: 4 G/DL (ref 3.5–5)
ALP SERPL-CCNC: 59 U/L (ref 34–104)
ALT SERPL W P-5'-P-CCNC: 20 U/L (ref 7–52)
ANION GAP SERPL CALCULATED.3IONS-SCNC: 5 MMOL/L (ref 4–13)
ANISOCYTOSIS BLD QL SMEAR: PRESENT
AST SERPL W P-5'-P-CCNC: 18 U/L (ref 13–39)
BASOPHILS # BLD MANUAL: 0.07 THOUSAND/UL (ref 0–0.1)
BASOPHILS NFR MAR MANUAL: 1 % (ref 0–1)
BILIRUB SERPL-MCNC: 0.73 MG/DL (ref 0.2–1)
BUN SERPL-MCNC: 15 MG/DL (ref 5–25)
CALCIUM SERPL-MCNC: 8.7 MG/DL (ref 8.4–10.2)
CHLORIDE SERPL-SCNC: 105 MMOL/L (ref 96–108)
CO2 SERPL-SCNC: 29 MMOL/L (ref 21–32)
CREAT SERPL-MCNC: 0.85 MG/DL (ref 0.6–1.3)
EOSINOPHIL # BLD MANUAL: 0.14 THOUSAND/UL (ref 0–0.4)
EOSINOPHIL NFR BLD MANUAL: 2 % (ref 0–6)
ERYTHROCYTE [DISTWIDTH] IN BLOOD BY AUTOMATED COUNT: 13.4 % (ref 11.6–15.1)
GFR SERPL CREATININE-BSD FRML MDRD: 95 ML/MIN/1.73SQ M
GLUCOSE P FAST SERPL-MCNC: 82 MG/DL (ref 65–99)
HCT VFR BLD AUTO: 37.3 % (ref 36.5–49.3)
HGB BLD-MCNC: 12.5 G/DL (ref 12–17)
LYMPHOCYTES # BLD AUTO: 2.07 THOUSAND/UL (ref 0.6–4.47)
LYMPHOCYTES # BLD AUTO: 29 % (ref 14–44)
MCH RBC QN AUTO: 29.8 PG (ref 26.8–34.3)
MCHC RBC AUTO-ENTMCNC: 33.5 G/DL (ref 31.4–37.4)
MCV RBC AUTO: 89 FL (ref 82–98)
MONOCYTES # BLD AUTO: 0.43 THOUSAND/UL (ref 0–1.22)
MONOCYTES NFR BLD: 6 % (ref 4–12)
NEUTROPHILS # BLD MANUAL: 4.42 THOUSAND/UL (ref 1.85–7.62)
NEUTS SEG NFR BLD AUTO: 62 % (ref 43–75)
PLATELET # BLD AUTO: 246 THOUSANDS/UL (ref 149–390)
PLATELET BLD QL SMEAR: ADEQUATE
PMV BLD AUTO: 10.9 FL (ref 8.9–12.7)
POTASSIUM SERPL-SCNC: 4.3 MMOL/L (ref 3.5–5.3)
PROT SERPL-MCNC: 6.9 G/DL (ref 6.4–8.4)
PSA SERPL-MCNC: 0.52 NG/ML (ref 0–4)
RBC # BLD AUTO: 4.19 MILLION/UL (ref 3.88–5.62)
RBC MORPH BLD: PRESENT
SODIUM SERPL-SCNC: 139 MMOL/L (ref 135–147)
WBC # BLD AUTO: 7.13 THOUSAND/UL (ref 4.31–10.16)

## 2025-05-14 PROCEDURE — G0103 PSA SCREENING: HCPCS

## 2025-05-14 PROCEDURE — 36415 COLL VENOUS BLD VENIPUNCTURE: CPT

## 2025-05-14 PROCEDURE — 80053 COMPREHEN METABOLIC PANEL: CPT

## 2025-05-14 PROCEDURE — 85027 COMPLETE CBC AUTOMATED: CPT

## 2025-05-14 PROCEDURE — 85007 BL SMEAR W/DIFF WBC COUNT: CPT

## 2025-06-02 NOTE — RESULT ENCOUNTER NOTE
Hemoglobin/blood count has remained stable/normal over the past 8 months.  No GI intervention needed.  Yearly follow up as planned.

## 2025-06-05 PROBLEM — Z12.5 SCREENING FOR PROSTATE CANCER: Status: RESOLVED | Noted: 2024-08-15 | Resolved: 2025-06-05

## 2025-06-10 PROBLEM — M13.80 SERONEGATIVE INFLAMMATORY ARTHRITIS: Status: ACTIVE | Noted: 2025-06-10

## 2025-06-10 RX ORDER — ZOSTER VACCINE RECOMBINANT, ADJUVANTED 50 MCG/0.5
0.5 KIT INTRAMUSCULAR ONCE
Qty: 1 EACH | Refills: 1 | Status: CANCELLED | OUTPATIENT
Start: 2025-06-10 | End: 2025-06-10

## 2025-06-10 NOTE — PATIENT INSTRUCTIONS
"Patient Education     Routine physical for adults   The Basics   Written by the doctors and editors at [facility]   What is a physical? -- A physical is a routine visit, or \"check-up,\" with your doctor. You might also hear it called a \"wellness visit\" or \"preventive visit.\"  During each visit, the doctor will:   Ask about your physical and mental health   Ask about your habits, behaviors, and lifestyle   Do an exam   Give you vaccines if needed   Talk to you about any medicines you take   Give advice about your health   Answer your questions  Getting regular check-ups is an important part of taking care of your health. It can help your doctor find and treat any problems you have. But it's also important for preventing health problems.  A routine physical is different from a \"sick visit.\" A sick visit is when you see a doctor because of a health concern or problem. Since physicals are scheduled ahead of time, you can think about what you want to ask the doctor.  How often should I get a physical? -- It depends on your age and health. In general, for people age 21 years and older:   If you are younger than 50 years, you might be able to get a physical every 3 years.   If you are 50 years or older, your doctor might recommend a physical every year.  If you have an ongoing health condition, like diabetes or high blood pressure, your doctor will probably want to see you more often.  What happens during a physical? -- In general, each visit will include:   Physical exam - The doctor or nurse will check your height, weight, heart rate, and blood pressure. They will also look at your eyes and ears. They will ask about how you are feeling and whether you have any symptoms that bother you.   Medicines - It's a good idea to bring a list of all the medicines you take to each doctor visit. Your doctor will talk to you about your medicines and answer any questions. Tell them if you are having any side effects that bother you. You " "should also tell them if you are having trouble paying for any of your medicines.   Habits and behaviors - This includes:   Your diet   Your exercise habits   Whether you smoke, drink alcohol, or use drugs   Whether you are sexually active   Whether you feel safe at home  Your doctor will talk to you about things you can do to improve your health and lower your risk of health problems. They will also offer help and support. For example, if you want to quit smoking, they can give you advice and might prescribe medicines. If you want to improve your diet or get more physical activity, they can help you with this, too.   Lab tests, if needed - The tests you get will depend on your age and situation. For example, your doctor might want to check your:   Cholesterol   Blood sugar   Iron level   Vaccines - The recommended vaccines will depend on your age, health, and what vaccines you already had. Vaccines are very important because they can prevent certain serious or deadly infections.   Discussion of screening - \"Screening\" means checking for diseases or other health problems before they cause symptoms. Your doctor can recommend screening based on your age, risk, and preferences. This might include tests to check for:   Cancer, such as breast, prostate, cervical, ovarian, colorectal, prostate, lung, or skin cancer   Sexually transmitted infections, such as chlamydia and gonorrhea   Mental health conditions like depression and anxiety  Your doctor will talk to you about the different types of screening tests. They can help you decide which screenings to have. They can also explain what the results might mean.   Answering questions - The physical is a good time to ask the doctor or nurse questions about your health. If needed, they can refer you to other doctors or specialists, too.  Adults older than 65 years often need other care, too. As you get older, your doctor will talk to you about:   How to prevent falling at " home   Hearing or vision tests   Memory testing   How to take your medicines safely   Making sure that you have the help and support you need at home  All topics are updated as new evidence becomes available and our peer review process is complete.  This topic retrieved from [employer] on: May 02, 2024.  Topic 813904 Version 1.0  Release: 32.4.3 - C32.122  © 2024 UpToDate, Inc. and/or its affiliates. All rights reserved.  Consumer Information Use and Disclaimer   Disclaimer: This generalized information is a limited summary of diagnosis, treatment, and/or medication information. It is not meant to be comprehensive and should be used as a tool to help the user understand and/or assess potential diagnostic and treatment options. It does NOT include all information about conditions, treatments, medications, side effects, or risks that may apply to a specific patient. It is not intended to be medical advice or a substitute for the medical advice, diagnosis, or treatment of a health care provider based on the health care provider's examination and assessment of a patient's specific and unique circumstances. Patients must speak with a health care provider for complete information about their health, medical questions, and treatment options, including any risks or benefits regarding use of medications. This information does not endorse any treatments or medications as safe, effective, or approved for treating a specific patient. UpToDate, Inc. and its affiliates disclaim any warranty or liability relating to this information or the use thereof.The use of this information is governed by the Terms of Use, available at https://www.woltersAirWatchuwer.com/en/know/clinical-effectiveness-terms. 2024© UpToDate, Inc. and its affiliates and/or licensors. All rights reserved.  Copyright   © 2024 UpToDate, Inc. and/or its affiliates. All rights reserved.

## 2025-06-10 NOTE — ASSESSMENT & PLAN NOTE
orchiectomy with adjuvant radiation therapy 2000 ([facility])  no evidence of disease recurrence on formal surveillance through 2007, with repeat in 2023 ct scan and tumor markers all negative    Most recently saw urology at [facility] on 5/6/25

## 2025-06-10 NOTE — ASSESSMENT & PLAN NOTE
"Follows with rheum at [facility]  Per their last note in march of this year, \"Recommend to continue with methotrexate 20 mg weekly and daily folic acid. Continue lab monitoring every 3 months. If he has recurrence of the inflammatory Tritus or rash, we will plan to increase methotrexate to 25 mg weekly but otherwise plan to keep current dose. Patient will follow-up in 3 months. \"         "

## 2025-06-10 NOTE — PROGRESS NOTES
"Adult Annual Physical  Name: Haroldo Abreu      : 1965      MRN: [id number]  Encounter Provider: Sherlyn Victoria DO  Encounter Date: 2025   Encounter department: [facility]    :  Assessment & Plan  Annual physical exam  Discussed importance of diet and exercise  His colon cancer screening is up to date  Had PSA on 25.   Due for shingrix and pneumococcal vaccines. Declines (says rheum does not want him to have because of methotrexate)       History of testicular cancer  orchiectomy with adjuvant radiation therapy  ([facility])  no evidence of disease recurrence on formal surveillance through , with repeat in  ct scan and tumor markers all negative    Most recently saw urology at [facility] on 25         Seronegative inflammatory arthritis  Follows with rheum at [facility]  Per their last note in march of this year, \"Recommend to continue with methotrexate 20 mg weekly and daily folic acid. Continue lab monitoring every 3 months. If he has recurrence of the inflammatory Tritus or rash, we will plan to increase methotrexate to 25 mg weekly but otherwise plan to keep current dose. Patient will follow-up in 3 months. \"         Morbid obesity with BMI of 40.0-44.9, adult ([facility])  Prior Authorization Clinical Questions for Weight Management Pharmacotherapy    1. Does the patient have a contrainidcation to medication prescribed for weight management?: No  2. Does the patient have a diagnosis of obesity, confirmed by a BMI greater than or equal to 30 kg/m^2?: Yes  3. Does the patient have a BMI of greater than or equal to 27 kg/m^2 with at least one weight-related comorbidity/risk factor/complication (e.g. diabetes, dyslipidemia, coronary artery disease)?: Yes  4. Weight-related co-morbidities/risk factors: dyslipidemia, hypertension, GERD, osteoarthritis  5. WEGOVY CVA Indication: Does patient have established documented cardiovascular disease (history of a " prior heart attack (myocardial infarction), stroke, or symptomatic peripheral arterial disease (PAD)?: No  6. ZEPBOUND MARIELLE Indication: Does patient have documented MARIELLE diagnosed via sleep study (insurance will require copy of sleep study results for approval)?: No  7. Has the patient been on a weight loss regimen of low-calorie diet, increased physical activity, and lifestyle modifications for a minimum of 6 months?: No  8. Has the patient completed a comprehensive weight loss program (ie, Weight Watchers, Noom, Bariatrics, other chaz on phone)? If so, what?: No  9. Does the patient have a history of type 2 diabetes?: No  10. Has the member tried and failed other weight loss medication within the past 12 months?: No  11. Will the member use requested medication in combination with another GLP agonist or weight loss drug?: No  12. Is the medication a controlled substance?: No     Baseline weight (in pounds): 279.6 lbs  Current weight (in pounds): 279.6 lbs  Weight loss percentage: 0%              Suspected sleep apnea  He had discussed this with pulmonology at time of their visit in April. Per their note, discuss again at f/u in 6 months.          Benign prostatic hyperplasia with post-void dribbling  Follows with urology and on uroxatral.          Class 3 severe obesity due to excess calories with serious comorbidity and body mass index (BMI) of 40.0 to 44.9 in adult  Prior Authorization Clinical Questions for Weight Management Pharmacotherapy    1. Does the patient have a contrainidcation to medication prescribed for weight management?: No  2. Does the patient have a diagnosis of obesity, confirmed by a BMI greater than or equal to 30 kg/m^2?: Yes  3. Does the patient have a BMI of greater than or equal to 27 kg/m^2 with at least one weight-related comorbidity/risk factor/complication (e.g. diabetes, dyslipidemia, coronary artery disease)?: Yes  4. Weight-related co-morbidities/risk factors: dyslipidemia,  hypertension, GERD, osteoarthritis  5. WEGOVY CVA Indication: Does patient have established documented cardiovascular disease (history of a prior heart attack (myocardial infarction), stroke, or symptomatic peripheral arterial disease (PAD)?: No  6. ZEPBOUND MARIELLE Indication: Does patient have documented MARIELLE diagnosed via sleep study (insurance will require copy of sleep study results for approval)?: No  7. Has the patient been on a weight loss regimen of low-calorie diet, increased physical activity, and lifestyle modifications for a minimum of 6 months?: No  8. Has the patient completed a comprehensive weight loss program (ie, Weight Watchers, Noom, Bariatrics, other chaz on phone)? If so, what?: No  9. Does the patient have a history of type 2 diabetes?: No  10. Has the member tried and failed other weight loss medication within the past 12 months?: No  11. Will the member use requested medication in combination with another GLP agonist or weight loss drug?: No  12. Is the medication a controlled substance?: No     Baseline weight (in pounds): 279.6 lbs  Current weight (in pounds): 279.6 lbs  Weight loss percentage: 0%        Patient denies personal and family history of MCT and MEN2 tumors. Patient denies personal history of pancreatitis   Discussed potential med side effects  Recheck 1 month for titration of medication.   Orders:  •  tirzepatide (Zepbound) 2.5 mg/0.5 mL auto-injector; Inject 0.5 mL (2.5 mg total) under the skin once a week for 28 days    Low energy  Check testosterone  Discuss sleep study with pulmonology  Orders:  •  Testosterone; Future    GUADALUPE (dyspnea on exertion)  Saw pulmonology for this in April. Thought to be multifactorial  Scheduled for PFT's later this week.   Refer to cardiology for evaluation as well given that he had some coronary calcification on his CT chest last summer    Orders:  •  Ambulatory Referral to Cardiology; Future    Coronary artery calcification seen on CT  scan    Orders:  •  Ambulatory Referral to Cardiology; Future    Thyroid nodule  CT neck soft tissue 7/29/22 showed Subcentimeter hypodense nodule in right thyroid lobe.  Incidental discovery of one or more thyroid nodule(s) measuring less than 1.5 cm and without suspicious features is noted in this patient who is above 35 years old; according to   guidelines published in the February 2015 white paper on incidental thyroid nodules in the Journal of the American College of Radiology (JACR), no further evaluation is recommended     Seen again on CT of chest done 7/19/24 in ED  (Stable 1.7 cm right thyroid nodule. Recommend further evaluation with ultrasound).    US ordered 12/6/24    Reminded patient again today that this needs to be scheduled         Essential hypertension  Bp at goal on current meds  Continue same  Reviewed cmp  Lab Results   Component Value Date    SODIUM 139 05/14/2025    K 4.3 05/14/2025     05/14/2025    CO2 29 05/14/2025    AGAP 5 05/14/2025    BUN 15 05/14/2025    CREATININE 0.85 05/14/2025    GLUC 79 09/30/2024    GLUF 82 05/14/2025    CALCIUM 8.7 05/14/2025    AST 18 05/14/2025    ALT 20 05/14/2025    ALKPHOS 59 05/14/2025    TP 6.9 05/14/2025    TBILI 0.73 05/14/2025    EGFR 95 05/14/2025              Gastroesophageal reflux disease, unspecified whether esophagitis present  Stable on aciphex  Followed by [facility]  Last EGD was in 2021         Chronic cough  Patient wondering what he can do for cough.   Seeing pulmonology for this and will defer to them.        Annual physical exam               Preventive Screenings:    - Prostate cancer screening: screening up-to-date     Immunizations:  - Immunizations due: Prevnar 20 and Zoster (Shingrix)         History of Present Illness     Adult Annual Physical:  Patient presents for annual physical.     Diet and Physical Activity:  - Diet/Nutrition: no special diet.  - Exercise: no formal exercise.    Depression Screening:  - PHQ-2 Score:  "0    General Health:  - Sleep: sleeps well.  - Hearing: decreased hearing right ear and requires use of hearing aids.  - Vision: vision problems and wears glasses. Need glassed to read small print  - Dental: regular dental visits and brushes teeth twice daily.    /GYN Health:  - Follows with GYN: no.   - History of STDs: no     Health:  - History of STDs: no.   - Urinary symptoms: urinary frequency, incomplete bladder emptying and post-void dribbling.     Advanced Care Planning:  - Has an advanced directive?: no    - Has a durable medical POA?: no        Patient is a 59 year old male with hypertension, aortic root dilation, GERD, BPH, OA knees, inflammatory seronegative arthritis, history of testicular CA and history of melanoma who is being seen today for annual physical and f/u on chronic conditions.     Colon cancer screen is up to date (1/11/21)  Overdue for second shingrix.   Due for pneumococcal vaccine. Declines both today    Saw pulmonology in April for f/u on his cough and his dyspnea on exertion.     Suspected that his shortness of breath was multifactorial - deconditioning, obesity. At this time his [place] Sleepiness score is 5 (low risk for sleep apnea); Stop-BANG score is 5 (high risk) and he has no report of apneas. Advised that they will discuss at next visit in 6 months.     States that he still has a cough. Worse last couple of weeks. No chest congestion. No increase in his chronic SOB. No nasal congestion or post nasal drip    Saw his rheumatologist in March for his seronegative inflammatory arthritis who recommended that he \" continue with methotrexate 20 mg weekly and daily folic acid. Continue lab monitoring every 3 months. If he has recurrence of the inflammatory Tritus or rash, we will plan to increase methotrexate to 25 mg weekly but otherwise plan to keep current dose. Patient will follow-up in 3 months. \"    Scheduled for PFT's later this week.     Knees are not good. No energy for " exercise    He is interested in starting meds for his obesity.        Review of Systems  Medical History Reviewed by provider this encounter:  Tobacco  Allergies  Meds  Problems  Med Hx  Surg Hx  Fam Hx  Soc   Hx    .  Medications Ordered Prior to Encounter[1]     Objective   /73   Pulse (!) 109   Wt 127 kg (279 lb 9.6 oz)   SpO2 98%   BMI 41.29 kg/m²     Physical Exam  Vitals and nursing note reviewed.   Constitutional:       General: He is not in acute distress.     Appearance: Normal appearance. He is obese. He is not ill-appearing, toxic-appearing or diaphoretic.   HENT:      Head: Normocephalic and atraumatic.      Left Ear: Tympanic membrane normal.      Ears:      Comments: Hearing aid right ear     Nose: Nose normal.      Mouth/Throat:      Mouth: Mucous membranes are moist.      Pharynx: No posterior oropharyngeal erythema.     Eyes:      Extraocular Movements: Extraocular movements intact.      Conjunctiva/sclera: Conjunctivae normal.      Pupils: Pupils are equal, round, and reactive to light.       Cardiovascular:      Rate and Rhythm: Regular rhythm. Tachycardia present.      Heart sounds: No murmur heard.  Pulmonary:      Effort: Pulmonary effort is normal.      Breath sounds: Normal breath sounds. No wheezing or rales.   Abdominal:      Palpations: Abdomen is soft.      Tenderness: There is no abdominal tenderness.     Musculoskeletal:      Cervical back: Normal range of motion and neck supple.      Right lower leg: No edema.      Left lower leg: No edema.   Lymphadenopathy:      Cervical: No cervical adenopathy.     Skin:     Findings: No rash.     Neurological:      General: No focal deficit present.      Mental Status: He is alert and oriented to person, place, and time.     Psychiatric:         Mood and Affect: Mood normal.            [1]  Current Outpatient Medications on File Prior to Visit   Medication Sig Dispense Refill   • alfuzosin (UROXATRAL) 10 mg 24 hr tablet Take 1  "tablet (10 mg total) by mouth daily 90 tablet 3   • folic acid (KP Folic Acid) 1 mg tablet Take 1 tablet (1 mg total) by mouth daily 30 tablet 6   • ketoconazole (NIZORAL) 2 % cream Apply 2x/day to bottom of feet and in between toes for 3 weeks. Apply on facial redness/rash as needed. 60 g 0   • lisinopril (ZESTRIL) 20 mg tablet Take 1 tablet (20 mg total) by mouth daily 100 tablet 1   • methotrexate 50 MG/2ML injection Inject 0.8 mL (20 mg total) under the skin every 7 days 4 mL 6   • Multiple Vitamins-Minerals (MULTIVITAMIN ADULTS PO) Take 1 capsule by mouth in the morning.     • RABEprazole (ACIPHEX) 20 MG tablet Take 1 tablet (20 mg total) by mouth daily 90 tablet 1   • triamcinolone (KENALOG) 0.025 % cream Apply topically 2 (two) times a day Apply twice daily for up to 2 weeks. Can repeat for flare ups (Patient taking differently: Apply 1 Application topically 2 (two) times a day as needed for irritation or rash Apply twice daily for up to 2 weeks. Can repeat for flare ups) 30 g 3   • Tuberculin-Allergy Syringes (B-D ALLERGY SYRINGE 1CC/28G) 28G X 1/2\" 1 ML MISC Use every 7 days Use to admin SQ MTX weekly 10 each 6   • [DISCONTINUED] tadalafil (CIALIS) 5 MG tablet Take 1 tablet (5 mg total) by mouth in the morning 90 tablet 0     No current facility-administered medications on file prior to visit.   "

## 2025-06-11 ENCOUNTER — OFFICE VISIT (OUTPATIENT)
Dept: FAMILY MEDICINE CLINIC | Facility: CLINIC | Age: 60
End: 2025-06-11
Payer: COMMERCIAL

## 2025-06-11 ENCOUNTER — TELEPHONE (OUTPATIENT)
Age: 60
End: 2025-06-11

## 2025-06-11 VITALS
HEART RATE: 109 BPM | BODY MASS INDEX: 41.29 KG/M2 | OXYGEN SATURATION: 98 % | SYSTOLIC BLOOD PRESSURE: 124 MMHG | WEIGHT: 279.6 LBS | DIASTOLIC BLOOD PRESSURE: 80 MMHG

## 2025-06-11 DIAGNOSIS — K21.9 GASTROESOPHAGEAL REFLUX DISEASE, UNSPECIFIED WHETHER ESOPHAGITIS PRESENT: ICD-10-CM

## 2025-06-11 DIAGNOSIS — R06.09 DOE (DYSPNEA ON EXERTION): ICD-10-CM

## 2025-06-11 DIAGNOSIS — Z00.00 ANNUAL PHYSICAL EXAM: Primary | ICD-10-CM

## 2025-06-11 DIAGNOSIS — R29.818 SUSPECTED SLEEP APNEA: ICD-10-CM

## 2025-06-11 DIAGNOSIS — N40.1 BENIGN PROSTATIC HYPERPLASIA WITH POST-VOID DRIBBLING: ICD-10-CM

## 2025-06-11 DIAGNOSIS — E66.01 MORBID OBESITY WITH BMI OF 40.0-44.9, ADULT (HCC): ICD-10-CM

## 2025-06-11 DIAGNOSIS — N39.43 BENIGN PROSTATIC HYPERPLASIA WITH POST-VOID DRIBBLING: ICD-10-CM

## 2025-06-11 DIAGNOSIS — I10 ESSENTIAL HYPERTENSION: ICD-10-CM

## 2025-06-11 DIAGNOSIS — M13.80 SERONEGATIVE INFLAMMATORY ARTHRITIS: ICD-10-CM

## 2025-06-11 DIAGNOSIS — E04.1 THYROID NODULE: ICD-10-CM

## 2025-06-11 DIAGNOSIS — R05.3 CHRONIC COUGH: ICD-10-CM

## 2025-06-11 DIAGNOSIS — E66.813 CLASS 3 SEVERE OBESITY DUE TO EXCESS CALORIES WITH SERIOUS COMORBIDITY AND BODY MASS INDEX (BMI) OF 40.0 TO 44.9 IN ADULT: ICD-10-CM

## 2025-06-11 DIAGNOSIS — I25.10 CORONARY ARTERY CALCIFICATION SEEN ON CT SCAN: ICD-10-CM

## 2025-06-11 DIAGNOSIS — Z85.47 HISTORY OF TESTICULAR CANCER: ICD-10-CM

## 2025-06-11 DIAGNOSIS — R53.83 LOW ENERGY: ICD-10-CM

## 2025-06-11 PROCEDURE — 99396 PREV VISIT EST AGE 40-64: CPT | Performed by: FAMILY MEDICINE

## 2025-06-11 PROCEDURE — 99214 OFFICE O/P EST MOD 30 MIN: CPT | Performed by: FAMILY MEDICINE

## 2025-06-11 RX ORDER — TIRZEPATIDE 2.5 MG/.5ML
2.5 INJECTION, SOLUTION SUBCUTANEOUS WEEKLY
Qty: 2 ML | Refills: 0 | Status: SHIPPED | OUTPATIENT
Start: 2025-06-11 | End: 2025-06-11

## 2025-06-11 RX ORDER — TIRZEPATIDE 2.5 MG/.5ML
2.5 INJECTION, SOLUTION SUBCUTANEOUS WEEKLY
Qty: 2 ML | Refills: 0 | Status: SHIPPED | OUTPATIENT
Start: 2025-06-11 | End: 2025-07-09

## 2025-06-11 NOTE — ASSESSMENT & PLAN NOTE
He had discussed this with pulmonology at time of their visit in April. Per their note, discuss again at f/u in 6 months.

## 2025-06-11 NOTE — TELEPHONE ENCOUNTER
PA for Zepbound 2.5mg SUBMITTED to [employer]     via    []CMM-KEY:   [x]Surescripts-Case ID #: [id number]   []Availity-Auth ID #   []Faxed to plan   []Other website   []Phone call Case ID #     []PA sent as URGENT    All office notes, labs and other pertaining documents and studies sent. Clinical questions answered. Awaiting determination from insurance company.     Turnaround time for your insurance to make a decision on your Prior Authorization can take 7-21 business days.

## 2025-06-11 NOTE — ASSESSMENT & PLAN NOTE
Saw pulmonology for this in April. Thought to be multifactorial  Scheduled for PFT's later this week.   Refer to cardiology for evaluation as well given that he had some coronary calcification on his CT chest last summer    Orders:    Ambulatory Referral to Cardiology; Future

## 2025-06-11 NOTE — ASSESSMENT & PLAN NOTE
Bp at goal on current meds  Continue same  Reviewed cmp  Lab Results   Component Value Date    SODIUM 139 05/14/2025    K 4.3 05/14/2025     05/14/2025    CO2 29 05/14/2025    AGAP 5 05/14/2025    BUN 15 05/14/2025    CREATININE 0.85 05/14/2025    GLUC 79 09/30/2024    GLUF 82 05/14/2025    CALCIUM 8.7 05/14/2025    AST 18 05/14/2025    ALT 20 05/14/2025    ALKPHOS 59 05/14/2025    TP 6.9 05/14/2025    TBILI 0.73 05/14/2025    EGFR 95 05/14/2025

## 2025-06-11 NOTE — ASSESSMENT & PLAN NOTE
CT neck soft tissue 7/29/22 showed Subcentimeter hypodense nodule in right thyroid lobe.  Incidental discovery of one or more thyroid nodule(s) measuring less than 1.5 cm and without suspicious features is noted in this patient who is above 35 years old; according to   guidelines published in the February 2015 white paper on incidental thyroid nodules in the Journal of the American College of Radiology (JACR), no further evaluation is recommended     Seen again on CT of chest done 7/19/24 in ED  (Stable 1.7 cm right thyroid nodule. Recommend further evaluation with ultrasound).    US ordered 12/6/24    Reminded patient again today that this needs to be scheduled

## 2025-06-13 ENCOUNTER — HOSPITAL ENCOUNTER (OUTPATIENT)
Dept: PULMONOLOGY | Facility: HOSPITAL | Age: 60
End: 2025-06-13
Attending: NURSE PRACTITIONER
Payer: COMMERCIAL

## 2025-06-13 DIAGNOSIS — R05.2 SUBACUTE COUGH: ICD-10-CM

## 2025-06-13 PROCEDURE — 94726 PLETHYSMOGRAPHY LUNG VOLUMES: CPT | Performed by: INTERNAL MEDICINE

## 2025-06-13 PROCEDURE — 94729 DIFFUSING CAPACITY: CPT | Performed by: INTERNAL MEDICINE

## 2025-06-13 PROCEDURE — 94726 PLETHYSMOGRAPHY LUNG VOLUMES: CPT

## 2025-06-13 PROCEDURE — 94060 EVALUATION OF WHEEZING: CPT | Performed by: INTERNAL MEDICINE

## 2025-06-13 PROCEDURE — 94729 DIFFUSING CAPACITY: CPT

## 2025-06-13 PROCEDURE — 94060 EVALUATION OF WHEEZING: CPT

## 2025-06-13 PROCEDURE — 94760 N-INVAS EAR/PLS OXIMETRY 1: CPT

## 2025-06-13 RX ORDER — ALBUTEROL SULFATE 0.83 MG/ML
2.5 SOLUTION RESPIRATORY (INHALATION) ONCE
Status: COMPLETED | OUTPATIENT
Start: 2025-06-13 | End: 2025-06-13

## 2025-06-13 RX ADMIN — ALBUTEROL SULFATE 2.5 MG: 2.5 SOLUTION RESPIRATORY (INHALATION) at 07:41

## 2025-06-13 NOTE — TELEPHONE ENCOUNTER
PA for Zepbound 2.5mg APPROVED     Date(s) approved June 11, 2025 to June 11, 2026     Case #: [id number]     Patient advised by          []MyChart Message  [x]Phone call- Patient verbalized understanding   []LMOM  []L/M to call office as no active Communication consent on file  []Unable to leave detailed message as VM not approved on Communication consent       Pharmacy advised by    []Fax  [x]Phone call- Paid claim received and med will be sent out to patient  []Secure Chat       Approval letter scanned into Media Yes

## 2025-06-16 NOTE — PROGRESS NOTES
Rheumatology Follow-up Visit  Name: Haroldo Abreu      : 1965      MRN: [id number]  Encounter Provider: Kiera Kimball MD  Encounter Date: 2025   Encounter department: [facility] [address] [place] [address]    Assessment & Plan  Seronegative inflammatory arthritis  59-year-old male who presents for follow-up of seronegative inflammatory arthritis.  So far his symptoms remained well-controlled on methotrexate 20 mg weekly.  He has not had any major flares since methotrexate initiation.  Monitoring labs are stable.  We will plan to continue methotrexate 20 mg weekly with daily folic acid.  Continue lab monitoring every 3 months.  Follow-up in 6 months.       High risk medication use         Long-term use of immunosuppressant medication           History of Present Illness     Haroldo Abreu is a 59 y.o. male who presents for follow-up suspected reactive arthritis.     Interval History:  Patient reports doing about the same as last month.  Reports his joints overall have been stable.  He continues to have some mild pain in the knee when he rises from sitting to standing but no issues while walking.  Few minutes of stiffness in the morning.  No significant swelling.  No issues on methotrexate.    Did notice a little bit of rash on the chest few weeks ago, was out in the sun.  Seem to have resolved.    Recently had URI.  Had persistent cough, getting better but still there.  Did PFTs on Friday.    Permanent History:  Presented 2024 with abrupt onset of severe inflammatory arthritis primarily affecting the knees, ankles, and wrists/hands which was associated with a migratory rash and sore throat.  Prior to the onset of symptoms he had received a vaccination as well as had exposure to COVID.  Extensive workup significant for very elevated inflammatory markers, but otherwise unremarkable. Leading differential initially was for reactive arthritis (HLA-B27 negative). Patient resumed on Mobic at initial  "visit which improved his symptoms. Inflammatory markers improved. Xray of knees with bilateral medial compartment OA.  He had a second recurrence of his initial presenting symptoms in December 2024.  He underwent biopsy of the rash which showed superficial to deep perivascular lymphocytic infiltrate with scattered eosinophils.  He was started on SQ methotrexate.    Complete ROS conducted as per HPI. In addition, denies:  Fever  Photosensitive rash  Sicca symptoms  Recurrent oral ulcers  Muscle weakness  Uveitis  Dactylitis  Chest pain  Pleurisy  Gross hematuria  Foamy urine  Raynaud's  Joint issues other than noted above      Objective     /72   Pulse 78   Temp (!) 95.9 °F (35.5 °C) (Tympanic)   Ht 5' 9\" (1.753 m)   Wt 127 kg (280 lb)   SpO2 99%   BMI 41.35 kg/m²     Physical Exam  Physical Exam  Constitutional: well appearing, no acute distress  HEENT: normocephalic, sclera clear, no visible oral or nasal ulcers  Neck: supple, no palpable cervical adenopathy  CV: regular rate and rhythm, no murmur  Pulm: normal respiratory effort, lungs clear to auscultation b/l  Skin: No appreciable rash  Extremities: warm and well perfused, no edema  MSK: Small cool right knee effusion but otherwise no synovitis    Labs and Imaging  I have personally reviewed pertinent labs and imaging.     "

## 2025-06-17 ENCOUNTER — OFFICE VISIT (OUTPATIENT)
Age: 60
End: 2025-06-17
Payer: COMMERCIAL

## 2025-06-17 VITALS
HEART RATE: 78 BPM | HEIGHT: 69 IN | BODY MASS INDEX: 41.47 KG/M2 | TEMPERATURE: 95.9 F | WEIGHT: 280 LBS | OXYGEN SATURATION: 99 % | DIASTOLIC BLOOD PRESSURE: 72 MMHG | SYSTOLIC BLOOD PRESSURE: 125 MMHG

## 2025-06-17 DIAGNOSIS — Z79.60 LONG-TERM USE OF IMMUNOSUPPRESSANT MEDICATION: ICD-10-CM

## 2025-06-17 DIAGNOSIS — M13.80 SERONEGATIVE INFLAMMATORY ARTHRITIS: Primary | ICD-10-CM

## 2025-06-17 DIAGNOSIS — Z79.899 HIGH RISK MEDICATION USE: ICD-10-CM

## 2025-06-17 PROCEDURE — 99214 OFFICE O/P EST MOD 30 MIN: CPT | Performed by: STUDENT IN AN ORGANIZED HEALTH CARE EDUCATION/TRAINING PROGRAM

## 2025-06-17 NOTE — ASSESSMENT & PLAN NOTE
59-year-old male who presents for follow-up of seronegative inflammatory arthritis.  So far his symptoms remained well-controlled on methotrexate 20 mg weekly.  He has not had any major flares since methotrexate initiation.  Monitoring labs are stable.  We will plan to continue methotrexate 20 mg weekly with daily folic acid.  Continue lab monitoring every 3 months.  Follow-up in 6 months.

## 2025-06-20 ENCOUNTER — HOSPITAL ENCOUNTER (OUTPATIENT)
Dept: ULTRASOUND IMAGING | Facility: HOSPITAL | Age: 60
End: 2025-06-20
Payer: COMMERCIAL

## 2025-06-20 DIAGNOSIS — E04.1 THYROID NODULE: ICD-10-CM

## 2025-06-20 PROCEDURE — 76536 US EXAM OF HEAD AND NECK: CPT

## 2025-06-23 ENCOUNTER — RESULTS FOLLOW-UP (OUTPATIENT)
Age: 60
End: 2025-06-23

## 2025-07-08 ENCOUNTER — APPOINTMENT (OUTPATIENT)
Dept: LAB | Facility: HOSPITAL | Age: 60
End: 2025-07-08
Payer: COMMERCIAL

## 2025-07-08 DIAGNOSIS — R53.83 LOW ENERGY: ICD-10-CM

## 2025-07-08 LAB — TSH SERPL DL<=0.05 MIU/L-ACNC: 2.38 UIU/ML (ref 0.45–4.5)

## 2025-07-10 ENCOUNTER — TELEPHONE (OUTPATIENT)
Dept: FAMILY MEDICINE CLINIC | Facility: CLINIC | Age: 60
End: 2025-07-10

## 2025-07-10 ENCOUNTER — OFFICE VISIT (OUTPATIENT)
Dept: FAMILY MEDICINE CLINIC | Facility: CLINIC | Age: 60
End: 2025-07-10
Payer: COMMERCIAL

## 2025-07-10 VITALS
BODY MASS INDEX: 40.99 KG/M2 | WEIGHT: 277.6 LBS | SYSTOLIC BLOOD PRESSURE: 139 MMHG | DIASTOLIC BLOOD PRESSURE: 65 MMHG | OXYGEN SATURATION: 96 % | HEART RATE: 74 BPM

## 2025-07-10 DIAGNOSIS — S33.5XXA LUMBAR SPRAIN, INITIAL ENCOUNTER: Primary | ICD-10-CM

## 2025-07-10 PROCEDURE — 99213 OFFICE O/P EST LOW 20 MIN: CPT | Performed by: FAMILY MEDICINE

## 2025-07-10 RX ORDER — DICLOFENAC POTASSIUM 50 MG/1
TABLET, FILM COATED ORAL
COMMUNITY
Start: 2025-07-02

## 2025-07-10 RX ORDER — METHOCARBAMOL 500 MG/1
500 TABLET, FILM COATED ORAL 3 TIMES DAILY PRN
Qty: 40 TABLET | Refills: 0 | Status: SHIPPED | OUTPATIENT
Start: 2025-07-10

## 2025-07-10 NOTE — PROGRESS NOTES
Name: Haroldo Abreu      : 1965      MRN: [id number]  Encounter Provider: Sherlyn Victoria DO  Encounter Date: 7/10/2025   Encounter department: [facility]    Assessment & Plan  Lumbar sprain, initial encounter  Moist heat  Gentle stretches  Robaxin 500 mg tid prn  Avoid any lifting > 10 lbs, pushing or pulling  If sx persist, can consider referral to PT  Orders:    methocarbamol (ROBAXIN) 500 mg tablet; Take 1 tablet (500 mg total) by mouth 3 (three) times a day as needed for muscle spasms         History of Present Illness     HPI  Patient is a 59 year old male with hypertension, GERD, seronegative inflammatory arthritis, BPH who is being seen today for complaint of low back pain. Started 3 days ago and is right sided. No radiation of his pain into buttocks or lower extremity.   Pain worsen when moving from sitting to standing.   No injury specifically but had been golfing the day prior to onset of pain.   No previous history of back pain  Taking tylenol and using heat.     Review of Systems  Past Medical History[1]  Past Surgical History[2]  Family History[3]  Social History[4]  Medications[5]  No Known Allergies  Immunization History   Administered Date(s) Administered    COVID-19 PFIZER VACCINE 0.3 ML IM 2021, 2021, 2021    INFLUENZA 10/20/2022    Influenza Quadrivalent Recombinant Preservative Free IM 10/28/2021    Tdap 2022    Zoster Vaccine Recombinant 2024     Objective   /65   Pulse 74   Wt 126 kg (277 lb 9.6 oz)   SpO2 96%   BMI 40.99 kg/m²     Physical Exam  Vitals and nursing note reviewed.   Constitutional:       General: He is not in acute distress.     Appearance: Normal appearance. He is obese. He is not ill-appearing, toxic-appearing or diaphoretic.     Cardiovascular:      Rate and Rhythm: Normal rate and regular rhythm.      Heart sounds: No murmur heard.  Pulmonary:      Effort: Pulmonary effort is normal.       Breath sounds: Normal breath sounds.     Musculoskeletal:      Cervical back: Normal range of motion and neck supple.      Right lower leg: No edema.      Left lower leg: No edema.      Comments: Lumbar spine with full flexion. Decreased extension  Tenderness on palpation right paravertebral muscles L5-S1 region  Negative SLR   Lymphadenopathy:      Cervical: No cervical adenopathy.     Skin:     Findings: No rash.     Neurological:      General: No focal deficit present.      Mental Status: He is alert and oriented to person, place, and time.      Deep Tendon Reflexes: Reflexes normal.              [1]   Past Medical History:  Diagnosis Date    Basal cell carcinoma 2019    Cancer (HCC) 2018    Colon polyp     COVID-19 12/2020    Eczema 2015    GERD (gastroesophageal reflux disease)     HL (hearing loss) 1974    Tumor    Melanoma ([facility]) 2017    Dermatology and Moh's surgery in [place]. Afua; Right lateral upper back    Pneumonia 2/21/2023    I have had a cough since early Deecember    Testicular cancer ([facility]) 2000    Dr monterroso and yony (urology)    [facility] 2017   [2]   Past Surgical History:  Procedure Laterality Date    COLONOSCOPY      January 2021 small cecal serrated adenoma, sigmoid diverticulosis, internal hemorrhoids    ORCHIECTOMY Right     SKIN BIOPSY  2017,2018,2020    SKIN CANCER EXCISION      TENDON REPAIR  2010    Tore Achilles tendon    TUMOR EXCISION      right ear as a child    UPPER GASTROINTESTINAL ENDOSCOPY      January 2021: Schatzki ring biopsies negative for Johnson's or EOE, fundic gland polyps, mild gastritis negative for H pylori.   September 2014 Schatzki ring biopsies negative for Johnson's EOE or H pylori.    VASECTOMY     [3]   Family History  Problem Relation Name Age of Onset    Diabetes Mother MRSA     Hypertension Mother MRSA     Hypertension Father Maciej he     Heart disease Father Maciej he     Heart failure Father Maciej he         Congestive    No Known  "Problems Sister      No Known Problems Brother      No Known Problems Brother      Colon polyps Neg Hx      Colon cancer Neg Hx     [4]   Social History  Tobacco Use    Smoking status: Never     Passive exposure: Never    Smokeless tobacco: Never   Vaping Use    Vaping status: Never Used   Substance and Sexual Activity    Alcohol use: Yes     Alcohol/week: 1.0 standard drink of alcohol     Types: 1 Cans of beer per week     Comment: socially    Drug use: Never    Sexual activity: Yes     Partners: Female     Birth control/protection: None   [5]   Current Outpatient Medications on File Prior to Visit   Medication Sig    alfuzosin (UROXATRAL) 10 mg 24 hr tablet Take 1 tablet (10 mg total) by mouth daily    B-D INS SYR MICROFINE 1CC/28G 28G X 1/2\" 1 ML MISC     folic acid ( Folic Acid) 1 mg tablet Take 1 tablet (1 mg total) by mouth daily    ketoconazole (NIZORAL) 2 % cream Apply 2x/day to bottom of feet and in between toes for 3 weeks. Apply on facial redness/rash as needed.    lisinopril (ZESTRIL) 20 mg tablet Take 1 tablet (20 mg total) by mouth daily    methotrexate 50 MG/2ML injection Inject 0.8 mL (20 mg total) under the skin every 7 days    Multiple Vitamins-Minerals (MULTIVITAMIN ADULTS PO) Take 1 capsule by mouth in the morning.    RABEprazole (ACIPHEX) 20 MG tablet Take 1 tablet (20 mg total) by mouth daily    tirzepatide (Zepbound) 2.5 mg/0.5 mL auto-injector Inject 0.5 mL (2.5 mg total) under the skin once a week for 28 days    Tuberculin-Allergy Syringes (B-D ALLERGY SYRINGE 1CC/28G) 28G X 1/2\" 1 ML MISC Use every 7 days Use to admin SQ MTX weekly    [DISCONTINUED] triamcinolone (KENALOG) 0.025 % cream Apply topically 2 (two) times a day Apply twice daily for up to 2 weeks. Can repeat for flare ups (Patient not taking: Reported on 7/10/2025)     "

## 2025-07-21 DIAGNOSIS — E66.01 MORBID OBESITY WITH BMI OF 40.0-44.9, ADULT (HCC): Primary | ICD-10-CM

## 2025-07-21 RX ORDER — TIRZEPATIDE 5 MG/.5ML
5 INJECTION, SOLUTION SUBCUTANEOUS WEEKLY
Qty: 2 ML | Refills: 0 | Status: SHIPPED | OUTPATIENT
Start: 2025-07-21

## 2025-07-28 ENCOUNTER — OFFICE VISIT (OUTPATIENT)
Dept: FAMILY MEDICINE CLINIC | Facility: CLINIC | Age: 60
End: 2025-07-28
Payer: COMMERCIAL

## 2025-07-28 VITALS
SYSTOLIC BLOOD PRESSURE: 126 MMHG | HEART RATE: 69 BPM | HEIGHT: 69 IN | OXYGEN SATURATION: 98 % | DIASTOLIC BLOOD PRESSURE: 66 MMHG | BODY MASS INDEX: 40.64 KG/M2 | WEIGHT: 274.4 LBS | TEMPERATURE: 97.6 F

## 2025-07-28 DIAGNOSIS — Z79.899 MEDICATION MANAGEMENT: Primary | ICD-10-CM

## 2025-07-28 DIAGNOSIS — E66.01 MORBID OBESITY WITH BMI OF 40.0-44.9, ADULT (HCC): ICD-10-CM

## 2025-07-28 PROCEDURE — 99213 OFFICE O/P EST LOW 20 MIN: CPT | Performed by: FAMILY MEDICINE

## 2025-08-01 ENCOUNTER — OFFICE VISIT (OUTPATIENT)
Dept: CARDIOLOGY CLINIC | Facility: CLINIC | Age: 60
End: 2025-08-01
Attending: FAMILY MEDICINE
Payer: COMMERCIAL

## 2025-08-01 VITALS
HEART RATE: 70 BPM | BODY MASS INDEX: 40.43 KG/M2 | DIASTOLIC BLOOD PRESSURE: 84 MMHG | HEIGHT: 69 IN | OXYGEN SATURATION: 97 % | WEIGHT: 273 LBS | SYSTOLIC BLOOD PRESSURE: 120 MMHG

## 2025-08-01 DIAGNOSIS — I25.10 CORONARY ARTERY CALCIFICATION SEEN ON CT SCAN: ICD-10-CM

## 2025-08-01 DIAGNOSIS — I10 PRIMARY HYPERTENSION: ICD-10-CM

## 2025-08-01 DIAGNOSIS — E66.813 CLASS 3 SEVERE OBESITY WITHOUT SERIOUS COMORBIDITY WITH BODY MASS INDEX (BMI) OF 40.0 TO 44.9 IN ADULT, UNSPECIFIED OBESITY TYPE: ICD-10-CM

## 2025-08-01 DIAGNOSIS — E78.2 MIXED HYPERLIPIDEMIA: ICD-10-CM

## 2025-08-01 DIAGNOSIS — I77.810 DILATED AORTIC ROOT (HCC): ICD-10-CM

## 2025-08-01 DIAGNOSIS — I25.10 CORONARY ARTERY CALCIFICATION: ICD-10-CM

## 2025-08-01 DIAGNOSIS — Z82.49 FAMILY HISTORY OF CARDIOMYOPATHY: ICD-10-CM

## 2025-08-01 DIAGNOSIS — R53.83 LOW ENERGY: ICD-10-CM

## 2025-08-01 DIAGNOSIS — R06.09 DOE (DYSPNEA ON EXERTION): Primary | ICD-10-CM

## 2025-08-01 PROCEDURE — 99244 OFF/OP CNSLTJ NEW/EST MOD 40: CPT | Performed by: INTERNAL MEDICINE

## 2025-08-01 PROCEDURE — 93000 ELECTROCARDIOGRAM COMPLETE: CPT | Performed by: INTERNAL MEDICINE

## 2025-08-01 RX ORDER — ASPIRIN 81 MG/1
81 TABLET ORAL EVERY OTHER DAY
Start: 2025-08-01

## 2025-08-01 RX ORDER — ATORVASTATIN CALCIUM 20 MG/1
20 TABLET, FILM COATED ORAL
Qty: 90 TABLET | Refills: 3 | Status: SHIPPED | OUTPATIENT
Start: 2025-08-01

## 2025-08-07 ENCOUNTER — HOSPITAL ENCOUNTER (OUTPATIENT)
Dept: NON INVASIVE DIAGNOSTICS | Facility: CLINIC | Age: 60
Discharge: HOME | End: 2025-08-07
Attending: INTERNAL MEDICINE
Payer: COMMERCIAL

## 2025-08-07 VITALS — HEIGHT: 69 IN | BODY MASS INDEX: 40.43 KG/M2 | WEIGHT: 273 LBS

## 2025-08-07 DIAGNOSIS — I25.10 CORONARY ARTERY CALCIFICATION SEEN ON CT SCAN: ICD-10-CM

## 2025-08-07 DIAGNOSIS — I25.10 CORONARY ARTERY CALCIFICATION: ICD-10-CM

## 2025-08-07 DIAGNOSIS — R06.09 DOE (DYSPNEA ON EXERTION): ICD-10-CM

## 2025-08-07 LAB
CHEST PAIN STATEMENT: NORMAL
MAX DIASTOLIC BP: 80 MMHG
MAX HR PERCENT: 93 %
MAX HR: 151 BPM
MAX PREDICTED HEART RATE: 161 BPM
PROTOCOL NAME: NORMAL
RATE PRESSURE PRODUCT: NORMAL
REASON FOR TERMINATION: NORMAL
SL CV REST NUCLEAR ISOTOPE DOSE: 15.9 MCI
SL CV STRESS NUCLEAR ISOTOPE DOSE: 47.3 MCI
SL CV STRESS RECOVERY BP: NORMAL MMHG
SL CV STRESS RECOVERY HR: 102 BPM
SL CV STRESS RECOVERY O2 SAT: 99 %
SL CV STRESS STAGE REACHED: 2
SPECT HRT GATED+EF W RNC IV: 68 %
STRESS ANGINA INDEX: 0
STRESS BASELINE BP: NORMAL MMHG
STRESS BASELINE HR: 71 BPM
STRESS O2 SAT REST: 100 %
STRESS PEAK HR: 146 BPM
STRESS POST ESTIMATED WORKLOAD: 7 METS
STRESS POST EXERCISE DUR MIN: 5 MIN
STRESS POST EXERCISE DUR MIN: 5 MIN
STRESS POST EXERCISE DUR SEC: 0 SEC
STRESS POST EXERCISE DUR SEC: 0 SEC
STRESS POST O2 SAT PEAK: 99 %
STRESS POST PEAK BP: 172 MMHG
STRESS POST PEAK HR: 151 BPM
STRESS POST PEAK SYSTOLIC BP: 178 MMHG
STRESS/REST PERFUSION RATIO: 0.94
TARGET HR FORMULA: NORMAL
TEST INDICATION: NORMAL

## 2025-08-07 PROCEDURE — A9502 TC99M TETROFOSMIN: HCPCS

## 2025-08-07 PROCEDURE — 93018 CV STRESS TEST I&R ONLY: CPT | Performed by: INTERNAL MEDICINE

## 2025-08-07 PROCEDURE — 93017 CV STRESS TEST TRACING ONLY: CPT

## 2025-08-07 PROCEDURE — 78452 HT MUSCLE IMAGE SPECT MULT: CPT

## 2025-08-07 PROCEDURE — 78452 HT MUSCLE IMAGE SPECT MULT: CPT | Performed by: INTERNAL MEDICINE

## 2025-08-07 PROCEDURE — 93016 CV STRESS TEST SUPVJ ONLY: CPT | Performed by: INTERNAL MEDICINE

## 2025-08-08 ENCOUNTER — APPOINTMENT (OUTPATIENT)
Dept: LAB | Facility: HOSPITAL | Age: 60
End: 2025-08-08
Payer: COMMERCIAL

## 2025-08-18 ENCOUNTER — PATIENT MESSAGE (OUTPATIENT)
Dept: FAMILY MEDICINE CLINIC | Facility: CLINIC | Age: 60
End: 2025-08-18

## 2025-08-18 DIAGNOSIS — E66.01 MORBID OBESITY WITH BMI OF 40.0-44.9, ADULT (HCC): Primary | ICD-10-CM

## 2025-08-19 RX ORDER — TIRZEPATIDE 7.5 MG/.5ML
7.5 INJECTION, SOLUTION SUBCUTANEOUS WEEKLY
Qty: 2 ML | Refills: 0 | Status: SHIPPED | OUTPATIENT
Start: 2025-08-19 | End: 2025-08-20 | Stop reason: SDUPTHER

## 2025-08-20 DIAGNOSIS — E66.01 MORBID OBESITY WITH BMI OF 40.0-44.9, ADULT (HCC): ICD-10-CM

## 2025-08-20 RX ORDER — TIRZEPATIDE 7.5 MG/.5ML
7.5 INJECTION, SOLUTION SUBCUTANEOUS WEEKLY
Qty: 2 ML | Refills: 0 | Status: SHIPPED | OUTPATIENT
Start: 2025-08-20